# Patient Record
Sex: MALE | Race: AMERICAN INDIAN OR ALASKA NATIVE | NOT HISPANIC OR LATINO | Employment: OTHER | URBAN - METROPOLITAN AREA
[De-identification: names, ages, dates, MRNs, and addresses within clinical notes are randomized per-mention and may not be internally consistent; named-entity substitution may affect disease eponyms.]

---

## 2018-06-13 ENCOUNTER — HOSPITAL ENCOUNTER (OUTPATIENT)
Facility: MEDICAL CENTER | Age: 68
DRG: 330 | End: 2018-06-13
Payer: MEDICARE

## 2018-06-14 ENCOUNTER — HOSPITAL ENCOUNTER (INPATIENT)
Facility: MEDICAL CENTER | Age: 68
LOS: 7 days | DRG: 330 | End: 2018-06-21
Attending: HOSPITALIST | Admitting: HOSPITALIST
Payer: MEDICARE

## 2018-06-14 DIAGNOSIS — K63.2 ABDOMINAL WALL FISTULA: ICD-10-CM

## 2018-06-14 DIAGNOSIS — K57.92 DIVERTICULITIS: ICD-10-CM

## 2018-06-14 DIAGNOSIS — E66.9 OBESITY (BMI 35.0-39.9 WITHOUT COMORBIDITY): ICD-10-CM

## 2018-06-14 DIAGNOSIS — K65.1 ABSCESS OF ABDOMINAL CAVITY (HCC): ICD-10-CM

## 2018-06-14 PROBLEM — C64.9 RENAL CELL ADENOCARCINOMA (HCC): Status: ACTIVE | Noted: 2018-06-14

## 2018-06-14 PROBLEM — I10 HTN (HYPERTENSION): Status: ACTIVE | Noted: 2018-06-14

## 2018-06-14 PROBLEM — Z90.5 H/O UNILATERAL NEPHRECTOMY: Status: ACTIVE | Noted: 2018-06-14

## 2018-06-14 LAB
ALBUMIN SERPL BCP-MCNC: 3.5 G/DL (ref 3.2–4.9)
ALBUMIN/GLOB SERPL: 0.7 G/DL
ALP SERPL-CCNC: 58 U/L (ref 30–99)
ALT SERPL-CCNC: 13 U/L (ref 2–50)
ANION GAP SERPL CALC-SCNC: 8 MMOL/L (ref 0–11.9)
APTT PPP: 30.3 SEC (ref 24.7–36)
AST SERPL-CCNC: 20 U/L (ref 12–45)
BASOPHILS # BLD AUTO: 0.3 % (ref 0–1.8)
BASOPHILS # BLD: 0.02 K/UL (ref 0–0.12)
BILIRUB SERPL-MCNC: 1.7 MG/DL (ref 0.1–1.5)
BUN SERPL-MCNC: 19 MG/DL (ref 8–22)
CALCIUM SERPL-MCNC: 8.9 MG/DL (ref 8.5–10.5)
CHLORIDE SERPL-SCNC: 106 MMOL/L (ref 96–112)
CO2 SERPL-SCNC: 24 MMOL/L (ref 20–33)
CREAT SERPL-MCNC: 0.99 MG/DL (ref 0.5–1.4)
EOSINOPHIL # BLD AUTO: 0.15 K/UL (ref 0–0.51)
EOSINOPHIL NFR BLD: 2.3 % (ref 0–6.9)
ERYTHROCYTE [DISTWIDTH] IN BLOOD BY AUTOMATED COUNT: 44.6 FL (ref 35.9–50)
EST. AVERAGE GLUCOSE BLD GHB EST-MCNC: 131 MG/DL
GLOBULIN SER CALC-MCNC: 4.8 G/DL (ref 1.9–3.5)
GLUCOSE SERPL-MCNC: 106 MG/DL (ref 65–99)
HBA1C MFR BLD: 6.2 % (ref 0–5.6)
HCT VFR BLD AUTO: 38.3 % (ref 42–52)
HGB BLD-MCNC: 12.4 G/DL (ref 14–18)
IMM GRANULOCYTES # BLD AUTO: 0.02 K/UL (ref 0–0.11)
IMM GRANULOCYTES NFR BLD AUTO: 0.3 % (ref 0–0.9)
INR PPP: 1.12 (ref 0.87–1.13)
LYMPHOCYTES # BLD AUTO: 0.95 K/UL (ref 1–4.8)
LYMPHOCYTES NFR BLD: 14.7 % (ref 22–41)
MAGNESIUM SERPL-MCNC: 2.4 MG/DL (ref 1.5–2.5)
MCH RBC QN AUTO: 27.2 PG (ref 27–33)
MCHC RBC AUTO-ENTMCNC: 32.4 G/DL (ref 33.7–35.3)
MCV RBC AUTO: 84 FL (ref 81.4–97.8)
MONOCYTES # BLD AUTO: 0.49 K/UL (ref 0–0.85)
MONOCYTES NFR BLD AUTO: 7.6 % (ref 0–13.4)
NEUTROPHILS # BLD AUTO: 4.82 K/UL (ref 1.82–7.42)
NEUTROPHILS NFR BLD: 74.8 % (ref 44–72)
NRBC # BLD AUTO: 0 K/UL
NRBC BLD-RTO: 0 /100 WBC
PHOSPHATE SERPL-MCNC: 3.9 MG/DL (ref 2.5–4.5)
PLATELET # BLD AUTO: 286 K/UL (ref 164–446)
PMV BLD AUTO: 8.8 FL (ref 9–12.9)
POTASSIUM SERPL-SCNC: 4.1 MMOL/L (ref 3.6–5.5)
PROT SERPL-MCNC: 8.3 G/DL (ref 6–8.2)
PROTHROMBIN TIME: 14.1 SEC (ref 12–14.6)
RBC # BLD AUTO: 4.56 M/UL (ref 4.7–6.1)
SODIUM SERPL-SCNC: 138 MMOL/L (ref 135–145)
WBC # BLD AUTO: 6.5 K/UL (ref 4.8–10.8)

## 2018-06-14 PROCEDURE — 83735 ASSAY OF MAGNESIUM: CPT

## 2018-06-14 PROCEDURE — 83036 HEMOGLOBIN GLYCOSYLATED A1C: CPT

## 2018-06-14 PROCEDURE — 700105 HCHG RX REV CODE 258

## 2018-06-14 PROCEDURE — 700111 HCHG RX REV CODE 636 W/ 250 OVERRIDE (IP): Performed by: HOSPITALIST

## 2018-06-14 PROCEDURE — 770006 HCHG ROOM/CARE - MED/SURG/GYN SEMI*

## 2018-06-14 PROCEDURE — 700105 HCHG RX REV CODE 258: Performed by: HOSPITALIST

## 2018-06-14 PROCEDURE — 700102 HCHG RX REV CODE 250 W/ 637 OVERRIDE(OP): Performed by: SURGERY

## 2018-06-14 PROCEDURE — A9270 NON-COVERED ITEM OR SERVICE: HCPCS | Performed by: HOSPITALIST

## 2018-06-14 PROCEDURE — 700101 HCHG RX REV CODE 250: Performed by: SURGERY

## 2018-06-14 PROCEDURE — 99223 1ST HOSP IP/OBS HIGH 75: CPT | Mod: AI | Performed by: HOSPITALIST

## 2018-06-14 PROCEDURE — A9270 NON-COVERED ITEM OR SERVICE: HCPCS | Performed by: SURGERY

## 2018-06-14 PROCEDURE — 36415 COLL VENOUS BLD VENIPUNCTURE: CPT

## 2018-06-14 PROCEDURE — 84100 ASSAY OF PHOSPHORUS: CPT

## 2018-06-14 PROCEDURE — 85730 THROMBOPLASTIN TIME PARTIAL: CPT

## 2018-06-14 PROCEDURE — 85610 PROTHROMBIN TIME: CPT

## 2018-06-14 PROCEDURE — 99233 SBSQ HOSP IP/OBS HIGH 50: CPT | Performed by: HOSPITALIST

## 2018-06-14 PROCEDURE — 80053 COMPREHEN METABOLIC PANEL: CPT

## 2018-06-14 PROCEDURE — 85025 COMPLETE CBC W/AUTO DIFF WBC: CPT

## 2018-06-14 PROCEDURE — 700102 HCHG RX REV CODE 250 W/ 637 OVERRIDE(OP): Performed by: HOSPITALIST

## 2018-06-14 RX ORDER — AMOXICILLIN 250 MG
2 CAPSULE ORAL 2 TIMES DAILY
Status: DISCONTINUED | OUTPATIENT
Start: 2018-06-14 | End: 2018-06-21 | Stop reason: HOSPADM

## 2018-06-14 RX ORDER — ONDANSETRON 2 MG/ML
4 INJECTION INTRAMUSCULAR; INTRAVENOUS EVERY 4 HOURS PRN
Status: DISCONTINUED | OUTPATIENT
Start: 2018-06-14 | End: 2018-06-15

## 2018-06-14 RX ORDER — SODIUM CHLORIDE 9 MG/ML
INJECTION, SOLUTION INTRAVENOUS CONTINUOUS
Status: DISCONTINUED | OUTPATIENT
Start: 2018-06-14 | End: 2018-06-15

## 2018-06-14 RX ORDER — BISACODYL 10 MG
10 SUPPOSITORY, RECTAL RECTAL
Status: DISCONTINUED | OUTPATIENT
Start: 2018-06-14 | End: 2018-06-21 | Stop reason: HOSPADM

## 2018-06-14 RX ORDER — ACETAMINOPHEN 325 MG/1
650 TABLET ORAL EVERY 6 HOURS PRN
Status: DISCONTINUED | OUTPATIENT
Start: 2018-06-14 | End: 2018-06-21 | Stop reason: HOSPADM

## 2018-06-14 RX ORDER — MORPHINE SULFATE 10 MG/ML
5 INJECTION, SOLUTION INTRAMUSCULAR; INTRAVENOUS
Status: DISCONTINUED | OUTPATIENT
Start: 2018-06-14 | End: 2018-06-19

## 2018-06-14 RX ORDER — ONDANSETRON 4 MG/1
4 TABLET, ORALLY DISINTEGRATING ORAL EVERY 4 HOURS PRN
Status: DISCONTINUED | OUTPATIENT
Start: 2018-06-14 | End: 2018-06-15

## 2018-06-14 RX ORDER — SODIUM CHLORIDE 9 MG/ML
INJECTION, SOLUTION INTRAVENOUS
Status: COMPLETED
Start: 2018-06-14 | End: 2018-06-14

## 2018-06-14 RX ORDER — MORPHINE SULFATE 4 MG/ML
1-4 INJECTION, SOLUTION INTRAMUSCULAR; INTRAVENOUS
Status: DISCONTINUED | OUTPATIENT
Start: 2018-06-14 | End: 2018-06-19

## 2018-06-14 RX ORDER — MORPHINE SULFATE 4 MG/ML
1-5 INJECTION, SOLUTION INTRAMUSCULAR; INTRAVENOUS
Status: DISCONTINUED | OUTPATIENT
Start: 2018-06-14 | End: 2018-06-14

## 2018-06-14 RX ORDER — POLYETHYLENE GLYCOL 3350 17 G/17G
1 POWDER, FOR SOLUTION ORAL
Status: DISCONTINUED | OUTPATIENT
Start: 2018-06-14 | End: 2018-06-21 | Stop reason: HOSPADM

## 2018-06-14 RX ORDER — HALOPERIDOL 5 MG/ML
5 INJECTION INTRAMUSCULAR EVERY 4 HOURS PRN
Status: DISCONTINUED | OUTPATIENT
Start: 2018-06-14 | End: 2018-06-21 | Stop reason: HOSPADM

## 2018-06-14 RX ORDER — METRONIDAZOLE 500 MG/1
500 TABLET ORAL 3 TIMES DAILY
Status: COMPLETED | OUTPATIENT
Start: 2018-06-14 | End: 2018-06-15

## 2018-06-14 RX ORDER — NEOMYCIN SULFATE 500 MG/1
1000 TABLET ORAL 3 TIMES DAILY
Status: DISCONTINUED | OUTPATIENT
Start: 2018-06-14 | End: 2018-06-15

## 2018-06-14 RX ADMIN — PIPERACILLIN SODIUM AND TAZOBACTAM SODIUM 3.38 G: 3; .375 INJECTION, POWDER, FOR SOLUTION INTRAVENOUS at 21:07

## 2018-06-14 RX ADMIN — NEOMYCIN SULFATE 1000 MG: 500 TABLET ORAL at 16:21

## 2018-06-14 RX ADMIN — PIPERACILLIN SODIUM AND TAZOBACTAM SODIUM 3.38 G: 3; .375 INJECTION, POWDER, FOR SOLUTION INTRAVENOUS at 05:15

## 2018-06-14 RX ADMIN — PIPERACILLIN SODIUM AND TAZOBACTAM SODIUM 3.38 G: 3; .375 INJECTION, POWDER, FOR SOLUTION INTRAVENOUS at 12:56

## 2018-06-14 RX ADMIN — NEOMYCIN SULFATE 1000 MG: 500 TABLET ORAL at 22:11

## 2018-06-14 RX ADMIN — SODIUM CHLORIDE: 9 INJECTION, SOLUTION INTRAVENOUS at 09:18

## 2018-06-14 RX ADMIN — PIPERACILLIN SODIUM AND TAZOBACTAM SODIUM 3.38 G: 3; .375 INJECTION, POWDER, FOR SOLUTION INTRAVENOUS at 02:12

## 2018-06-14 RX ADMIN — SODIUM CHLORIDE: 9 INJECTION, SOLUTION INTRAVENOUS at 02:13

## 2018-06-14 RX ADMIN — METRONIDAZOLE 500 MG: 500 TABLET ORAL at 15:12

## 2018-06-14 RX ADMIN — NEOMYCIN SULFATE 1000 MG: 500 TABLET ORAL at 16:20

## 2018-06-14 RX ADMIN — POLYETHYLENE GLYCOL 3350, SODIUM SULFATE ANHYDROUS, SODIUM BICARBONATE, SODIUM CHLORIDE, POTASSIUM CHLORIDE 4 L: 236; 22.74; 6.74; 5.86; 2.97 POWDER, FOR SOLUTION ORAL at 15:37

## 2018-06-14 RX ADMIN — ACETAMINOPHEN 650 MG: 325 TABLET, FILM COATED ORAL at 15:42

## 2018-06-14 RX ADMIN — METRONIDAZOLE 500 MG: 500 TABLET ORAL at 22:11

## 2018-06-14 RX ADMIN — SODIUM CHLORIDE 1000 ML: 9 INJECTION, SOLUTION INTRAVENOUS at 02:11

## 2018-06-14 ASSESSMENT — ENCOUNTER SYMPTOMS
NECK PAIN: 0
GASTROINTESTINAL NEGATIVE: 1
RESPIRATORY NEGATIVE: 1
MUSCULOSKELETAL NEGATIVE: 1
TINGLING: 0
INSOMNIA: 0
ABDOMINAL PAIN: 0
BLURRED VISION: 0
EYES NEGATIVE: 1
VOMITING: 0
DIZZINESS: 0
EYE PAIN: 0
FEVER: 0
BACK PAIN: 0
SHORTNESS OF BREATH: 0
NEUROLOGICAL NEGATIVE: 1
PALPITATIONS: 0
CARDIOVASCULAR NEGATIVE: 1
CHILLS: 1
SORE THROAT: 0
CHILLS: 0
DEPRESSION: 0
NAUSEA: 0
COUGH: 0
PSYCHIATRIC NEGATIVE: 1
HEADACHES: 0

## 2018-06-14 ASSESSMENT — COPD QUESTIONNAIRES
IN THE PAST 12 MONTHS DO YOU DO LESS THAN YOU USED TO BECAUSE OF YOUR BREATHING PROBLEMS: DISAGREE/UNSURE
COPD SCREENING SCORE: 2
HAVE YOU SMOKED AT LEAST 100 CIGARETTES IN YOUR ENTIRE LIFE: NO/DON'T KNOW
COPD SCREENING SCORE: 2
DURING THE PAST 4 WEEKS HOW MUCH DID YOU FEEL SHORT OF BREATH: NONE/LITTLE OF THE TIME
HAVE YOU SMOKED AT LEAST 100 CIGARETTES IN YOUR ENTIRE LIFE: NO/DON'T KNOW
IN THE PAST 12 MONTHS DO YOU DO LESS THAN YOU USED TO BECAUSE OF YOUR BREATHING PROBLEMS: DISAGREE/UNSURE
DURING THE PAST 4 WEEKS HOW MUCH DID YOU FEEL SHORT OF BREATH: NONE/LITTLE OF THE TIME
DO YOU EVER COUGH UP ANY MUCUS OR PHLEGM?: NO/ONLY WITH OCCASIONAL COLDS OR INFECTIONS
DO YOU EVER COUGH UP ANY MUCUS OR PHLEGM?: NO/ONLY WITH OCCASIONAL COLDS OR INFECTIONS

## 2018-06-14 ASSESSMENT — LIFESTYLE VARIABLES
ALCOHOL_USE: NO
ALCOHOL_USE: NO
EVER_SMOKED: NEVER

## 2018-06-14 ASSESSMENT — PATIENT HEALTH QUESTIONNAIRE - PHQ9
SUM OF ALL RESPONSES TO PHQ9 QUESTIONS 1 AND 2: 0
1. LITTLE INTEREST OR PLEASURE IN DOING THINGS: NOT AT ALL
2. FEELING DOWN, DEPRESSED, IRRITABLE, OR HOPELESS: NOT AT ALL

## 2018-06-14 ASSESSMENT — PAIN SCALES - GENERAL
PAINLEVEL_OUTOF10: 2
PAINLEVEL_OUTOF10: 2

## 2018-06-14 NOTE — ASSESSMENT & PLAN NOTE
Recurrent.  On antibiotic therapy for the same.  Here with abscess and fistula formation  Iv abx, s/p surgery intervention.  Will need wound care post discharge-ostomy care and monitoring-setting up through [facility]

## 2018-06-14 NOTE — H&P
Hospital Medicine History and Physical    Date of Service  6/14/2018    Chief Complaint  Abdominal drainage    History of Presenting Illness  67 y.o. male with history of diverticulitis with abscess formation and chronic healing process, prior renal cell carcinoma with nephrectomy and stable was in his usual state of health until the day prior to admission.  He had felt a pop in his abdomen at the prior surgical site, and then began to notice drainage from the same, which appeared to be pus, and had a foul smell.  He had no pain, nausea or vomiting.  He had no other symptoms except possible chills.  Due to the drainage he felt he needed further medical attention and went to the hospital.  He was transferred to this facility for higher level of care and need surgical intervention.    Primary Care Physician  No primary care provider on file.    Consultants  General Surgery Dr. Teran     Code Status  Full code     Review of Systems  Review of Systems   Constitutional: Positive for chills.   HENT: Negative.    Eyes: Negative.    Respiratory: Negative.    Cardiovascular: Negative.    Gastrointestinal: Negative.    Genitourinary: Negative.    Musculoskeletal: Negative.    Skin: Negative.    Neurological: Negative.    Endo/Heme/Allergies: Negative.    Psychiatric/Behavioral: Negative.         Past Medical History  Past Medical History:   Diagnosis Date   • Cancer (HCC)    • Fall    • Hypertension    • Renal disorder      rt kidney removed dec 31, 2001       Surgical History  Past Surgical History:   Procedure Laterality Date   • OTHER ABDOMINAL SURGERY      bowel resection august 2016       Medications  No current facility-administered medications on file prior to encounter.      No current outpatient prescriptions on file prior to encounter.       Family History  Family History   Problem Relation Age of Onset   • No Known Problems Mother    • Alcohol/Drug Father        Social History  Social History   Substance Use  Topics   • Smoking status: Never Smoker   • Smokeless tobacco: Never Used   • Alcohol use No       Allergies  No Known Allergies     Physical Exam  Laboratory   Hemodynamics  Temp (24hrs), Av.2 °C (98.9 °F), Min:37.2 °C (98.9 °F), Max:37.2 °C (98.9 °F)   Temperature: 37.2 °C (98.9 °F)  Pulse  Av  Min: 86  Max: 86    Blood Pressure : 131/89      Respiratory      Respiration: 18, Pulse Oximetry: 93 %             Physical Exam   Constitutional: He is oriented to person, place, and time. He appears well-developed and well-nourished. No distress.   HENT:   Head: Normocephalic.   Eyes: Pupils are equal, round, and reactive to light.   Neck: Normal range of motion. Neck supple.   Cardiovascular: Normal rate, regular rhythm and normal heart sounds.  Exam reveals no gallop and no friction rub.    No murmur heard.  Pulmonary/Chest: Effort normal and breath sounds normal. No respiratory distress. He has no wheezes.   Abdominal: Soft. Bowel sounds are normal. He exhibits no distension and no mass. There is no tenderness. There is no rebound and no guarding.   Left lower quadrant surgical changes, drainage noted with bandage in place    Musculoskeletal: Normal range of motion. He exhibits no edema.   Neurological: He is alert and oriented to person, place, and time. No cranial nerve deficit.   Skin: He is not diaphoretic.   Nursing note and vitals reviewed.        From outside facility:    WBC 6.1  H/H 13.6/40.3      Glu 101  BUn 16  Crea 1.22  Na 138  K 4.0  Cl 105  CO2 24  Ca 9.4  T Bili 1.2  Lipase 224  AST 21  ALT 20            Imaging  CT abdomen and pelvis with contrast from outside facility with with persistent wall thickening with diverticula along the sigmoid colon adjacent extesnive soft tissue thickening, stranding and a few bubbles of gas extending along the thickening into abdominal wall musculature on the left side is also some soft tissue thickening along subcutaneous tissues extending out to the  skin.  Prior nephrectomy    Assessment/Plan     I anticipate this patient will require at least two midnights for appropriate medical management, necessitating inpatient admission.    * Abdominal wall fistula   Assessment & Plan    Transferred from outside facility for the same.  Plan for operative intervention - Dr. Teran.  Continue antibiotics with zosyn for now.  NPO        Diverticulitis   Assessment & Plan    Recurrent.  On antibiotic therapy for the same.  Now with abscess and fistula formation        Obesity (BMI 35.0-39.9 without comorbidity)   Assessment & Plan    Body mass index is 38.19 kg/m².              VTE prophylaxis: SCD hold lovenox for surgery.

## 2018-06-14 NOTE — PROGRESS NOTES
Pt A&O x's 4, VSS, denies any pain at this time. Pt has +void, +loose BM, +flatus. LLQ wound with moderate output, frequent dressing changes performed, wound care consulted, refer to notes.IVF's and abx running at bedside. POC discussed with pt, all questions and concerns have been addressed at this time. Bed in locked/lowest position, call light, urinal and personal items within reach. Will continue to monitor.

## 2018-06-14 NOTE — CARE PLAN
Problem: Safety  Goal: Will remain free from falls  Outcome: PROGRESSING AS EXPECTED  Pt remains free from fall at this time. Pt educated on fall risk, demonstrates understanding of appropriate use of call light, hourly rounding in place.

## 2018-06-14 NOTE — WOUND TEAM
Wound consult for evaluation of possible draining fistula LLQ.  Note a red linear wound that is draining apparent stool that is in a deep crease.  Due to location unable to apply a pouch to contain drainage.  Cleansed anna wound skin and applied skin prep and replaced dry gauze and ABD secured with brief.  Dr. Willams consulting and probable surgery.  Discussed with staff RN.

## 2018-06-14 NOTE — PROGRESS NOTES
Direct admit from [facility], Dr. Sarah, [phone].  Accepted by Dr. Steele for Diverticulitis complicated with fistula.  ADT signed & held @ [phone], needs to be released upon pt arrival.  No written orders received.  Pt coming by ground EMS.

## 2018-06-14 NOTE — PROGRESS NOTES
[facility]    Date of Service: 2018    Chief Complaint  67 y.o. male admitted 2018 with abdominal wound drainage and a significant history of diverticulitis and abscess in past.     Interval Problem Update  Stable since admission.     Consultants/Specialty  Dr dorado- surgery    Disposition  Npo for possible surgery    CT abdomen and pelvis with contrast from outside facility with with persistent wall thickening with diverticula along the sigmoid colon adjacent extesnive soft tissue thickening, stranding and a few bubbles of gas extending along the thickening into abdominal wall musculature on the left side is also some soft tissue thickening along subcutaneous tissues extending out to the skin.  Prior nephrectomy         Review of Systems   Constitutional: Negative for chills and fever.   HENT: Negative for sore throat.    Eyes: Negative for blurred vision and pain.   Respiratory: Negative for cough and shortness of breath.    Cardiovascular: Negative for chest pain and palpitations.   Gastrointestinal: Negative for abdominal pain, nausea and vomiting.   Genitourinary: Negative for dysuria and urgency.   Musculoskeletal: Negative for back pain and neck pain.   Skin: Negative for itching and rash.   Neurological: Negative for dizziness, tingling and headaches.   Psychiatric/Behavioral: Negative for depression. The patient does not have insomnia.    All other systems reviewed and are negative.     Physical Exam  Laboratory/Imaging   Hemodynamics  Temp (24hrs), Av.8 °C (98.2 °F), Min:36.3 °C (97.4 °F), Max:37.2 °C (98.9 °F)   Temperature: 36.3 °C (97.4 °F)  Pulse  Av  Min: 78  Max: 86    Blood Pressure : 128/85      Respiratory      Respiration: 18, Pulse Oximetry: 94 %             Fluids    Intake/Output Summary (Last 24 hours) at 18 0817  Last data filed at 18 0540   Gross per 24 hour   Intake              275 ml   Output              300 ml   Net              -25  ml       Nutrition  Orders Placed This Encounter   Procedures   • Diet NPO     Standing Status:   Standing     Number of Occurrences:   1     Order Specific Question:   Restrict to:     Answer:   Strict [1]     Physical Exam   Constitutional: He is oriented to person, place, and time. He appears well-developed and well-nourished. No distress.   Patient seen and examined  Plan discussed with RN   FERNANDOT:   Right Ear: External ear normal.   Left Ear: External ear normal.   Nose: Nose normal.   Eyes: Right eye exhibits no discharge. Left eye exhibits no discharge. No scleral icterus.   Neck: No JVD present. No tracheal deviation present.   Cardiovascular: Normal rate, normal heart sounds and intact distal pulses.    No murmur heard.  Cap refill 2sec  Pulses 2+ throughout     Pulmonary/Chest: Effort normal and breath sounds normal. No respiratory distress. He has no wheezes. He has no rales.   Abdominal: Soft. Bowel sounds are normal. He exhibits no distension. There is no tenderness. There is no guarding.   Llq wound with what appear to be stool leaking from it.    Musculoskeletal: He exhibits no edema or tenderness.   Neurological: He is alert and oriented to person, place, and time.   Skin: Skin is warm and dry. He is not diaphoretic. No erythema.   Normal skin color   Psychiatric: He has a normal mood and affect. His behavior is normal.   Nursing note and vitals reviewed.                               Assessment/Plan     * Abdominal wall fistula   Assessment & Plan    Transferred from outside facility for the same.  Surgery consulted for consideration of operative intervention.  Continue antibiotics.  NPO until surgical plan known.         Abscess of abdominal cavity (HCC)   Assessment & Plan    Possible. Iv abx and surgery consulted.         HTN (hypertension)   Assessment & Plan    Continue lisinopril        Renal cell adenocarcinoma (HCC)   Assessment & Plan    Hx in past wiht hx of nephrectomy        H/O  unilateral nephrectomy   Assessment & Plan    Watch drugs          Diverticulitis   Assessment & Plan    Recurrent.  On antibiotic therapy for the same.  Now with abscess and fistula formation  Iv abx, surgery consult.         Obesity (BMI 35.0-39.9 without comorbidity)   Assessment & Plan    Body mass index is 38.19 kg/m².            Quality-Core Measures   Reviewed items::  EKG reviewed, Radiology images reviewed, Labs reviewed and Medications reviewed  Dyer catheter::  No Dyer

## 2018-06-14 NOTE — PROGRESS NOTES
Pt (Mr Matos) admitted to room T 413-1  Via ambulance from [facility] at roughly 0030.  Consents signed  Pt A & O x 4 .  VSS  Pain reported at 2-10 on a scale of 0-10. Pt describes as tolerable.   Nausea denied   NPO at this time. Pt verbalizes understanding of NPO status  Wound to LLQ. Draining moderate amounts of purulent drainage. Gauze and abd pad placed over wound.  + Urine output  + BM PTA   + Flatus  Up standby w/ steady gait.  SCD's in place  Bed in lowest position and locked.  Bed alarm NA per Shaila Salcedo  Oriented to room call light and smoking policy.    Reviewed plan of care (equipment, incentive spirometer, sequential compression devices, medications, activity, diet, fall precautions, skin care, and pain) with patient.   Welcome packet given and reviewed with Pt, all questions answered.   Education provided on oral hygiene program.   Pt resting comfortably now.  Review plan of care with patient  Call light within reach  Hourly rounds in place  All needs met at this time

## 2018-06-14 NOTE — CARE PLAN
Problem: Safety  Goal: Will remain free from falls  Outcome: PROGRESSING AS EXPECTED  Pt remains free from fall at this time.  Pt educated on fall risk.  Pt demonstrates understanding by appropriate use of call light to call for assistance.  CLIP, hourly rounding in place.      Problem: Venous Thromboembolism (VTW)/Deep Vein Thrombosis (DVT) Prevention:  Goal: Patient will participate in Venous Thrombosis (VTE)/Deep Vein Thrombosis (DVT)Prevention Measures   06/14/18 0000   Mechanical/VTE Prophylaxis   Mechanical Prophylaxis  SCDs, Sequential Compression Device   SCDs, Sequential Compression Device On

## 2018-06-14 NOTE — PROGRESS NOTES
Medical records from [facility]:  ED report, Triage notes, Labs, Radiology report, and Demographics.  Scanned into Media tab.

## 2018-06-14 NOTE — PROGRESS NOTES
No home medications confirmed by interview with patient at bedside  No abx in past 30 days  NKDA confirmed

## 2018-06-14 NOTE — ASSESSMENT & PLAN NOTE
S/p diverting colostomy.    Pain controlled  Remains on IV antibiotics- discharge on by mouth or as recommended by surgery  Anticipate discharge tomorrow afternoon with follow-up in [facility] the subsequent day, [facility] also to follow

## 2018-06-15 LAB
ANION GAP SERPL CALC-SCNC: 9 MMOL/L (ref 0–11.9)
BASOPHILS # BLD AUTO: 0.5 % (ref 0–1.8)
BASOPHILS # BLD: 0.03 K/UL (ref 0–0.12)
BUN SERPL-MCNC: 12 MG/DL (ref 8–22)
CALCIUM SERPL-MCNC: 8.5 MG/DL (ref 8.5–10.5)
CHLORIDE SERPL-SCNC: 104 MMOL/L (ref 96–112)
CO2 SERPL-SCNC: 21 MMOL/L (ref 20–33)
CREAT SERPL-MCNC: 0.77 MG/DL (ref 0.5–1.4)
EOSINOPHIL # BLD AUTO: 0.27 K/UL (ref 0–0.51)
EOSINOPHIL NFR BLD: 4.4 % (ref 0–6.9)
ERYTHROCYTE [DISTWIDTH] IN BLOOD BY AUTOMATED COUNT: 43.8 FL (ref 35.9–50)
GLUCOSE SERPL-MCNC: 83 MG/DL (ref 65–99)
HCT VFR BLD AUTO: 37.4 % (ref 42–52)
HGB BLD-MCNC: 11.7 G/DL (ref 14–18)
IMM GRANULOCYTES # BLD AUTO: 0.01 K/UL (ref 0–0.11)
IMM GRANULOCYTES NFR BLD AUTO: 0.2 % (ref 0–0.9)
LYMPHOCYTES # BLD AUTO: 1.15 K/UL (ref 1–4.8)
LYMPHOCYTES NFR BLD: 18.9 % (ref 22–41)
MCH RBC QN AUTO: 26.3 PG (ref 27–33)
MCHC RBC AUTO-ENTMCNC: 31.3 G/DL (ref 33.7–35.3)
MCV RBC AUTO: 84 FL (ref 81.4–97.8)
MONOCYTES # BLD AUTO: 0.58 K/UL (ref 0–0.85)
MONOCYTES NFR BLD AUTO: 9.5 % (ref 0–13.4)
NEUTROPHILS # BLD AUTO: 4.04 K/UL (ref 1.82–7.42)
NEUTROPHILS NFR BLD: 66.5 % (ref 44–72)
NRBC # BLD AUTO: 0 K/UL
NRBC BLD-RTO: 0 /100 WBC
PLATELET # BLD AUTO: 280 K/UL (ref 164–446)
PMV BLD AUTO: 8.9 FL (ref 9–12.9)
POTASSIUM SERPL-SCNC: 3.8 MMOL/L (ref 3.6–5.5)
RBC # BLD AUTO: 4.45 M/UL (ref 4.7–6.1)
SODIUM SERPL-SCNC: 134 MMOL/L (ref 135–145)
WBC # BLD AUTO: 6.1 K/UL (ref 4.8–10.8)

## 2018-06-15 PROCEDURE — 700105 HCHG RX REV CODE 258: Performed by: HOSPITALIST

## 2018-06-15 PROCEDURE — 503366 HCHG TROCAR, 12X150 KII FIOS Z THR: Performed by: SURGERY

## 2018-06-15 PROCEDURE — 88307 TISSUE EXAM BY PATHOLOGIST: CPT

## 2018-06-15 PROCEDURE — 36415 COLL VENOUS BLD VENIPUNCTURE: CPT

## 2018-06-15 PROCEDURE — 0DBP0ZZ EXCISION OF RECTUM, OPEN APPROACH: ICD-10-PCS | Performed by: SURGERY

## 2018-06-15 PROCEDURE — 160031 HCHG SURGERY MINUTES - 1ST 30 MINS LEVEL 5: Performed by: SURGERY

## 2018-06-15 PROCEDURE — A9270 NON-COVERED ITEM OR SERVICE: HCPCS | Performed by: NURSE PRACTITIONER

## 2018-06-15 PROCEDURE — 700111 HCHG RX REV CODE 636 W/ 250 OVERRIDE (IP)

## 2018-06-15 PROCEDURE — 0D1L0Z4 BYPASS TRANSVERSE COLON TO CUTANEOUS, OPEN APPROACH: ICD-10-PCS | Performed by: SURGERY

## 2018-06-15 PROCEDURE — A6403 STERILE GAUZE>16 <= 48 SQ IN: HCPCS | Performed by: SURGERY

## 2018-06-15 PROCEDURE — 502714 HCHG ROBOTIC SURGERY SERVICES: Performed by: SURGERY

## 2018-06-15 PROCEDURE — 8E0W4CZ ROBOTIC ASSISTED PROCEDURE OF TRUNK REGION, PERCUTANEOUS ENDOSCOPIC APPROACH: ICD-10-PCS | Performed by: SURGERY

## 2018-06-15 PROCEDURE — 700102 HCHG RX REV CODE 250 W/ 637 OVERRIDE(OP): Performed by: HOSPITALIST

## 2018-06-15 PROCEDURE — A9270 NON-COVERED ITEM OR SERVICE: HCPCS | Performed by: HOSPITALIST

## 2018-06-15 PROCEDURE — 700101 HCHG RX REV CODE 250: Performed by: NURSE PRACTITIONER

## 2018-06-15 PROCEDURE — 700102 HCHG RX REV CODE 250 W/ 637 OVERRIDE(OP): Performed by: SURGERY

## 2018-06-15 PROCEDURE — 501571 HCHG TROCAR, SEPARATOR 12X100: Performed by: SURGERY

## 2018-06-15 PROCEDURE — 99232 SBSQ HOSP IP/OBS MODERATE 35: CPT | Performed by: HOSPITALIST

## 2018-06-15 PROCEDURE — 160002 HCHG RECOVERY MINUTES (STAT): Performed by: SURGERY

## 2018-06-15 PROCEDURE — 700111 HCHG RX REV CODE 636 W/ 250 OVERRIDE (IP): Performed by: HOSPITALIST

## 2018-06-15 PROCEDURE — 160009 HCHG ANES TIME/MIN: Performed by: SURGERY

## 2018-06-15 PROCEDURE — 0WBFXZZ EXCISION OF ABDOMINAL WALL, EXTERNAL APPROACH: ICD-10-PCS | Performed by: SURGERY

## 2018-06-15 PROCEDURE — 502240 HCHG MISC OR SUPPLY RC 0272: Performed by: SURGERY

## 2018-06-15 PROCEDURE — 700101 HCHG RX REV CODE 250

## 2018-06-15 PROCEDURE — 160048 HCHG OR STATISTICAL LEVEL 1-5: Performed by: SURGERY

## 2018-06-15 PROCEDURE — 160035 HCHG PACU - 1ST 60 MINS PHASE I: Performed by: SURGERY

## 2018-06-15 PROCEDURE — 85025 COMPLETE CBC W/AUTO DIFF WBC: CPT

## 2018-06-15 PROCEDURE — 80048 BASIC METABOLIC PNL TOTAL CA: CPT

## 2018-06-15 PROCEDURE — 501583 HCHG TROCAR, THRD CAN&SEAL 5X100: Performed by: SURGERY

## 2018-06-15 PROCEDURE — A6407 PACKING STRIPS, NON-IMPREG: HCPCS | Performed by: SURGERY

## 2018-06-15 PROCEDURE — 160042 HCHG SURGERY MINUTES - EA ADDL 1 MIN LEVEL 5: Performed by: SURGERY

## 2018-06-15 PROCEDURE — A9270 NON-COVERED ITEM OR SERVICE: HCPCS | Performed by: SURGERY

## 2018-06-15 PROCEDURE — 160036 HCHG PACU - EA ADDL 30 MINS PHASE I: Performed by: SURGERY

## 2018-06-15 PROCEDURE — 501572 HCHG TROCAR, SHIELD OBTU 5X100: Performed by: SURGERY

## 2018-06-15 PROCEDURE — 700102 HCHG RX REV CODE 250 W/ 637 OVERRIDE(OP): Performed by: NURSE PRACTITIONER

## 2018-06-15 PROCEDURE — 82962 GLUCOSE BLOOD TEST: CPT | Mod: 91

## 2018-06-15 PROCEDURE — 770006 HCHG ROOM/CARE - MED/SURG/GYN SEMI*

## 2018-06-15 PROCEDURE — 0DTN0ZZ RESECTION OF SIGMOID COLON, OPEN APPROACH: ICD-10-PCS | Performed by: SURGERY

## 2018-06-15 PROCEDURE — 501838 HCHG SUTURE GENERAL: Performed by: SURGERY

## 2018-06-15 PROCEDURE — 500002 HCHG ADHESIVE, DERMABOND: Performed by: SURGERY

## 2018-06-15 RX ORDER — DIPHENHYDRAMINE HYDROCHLORIDE 50 MG/ML
25 INJECTION INTRAMUSCULAR; INTRAVENOUS EVERY 6 HOURS PRN
Status: DISCONTINUED | OUTPATIENT
Start: 2018-06-15 | End: 2018-06-21 | Stop reason: HOSPADM

## 2018-06-15 RX ORDER — INSULIN GLARGINE 100 [IU]/ML
0.2 INJECTION, SOLUTION SUBCUTANEOUS EVERY EVENING
Status: DISCONTINUED | OUTPATIENT
Start: 2018-06-15 | End: 2018-06-16

## 2018-06-15 RX ORDER — OXYCODONE HYDROCHLORIDE 5 MG/1
5 TABLET ORAL
Status: DISCONTINUED | OUTPATIENT
Start: 2018-06-15 | End: 2018-06-21 | Stop reason: HOSPADM

## 2018-06-15 RX ORDER — CALCIUM CARBONATE 500 MG/1
500 TABLET, CHEWABLE ORAL
Status: DISCONTINUED | OUTPATIENT
Start: 2018-06-15 | End: 2018-06-21 | Stop reason: HOSPADM

## 2018-06-15 RX ORDER — SODIUM CHLORIDE, SODIUM LACTATE, POTASSIUM CHLORIDE, CALCIUM CHLORIDE 600; 310; 30; 20 MG/100ML; MG/100ML; MG/100ML; MG/100ML
INJECTION, SOLUTION INTRAVENOUS
Status: COMPLETED | OUTPATIENT
Start: 2018-06-15 | End: 2018-06-15

## 2018-06-15 RX ORDER — ONDANSETRON 2 MG/ML
4 INJECTION INTRAMUSCULAR; INTRAVENOUS EVERY 4 HOURS PRN
Status: DISCONTINUED | OUTPATIENT
Start: 2018-06-15 | End: 2018-06-21 | Stop reason: HOSPADM

## 2018-06-15 RX ORDER — HEPARIN SODIUM 5000 [USP'U]/ML
5000 INJECTION, SOLUTION INTRAVENOUS; SUBCUTANEOUS EVERY 8 HOURS
Status: DISCONTINUED | OUTPATIENT
Start: 2018-06-16 | End: 2018-06-21 | Stop reason: HOSPADM

## 2018-06-15 RX ORDER — ACETAMINOPHEN 500 MG
1000 TABLET ORAL EVERY 6 HOURS
Status: DISPENSED | OUTPATIENT
Start: 2018-06-15 | End: 2018-06-20

## 2018-06-15 RX ORDER — SCOLOPAMINE TRANSDERMAL SYSTEM 1 MG/1
1 PATCH, EXTENDED RELEASE TRANSDERMAL
Status: DISCONTINUED | OUTPATIENT
Start: 2018-06-15 | End: 2018-06-21 | Stop reason: HOSPADM

## 2018-06-15 RX ORDER — DEXTROSE MONOHYDRATE 25 G/50ML
25 INJECTION, SOLUTION INTRAVENOUS
Status: DISCONTINUED | OUTPATIENT
Start: 2018-06-15 | End: 2018-06-16

## 2018-06-15 RX ORDER — MAGNESIUM HYDROXIDE 1200 MG/15ML
LIQUID ORAL
Status: COMPLETED | OUTPATIENT
Start: 2018-06-15 | End: 2018-06-15

## 2018-06-15 RX ORDER — DEXAMETHASONE SODIUM PHOSPHATE 4 MG/ML
4 INJECTION, SOLUTION INTRA-ARTICULAR; INTRALESIONAL; INTRAMUSCULAR; INTRAVENOUS; SOFT TISSUE
Status: DISCONTINUED | OUTPATIENT
Start: 2018-06-15 | End: 2018-06-21 | Stop reason: HOSPADM

## 2018-06-15 RX ORDER — DIPHENHYDRAMINE HCL 25 MG
25 TABLET ORAL EVERY 6 HOURS PRN
Status: DISCONTINUED | OUTPATIENT
Start: 2018-06-15 | End: 2018-06-21 | Stop reason: HOSPADM

## 2018-06-15 RX ORDER — HYDRALAZINE HYDROCHLORIDE 20 MG/ML
INJECTION INTRAMUSCULAR; INTRAVENOUS
Status: COMPLETED
Start: 2018-06-15 | End: 2018-06-15

## 2018-06-15 RX ORDER — HALOPERIDOL 5 MG/ML
1 INJECTION INTRAMUSCULAR EVERY 6 HOURS PRN
Status: DISCONTINUED | OUTPATIENT
Start: 2018-06-15 | End: 2018-06-21 | Stop reason: HOSPADM

## 2018-06-15 RX ORDER — IBUPROFEN 800 MG/1
800 TABLET ORAL
Status: DISCONTINUED | OUTPATIENT
Start: 2018-06-15 | End: 2018-06-19

## 2018-06-15 RX ORDER — DEXTROSE MONOHYDRATE, SODIUM CHLORIDE, AND POTASSIUM CHLORIDE 50; 1.49; 4.5 G/1000ML; G/1000ML; G/1000ML
INJECTION, SOLUTION INTRAVENOUS CONTINUOUS
Status: DISCONTINUED | OUTPATIENT
Start: 2018-06-15 | End: 2018-06-16

## 2018-06-15 RX ORDER — HYDRALAZINE HYDROCHLORIDE 20 MG/ML
10 INJECTION INTRAMUSCULAR; INTRAVENOUS
Status: DISCONTINUED | OUTPATIENT
Start: 2018-06-15 | End: 2018-06-16

## 2018-06-15 RX ADMIN — PIPERACILLIN SODIUM AND TAZOBACTAM SODIUM 3.38 G: 3; .375 INJECTION, POWDER, FOR SOLUTION INTRAVENOUS at 05:46

## 2018-06-15 RX ADMIN — HYDROMORPHONE HYDROCHLORIDE 0.5 MG: 10 INJECTION, SOLUTION INTRAMUSCULAR; INTRAVENOUS; SUBCUTANEOUS at 20:15

## 2018-06-15 RX ADMIN — FENTANYL CITRATE 50 MCG: 50 INJECTION, SOLUTION INTRAMUSCULAR; INTRAVENOUS at 19:36

## 2018-06-15 RX ADMIN — METRONIDAZOLE 500 MG: 500 TABLET ORAL at 05:46

## 2018-06-15 RX ADMIN — POTASSIUM CHLORIDE, DEXTROSE MONOHYDRATE AND SODIUM CHLORIDE: 150; 5; 450 INJECTION, SOLUTION INTRAVENOUS at 21:13

## 2018-06-15 RX ADMIN — OXYCODONE HYDROCHLORIDE 5 MG: 10 TABLET ORAL at 23:35

## 2018-06-15 RX ADMIN — HYDRALAZINE HYDROCHLORIDE 10 MG: 20 INJECTION INTRAMUSCULAR; INTRAVENOUS at 19:32

## 2018-06-15 RX ADMIN — PIPERACILLIN SODIUM AND TAZOBACTAM SODIUM 3.38 G: 3; .375 INJECTION, POWDER, FOR SOLUTION INTRAVENOUS at 23:29

## 2018-06-15 RX ADMIN — PIPERACILLIN SODIUM AND TAZOBACTAM SODIUM 3.38 G: 3; .375 INJECTION, POWDER, FOR SOLUTION INTRAVENOUS at 13:00

## 2018-06-15 RX ADMIN — ACETAMINOPHEN 1000 MG: 500 TABLET ORAL at 23:35

## 2018-06-15 RX ADMIN — SODIUM CHLORIDE: 9 INJECTION, SOLUTION INTRAVENOUS at 06:36

## 2018-06-15 RX ADMIN — DOCUSATE SODIUM AND SENNOSIDES 2 TABLET: 8.6; 5 TABLET, FILM COATED ORAL at 21:13

## 2018-06-15 RX ADMIN — IBUPROFEN 800 MG: 800 TABLET, FILM COATED ORAL at 21:13

## 2018-06-15 RX ADMIN — HYDROMORPHONE HYDROCHLORIDE 0.5 MG: 10 INJECTION, SOLUTION INTRAMUSCULAR; INTRAVENOUS; SUBCUTANEOUS at 20:02

## 2018-06-15 ASSESSMENT — COPD QUESTIONNAIRES
HAVE YOU SMOKED AT LEAST 100 CIGARETTES IN YOUR ENTIRE LIFE: YES
DURING THE PAST 4 WEEKS HOW MUCH DID YOU FEEL SHORT OF BREATH: SOME OF THE TIME
DO YOU EVER COUGH UP ANY MUCUS OR PHLEGM?: NO/ONLY WITH OCCASIONAL COLDS OR INFECTIONS
COPD SCREENING SCORE: 5

## 2018-06-15 ASSESSMENT — PAIN SCALES - GENERAL
PAINLEVEL_OUTOF10: 4
PAINLEVEL_OUTOF10: 5
PAINLEVEL_OUTOF10: 0
PAINLEVEL_OUTOF10: 6
PAINLEVEL_OUTOF10: 5
PAINLEVEL_OUTOF10: 0
PAINLEVEL_OUTOF10: 4
PAINLEVEL_OUTOF10: 0
PAINLEVEL_OUTOF10: 4
PAINLEVEL_OUTOF10: 4
PAINLEVEL_OUTOF10: 8

## 2018-06-15 ASSESSMENT — ENCOUNTER SYMPTOMS
TINGLING: 0
DEPRESSION: 0
EYE PAIN: 0
BLURRED VISION: 0
ABDOMINAL PAIN: 0
NAUSEA: 0
INSOMNIA: 0
NECK PAIN: 0
CHILLS: 0
BACK PAIN: 0
SORE THROAT: 0
FEVER: 0
PALPITATIONS: 0
DIZZINESS: 0
COUGH: 0
SHORTNESS OF BREATH: 0

## 2018-06-15 ASSESSMENT — LIFESTYLE VARIABLES: EVER_SMOKED: YES

## 2018-06-15 NOTE — CARE PLAN
Problem: Infection  Goal: Will remain free from infection  Iv abx continued, labs and VS being monitored      Problem: Bowel/Gastric:  Goal: Normal bowel function is maintained or improved  Bowel prep complete, clear output, NPO since midnight

## 2018-06-15 NOTE — WOUND TEAM
"Ostomy Pre-Operative Marking and Teaching       Date of Surgery:  6/15/18  Type of Surgery:  LAR with possible colostomy  Surgeon:  Dr. Willams    Subjective:  \"I hope I don't get this.  I knew someone who had one and they \"    Objective: Assessed patient to identify potential stoma site within his/her line of site on a flat pouching surface, within the rectus muscle, away from belt-line, bony prominences, exiting scars and umbilicus.    Assessment:  Potential site (s) located for: Colostomy__X_, Ileostomy____  Urostomy Other_________  1. Procedure explained to the patient.  2. Rectus muscle identified with patient in supine position.  3. Appropriate abdominal quadrant for planned surgical procedure identified.  4. Existing scars identified.  5. Potential sites evaluated with patient:      a. Supine      b. Sitting       c. Bending forward/ twisting at waist   6. Site (s) selected with respect to skin folds, creases, abdominal contours and belt line.  7. Special considerations:  NONE      a. Wheel chair bound      b. Child      c. Continent procedure      d. Patient with multiple stomas      e. Ambulatory  8. Potential site (s) marked with an \"X\"; clarita applied with indelible marker  PATIENT WITH THICK ABDOMINAL WALL AND 'JELLY BELLY'' HAS TRANSVERSE CREASE UPPER ABDOMEN WHEN SITTING AND DEEP BELLY BUTTON SO MARKED STOMA HIGH AND AWAY FROM THESE AREAS                       Site(s) marked:  L mid to upper quadrant    Patient Teaching:  Limited as patient overwhelmed by events in the past 3 days.  Also fistula output increased due to GoLytely.  Basic information given and reassured patient that we would teach him ostomy care if he ended up with ostomy.  Placed a Coloplast two piece appliance with paste ring over fistula with intial seal.  Instructed staff RN in appliance and how to empty and additional appliance left at bedside for night shift if needed.    Plan:  Ostomy team will follow up post op and start " education should needed

## 2018-06-15 NOTE — CONSULTS
Surgical History and Physical    Date: 6/15/2018    Requesting Physician: Dr Teran    Consulting Physician: Wesly Willams    Reason for consultation: Sigmoid colon     CC: Abdominal pain    HPI: This is a 67 y.o. male who is admitted from a referring institution for diverticulitis with a colocutaneous draining sinus.  Patient denies personal history of colon cancer.  Family history of colon cancer or inflammatory bowel disease is negative.  Patient's last colonoscopy was 2 years ago according to the patient that I do not have any records available to me at this time.  He has remote history of a right nephrectomy.    Past Medical History:   Diagnosis Date   • Cancer (HCC)    • HTN (hypertension) 6/14/2018   • Renal disorder      rt kidney removed dec 31, 2001       Past Surgical History:   Procedure Laterality Date   • OTHER ABDOMINAL SURGERY      bowel resection august 2016       Current Facility-Administered Medications   Medication Dose Route Frequency Provider Last Rate Last Dose   • NS infusion   Intravenous Continuous Elan Blunt M.D. 100 mL/hr at 06/15/18 0636     • ondansetron (ZOFRAN) syringe/vial injection 4 mg  4 mg Intravenous Q4HRS PRN Elan Blunt M.D.       • ondansetron (ZOFRAN ODT) dispertab 4 mg  4 mg Oral Q4HRS PRN Elan Blunt M.D.       • haloperidol lactate (HALDOL) injection 5 mg  5 mg Intravenous Q4HRS PRN Elan Blunt M.D.       • senna-docusate (PERICOLACE or SENOKOT S) 8.6-50 MG per tablet 2 Tab  2 Tab Oral BID Elan Blunt M.D.   Stopped at 06/14/18 0900    And   • polyethylene glycol/lytes (MIRALAX) PACKET 1 Packet  1 Packet Oral QDAY PRN Elan Blunt M.D.        And   • magnesium hydroxide (MILK OF MAGNESIA) suspension 30 mL  30 mL Oral QDAY PRN Elan Blunt M.D.        And   • bisacodyl (DULCOLAX) suppository 10 mg  10 mg Rectal QDAY PRN Elan Blunt M.D.       • acetaminophen (TYLENOL) tablet 650 mg  650 mg Oral Q6HRS PRN Elan Blunt M.D.   650 mg at  06/14/18 1542   • piperacillin-tazobactam (ZOSYN) 3.375 g in  mL IVPB  3.375 g Intravenous Q8HRS Elan Blunt M.D. 25 mL/hr at 06/15/18 1300 3.375 g at 06/15/18 1300   • morphine (pf) 4 mg/ml injection 1-4 mg  1-4 mg Intravenous Q3HRS PRN Norris Bernal M.D.        Or   • morphine (pf) 10 mg/ml 10 MG/ML injection 5 mg  5 mg Intravenous Q3HRS PRN Norris Bernal M.D.       • neomycin (MYCIFRADIN) tablet 1,000 mg  1,000 mg Oral TID Wesly Willams M.D.   1,000 mg at 06/14/18 2211       Social History     Social History   • Marital status: N/A     Spouse name: N/A   • Number of children: N/A   • Years of education: N/A     Occupational History   • Not on file.     Social History Main Topics   • Smoking status: Never Smoker   • Smokeless tobacco: Never Used   • Alcohol use No   • Drug use: No   • Sexual activity: Not on file     Other Topics Concern   • Not on file     Social History Narrative   • No narrative on file       Family History   Problem Relation Age of Onset   • No Known Problems Mother    • Alcohol/Drug Father        Allergies:  Patient has no known allergies.    Review of Systems:  Constitutional: Positive for fever, chills negative for weight loss  HENT: Negative for nosebleeds   Eyes: Negative for changes in vision or photophobia  Respiratory: Negative for cough, shortness of breath or wheezing  Cardiovascular: Negative for chest pain or palpitations  Gastrointestinal: Negative for nausea, vomiting, diarrhea, blood in stool and melena.   Genitourinary: Negative for dysuria or urinary incontinence   Musculoskeletal: Negative for back pain and joint pain.   Skin: Negative for itching and rash.  Neurological: Negative for dizziness, lightheadedness or loss of consciousness  Endo/Heme/Allergies: Does not bruise/bleed easily.   Psychiatric/Behavioral: Negative for substance abuse. The patient is not nervous/anxious and does not have insomnia.    Physical Exam:  Blood pressure 139/95, pulse 78,  "temperature 36.6 °C (97.8 °F), resp. rate 18, height 1.753 m (5' 9\"), weight 117.3 kg (258 lb 9.6 oz), SpO2 94 %.    Constitutional: oriented to person, place, and time.  appears well-developed and well-nourished. No distress.   Head: Normocephalic and atraumatic.   Neck: Normal range of motion. Neck supple. No JVD present. No tracheal deviation present. No thyromegaly present.   Cardiovascular: Normal rate, regular rhythm, normal heart sounds and intact distal pulses.    Pulmonary/Chest: Normal respiratory effort. No stridor. No respiratory distress.   Abdominal: His previous incision site is fluctuant and erythematous with stool output  Musculoskeletal: Normal range of motion.  exhibits no edema and no tenderness.   Neurological: Coordination normal. Without tremors.  Skin: Skin is warm and dry. No rash noted. he is not diaphoretic. No erythema. No pallor.   Psychiatric: normal mood and affect.  Behavior is normal.       Labs:  Recent Labs      06/14/18   1152  06/15/18   0226   WBC  6.5  6.1   RBC  4.56*  4.45*   HEMOGLOBIN  12.4*  11.7*   HEMATOCRIT  38.3*  37.4*   MCV  84.0  84.0   MCH  27.2  26.3*   MCHC  32.4*  31.3*   RDW  44.6  43.8   PLATELETCT  286  280   MPV  8.8*  8.9*     Recent Labs      06/14/18   1152  06/15/18   0226   SODIUM  138  134*   POTASSIUM  4.1  3.8   CHLORIDE  106  104   CO2  24  21   GLUCOSE  106*  83   BUN  19  12   CREATININE  0.99  0.77   CALCIUM  8.9  8.5     Recent Labs      06/14/18   1152   APTT  30.3   INR  1.12     Recent Labs      06/14/18   1152   ASTSGOT  20   ALTSGPT  13   TBILIRUBIN  1.7*   ALKPHOSPHAT  58   GLOBULIN  4.8*   INR  1.12         Radiology:  No orders to display       Assessment: This is a 67 y.o. possible colocutaneous fistula related to diverticular disease  Active Hospital Problems    Diagnosis   • Abdominal wall fistula [K63.2]   • Obesity (BMI 35.0-39.9 without comorbidity) [E66.9]   • Diverticulitis [K57.92]   • H/O unilateral nephrectomy [Z90.5]   • " Renal cell adenocarcinoma (HCC) [C64.9]   • HTN (hypertension) [I10]   • Abscess of abdominal cavity (HCC) [K65.1]       Plan: Given the above presentation, the patient will be taken to the operating room for laparoscopic versus open colon resection. The surgical plan was discussed the the patient and available family. Potential complications were discussed in detail and include but are not limited to infection, bleeding, damage to adjacent tissues and organs, anesthetic complications . Additional possible complications specifically related to the planned procedure included in the discussion were related to leak,failure to localize lesion, bowel, bladder, or vascular trocar injury, abscess, fistula, hernia or need for permanent or temporary ostomy.    Questions were elicited and answered to the patient's and available family's satisfaction. The patient understands the rationale for surgery and agrees to proceed.  Operative consent was obtained.  According to the [employer] NSQIP surgical risk report the patient is above average in terms of risk for any or serious complications including respiratory, cardiac, death, return to OR for discharge to a nursing or rehabilitation facility.    Wesly Willams MD PhD  [facility]  Colon and Rectal Surgeon  [phone]

## 2018-06-15 NOTE — PROGRESS NOTES
Pt A&O x's 4, VSS, denies any pain at this time. Pt has +void, +clear loose BM, +flatus. LLQ wound with moderate output,new ostomy replaced d/t leakage. IVF's and abx running at bedside. Surgery scheduled for 1300, consent signed, CHG bath complete. POC discussed with pt, all questions and concerns have been addressed at this time, pt would like more information from the doctor prior to procedure. Bed in locked/lowest position, call light, urinal and personal items within reach. Will continue to monitor and advance to surgery.

## 2018-06-15 NOTE — PROGRESS NOTES
Assumed care at 1845. Pt resting in bed. A&ox 4  Ambulating with standby assist  LLQ fistula with ostomy bag in place. +high output   Pt currently drinking golytely prep. Up to BR multiple times  Denies pain  Tolerating clears for now. NPO at midnight  O2 on RA. Voiding   Call light within reach. Hourly rounding in place

## 2018-06-15 NOTE — DIETARY
"Nutrition services: Day 1 of admit.  Carlo Lew is a 67 y.o. male with admitting DX of diverticulitis with complicated fistula.  Consult received for supplement (Ensure) use and recent unplanned wt loss per nutrition admit screen.    Assessment:  Height: 175.3 cm (5' 9\")  Weight: 117.3 kg (258 lb 9.6 oz)  Body mass index is 38.19 kg/m². (obese, class II)  Diet/Intake: NPO for surgery today    Evaluation:   1. Attempted to visit pt today for nutrition assessment, however pt was off floor for surgery  2. No previous encounters in chart review to obtain wt hx  3. Pt with hx of diverticulitis with abscess formation and chronic healing process per H&P  4. Also hx of prior renal cell carcinoma with nephrectomy  5. Skin: open surgical wound documented in flowsheet    Recommendations/Plan:  1. Advance diet past NPO/clears as medically feasible  2. Will consider adding supplements prn if pt has poor PO intake  3. Encourage intake of meals  4. Document intake of all meals as % taken in ADL's to provide interdisciplinary communication across all shifts.   5. Monitor weight.  6. Nutrition rep will continue to see patient for ongoing meal and snack preferences.   7. RD to monitor for diet advancement, PO intake, wt trends, and nutrition labs/meds          "

## 2018-06-15 NOTE — PROGRESS NOTES
[facility] Hospitalist Progress Note    Date of Service: 6/15/2018    Chief Complaint  67 y.o. male admitted 2018 with abdominal wound drainage and a significant history of diverticulitis and abscess in past.     Interval Problem Update  Stable since admission.   Surgery planned for this PM.     Consultants/Specialty  surgery    Disposition  Npo for surgery    CT abdomen and pelvis with contrast from outside facility with with persistent wall thickening with diverticula along the sigmoid colon adjacent extesnive soft tissue thickening, stranding and a few bubbles of gas extending along the thickening into abdominal wall musculature on the left side is also some soft tissue thickening along subcutaneous tissues extending out to the skin.  Prior nephrectomy         Review of Systems   Constitutional: Negative for chills and fever.   HENT: Negative for sore throat.    Eyes: Negative for blurred vision and pain.   Respiratory: Negative for cough and shortness of breath.    Cardiovascular: Negative for chest pain and palpitations.   Gastrointestinal: Negative for abdominal pain and nausea.   Genitourinary: Negative for dysuria and urgency.   Musculoskeletal: Negative for back pain and neck pain.   Skin: Negative for itching and rash.   Neurological: Negative for dizziness and tingling.   Psychiatric/Behavioral: Negative for depression. The patient does not have insomnia.    All other systems reviewed and are negative.     Physical Exam  Laboratory/Imaging   Hemodynamics  Temp (24hrs), Av.1 °C (98.8 °F), Min:36.1 °C (97 °F), Max:38.1 °C (100.6 °F)   Temperature: 37.1 °C (98.8 °F)  Pulse  Av.8  Min: 74  Max: 86    Blood Pressure : 140/90      Respiratory      Respiration: 18, Pulse Oximetry: 95 %             Fluids    Intake/Output Summary (Last 24 hours) at 06/15/18 0817  Last data filed at 06/15/18 0500   Gross per 24 hour   Intake             3060 ml   Output             1475 ml   Net             1585 ml        Nutrition  Orders Placed This Encounter   Procedures   • Diet NPO     Standing Status:   Standing     Number of Occurrences:   1     Order Specific Question:   Restrict to:     Answer:   Strict [1]     Physical Exam   Constitutional: He is oriented to person, place, and time. He appears well-developed and well-nourished. No distress.   Patient seen and examined  Plan discussed with RN   FERNANDOT:   Right Ear: External ear normal.   Left Ear: External ear normal.   Mouth/Throat: Oropharynx is clear and moist.   Eyes: Right eye exhibits no discharge. Left eye exhibits no discharge. No scleral icterus.   Neck: No JVD present. No tracheal deviation present.   Cardiovascular: Normal rate, normal heart sounds and intact distal pulses.    No murmur heard.  Cap refill 2sec  Pulses 2+ throughout     Pulmonary/Chest: Effort normal and breath sounds normal. No respiratory distress. He has no rales.   Abdominal: Soft. Bowel sounds are normal. He exhibits no distension. There is no tenderness.   Llq wound with what appear to be stool leaking from it.    Musculoskeletal: He exhibits no edema or tenderness.   Neurological: He is alert and oriented to person, place, and time.   Skin: Skin is warm and dry. He is not diaphoretic. No erythema.   Normal skin color   Psychiatric: He has a normal mood and affect. His behavior is normal.   Nursing note and vitals reviewed.      Recent Labs      06/14/18   1152  06/15/18   0226   WBC  6.5  6.1   RBC  4.56*  4.45*   HEMOGLOBIN  12.4*  11.7*   HEMATOCRIT  38.3*  37.4*   MCV  84.0  84.0   MCH  27.2  26.3*   MCHC  32.4*  31.3*   RDW  44.6  43.8   PLATELETCT  286  280   MPV  8.8*  8.9*     Recent Labs      06/14/18   1152  06/15/18   0226   SODIUM  138  134*   POTASSIUM  4.1  3.8   CHLORIDE  106  104   CO2  24  21   GLUCOSE  106*  83   BUN  19  12   CREATININE  0.99  0.77   CALCIUM  8.9  8.5     Recent Labs      06/14/18 1152   APTT  30.3   INR  1.12                  Assessment/Plan     *  Abdominal wall fistula   Assessment & Plan    Transferred from outside facility for the same.  Surgery planning operative intervention for today.   Continue iv antibiotics. Pain control postop  Trend lytes        Abscess of abdominal cavity (HCC)   Assessment & Plan    Possible. Iv abx and surgery consulted.         HTN (hypertension)   Assessment & Plan    Continue lisinopril        Renal cell adenocarcinoma (HCC)   Assessment & Plan    Hx in past with hx of nephrectomy        H/O unilateral nephrectomy   Assessment & Plan    Watch for nephrotoxic drugs          Diverticulitis   Assessment & Plan    Recurrent.  On antibiotic therapy for the same.  Now with abscess and fistula formation  Iv abx, surgery intervention.         Obesity (BMI 35.0-39.9 without comorbidity)   Assessment & Plan    Body mass index is 38.19 kg/m².            Quality-Core Measures   Reviewed items::  EKG reviewed, Radiology images reviewed, Labs reviewed and Medications reviewed  Dyer catheter::  No Dyer

## 2018-06-15 NOTE — CARE PLAN
Problem: Safety  Goal: Will remain free from injury  Updated about POC. Reinforce call light use. Pt acknowledged understanding    Problem: Infection  Goal: Will remain free from infection  Iv abx continued.     Problem: Bowel/Gastric:  Goal: Normal bowel function is maintained or improved  golytely prep. NPO at midnight

## 2018-06-16 PROBLEM — R73.9 HYPERGLYCEMIA: Status: ACTIVE | Noted: 2018-06-16

## 2018-06-16 PROBLEM — E87.1 HYPONATREMIA: Status: ACTIVE | Noted: 2018-06-16

## 2018-06-16 LAB
ALBUMIN SERPL BCP-MCNC: 3.5 G/DL (ref 3.2–4.9)
ALBUMIN/GLOB SERPL: 0.7 G/DL
ALP SERPL-CCNC: 54 U/L (ref 30–99)
ALT SERPL-CCNC: 13 U/L (ref 2–50)
ANION GAP SERPL CALC-SCNC: 11 MMOL/L (ref 0–11.9)
AST SERPL-CCNC: 23 U/L (ref 12–45)
BASOPHILS # BLD AUTO: 0.3 % (ref 0–1.8)
BASOPHILS # BLD: 0.04 K/UL (ref 0–0.12)
BILIRUB SERPL-MCNC: 1.3 MG/DL (ref 0.1–1.5)
BUN SERPL-MCNC: 10 MG/DL (ref 8–22)
CALCIUM SERPL-MCNC: 8.6 MG/DL (ref 8.5–10.5)
CHLORIDE SERPL-SCNC: 99 MMOL/L (ref 96–112)
CO2 SERPL-SCNC: 21 MMOL/L (ref 20–33)
CREAT SERPL-MCNC: 0.75 MG/DL (ref 0.5–1.4)
EOSINOPHIL # BLD AUTO: 0 K/UL (ref 0–0.51)
EOSINOPHIL NFR BLD: 0 % (ref 0–6.9)
ERYTHROCYTE [DISTWIDTH] IN BLOOD BY AUTOMATED COUNT: 43.2 FL (ref 35.9–50)
GLOBULIN SER CALC-MCNC: 5 G/DL (ref 1.9–3.5)
GLUCOSE BLD-MCNC: 108 MG/DL (ref 65–99)
GLUCOSE BLD-MCNC: 130 MG/DL (ref 65–99)
GLUCOSE BLD-MCNC: 136 MG/DL (ref 65–99)
GLUCOSE BLD-MCNC: 138 MG/DL (ref 65–99)
GLUCOSE BLD-MCNC: 145 MG/DL (ref 65–99)
GLUCOSE BLD-MCNC: 183 MG/DL (ref 65–99)
GLUCOSE SERPL-MCNC: 164 MG/DL (ref 65–99)
HCT VFR BLD AUTO: 37.9 % (ref 42–52)
HGB BLD-MCNC: 12.2 G/DL (ref 14–18)
IMM GRANULOCYTES # BLD AUTO: 0.04 K/UL (ref 0–0.11)
IMM GRANULOCYTES NFR BLD AUTO: 0.3 % (ref 0–0.9)
LYMPHOCYTES # BLD AUTO: 0.45 K/UL (ref 1–4.8)
LYMPHOCYTES NFR BLD: 3.5 % (ref 22–41)
MCH RBC QN AUTO: 26.8 PG (ref 27–33)
MCHC RBC AUTO-ENTMCNC: 32.2 G/DL (ref 33.7–35.3)
MCV RBC AUTO: 83.1 FL (ref 81.4–97.8)
MONOCYTES # BLD AUTO: 0.45 K/UL (ref 0–0.85)
MONOCYTES NFR BLD AUTO: 3.5 % (ref 0–13.4)
NEUTROPHILS # BLD AUTO: 11.73 K/UL (ref 1.82–7.42)
NEUTROPHILS NFR BLD: 92.4 % (ref 44–72)
NRBC # BLD AUTO: 0 K/UL
NRBC BLD-RTO: 0 /100 WBC
PLATELET # BLD AUTO: 286 K/UL (ref 164–446)
PMV BLD AUTO: 9 FL (ref 9–12.9)
POTASSIUM SERPL-SCNC: 3.9 MMOL/L (ref 3.6–5.5)
PROT SERPL-MCNC: 8.5 G/DL (ref 6–8.2)
RBC # BLD AUTO: 4.56 M/UL (ref 4.7–6.1)
SODIUM SERPL-SCNC: 131 MMOL/L (ref 135–145)
WBC # BLD AUTO: 12.7 K/UL (ref 4.8–10.8)

## 2018-06-16 PROCEDURE — 99233 SBSQ HOSP IP/OBS HIGH 50: CPT | Performed by: HOSPITALIST

## 2018-06-16 PROCEDURE — 700111 HCHG RX REV CODE 636 W/ 250 OVERRIDE (IP): Performed by: HOSPITALIST

## 2018-06-16 PROCEDURE — 85025 COMPLETE CBC W/AUTO DIFF WBC: CPT

## 2018-06-16 PROCEDURE — 80053 COMPREHEN METABOLIC PANEL: CPT

## 2018-06-16 PROCEDURE — 700105 HCHG RX REV CODE 258: Performed by: HOSPITALIST

## 2018-06-16 PROCEDURE — 700111 HCHG RX REV CODE 636 W/ 250 OVERRIDE (IP): Performed by: NURSE PRACTITIONER

## 2018-06-16 PROCEDURE — 302098 PASTE RING (FLAT): Performed by: HOSPITALIST

## 2018-06-16 PROCEDURE — 36415 COLL VENOUS BLD VENIPUNCTURE: CPT

## 2018-06-16 PROCEDURE — 302099 OSTOMY PASTE: Performed by: HOSPITALIST

## 2018-06-16 PROCEDURE — 82962 GLUCOSE BLOOD TEST: CPT | Mod: 91

## 2018-06-16 PROCEDURE — 700102 HCHG RX REV CODE 250 W/ 637 OVERRIDE(OP): Performed by: NURSE PRACTITIONER

## 2018-06-16 PROCEDURE — 770006 HCHG ROOM/CARE - MED/SURG/GYN SEMI*

## 2018-06-16 PROCEDURE — A9270 NON-COVERED ITEM OR SERVICE: HCPCS | Performed by: NURSE PRACTITIONER

## 2018-06-16 RX ORDER — INSULIN GLARGINE 100 [IU]/ML
0.2 INJECTION, SOLUTION SUBCUTANEOUS EVERY EVENING
Status: DISCONTINUED | OUTPATIENT
Start: 2018-06-16 | End: 2018-06-21 | Stop reason: HOSPADM

## 2018-06-16 RX ORDER — SODIUM CHLORIDE 9 MG/ML
INJECTION, SOLUTION INTRAVENOUS CONTINUOUS
Status: DISCONTINUED | OUTPATIENT
Start: 2018-06-16 | End: 2018-06-18

## 2018-06-16 RX ORDER — DEXTROSE MONOHYDRATE 25 G/50ML
25 INJECTION, SOLUTION INTRAVENOUS
Status: DISCONTINUED | OUTPATIENT
Start: 2018-06-16 | End: 2018-06-21 | Stop reason: HOSPADM

## 2018-06-16 RX ORDER — HYDRALAZINE HYDROCHLORIDE 20 MG/ML
10 INJECTION INTRAMUSCULAR; INTRAVENOUS EVERY 6 HOURS PRN
Status: DISCONTINUED | OUTPATIENT
Start: 2018-06-16 | End: 2018-06-21 | Stop reason: HOSPADM

## 2018-06-16 RX ADMIN — IBUPROFEN 800 MG: 800 TABLET, FILM COATED ORAL at 08:06

## 2018-06-16 RX ADMIN — ACETAMINOPHEN 1000 MG: 500 TABLET ORAL at 17:38

## 2018-06-16 RX ADMIN — INSULIN LISPRO 2 UNITS: 100 INJECTION, SOLUTION INTRAVENOUS; SUBCUTANEOUS at 06:05

## 2018-06-16 RX ADMIN — HEPARIN SODIUM 5000 UNITS: 5000 INJECTION, SOLUTION INTRAVENOUS; SUBCUTANEOUS at 17:38

## 2018-06-16 RX ADMIN — IBUPROFEN 800 MG: 800 TABLET, FILM COATED ORAL at 11:45

## 2018-06-16 RX ADMIN — PIPERACILLIN SODIUM AND TAZOBACTAM SODIUM 3.38 G: 3; .375 INJECTION, POWDER, FOR SOLUTION INTRAVENOUS at 05:57

## 2018-06-16 RX ADMIN — SODIUM CHLORIDE: 9 INJECTION, SOLUTION INTRAVENOUS at 21:14

## 2018-06-16 RX ADMIN — ACETAMINOPHEN 1000 MG: 500 TABLET ORAL at 05:52

## 2018-06-16 RX ADMIN — INSULIN GLARGINE 23 UNITS: 100 INJECTION, SOLUTION SUBCUTANEOUS at 21:28

## 2018-06-16 RX ADMIN — ACETAMINOPHEN 1000 MG: 500 TABLET ORAL at 11:46

## 2018-06-16 RX ADMIN — PIPERACILLIN SODIUM AND TAZOBACTAM SODIUM 3.38 G: 3; .375 INJECTION, POWDER, FOR SOLUTION INTRAVENOUS at 13:02

## 2018-06-16 RX ADMIN — OXYCODONE HYDROCHLORIDE 5 MG: 10 TABLET ORAL at 02:40

## 2018-06-16 RX ADMIN — SODIUM CHLORIDE: 9 INJECTION, SOLUTION INTRAVENOUS at 11:46

## 2018-06-16 RX ADMIN — PIPERACILLIN SODIUM AND TAZOBACTAM SODIUM 3.38 G: 3; .375 INJECTION, POWDER, FOR SOLUTION INTRAVENOUS at 21:10

## 2018-06-16 RX ADMIN — IBUPROFEN 800 MG: 800 TABLET, FILM COATED ORAL at 17:38

## 2018-06-16 ASSESSMENT — ENCOUNTER SYMPTOMS
DEPRESSION: 0
ABDOMINAL PAIN: 0
TINGLING: 0
FEVER: 0
NAUSEA: 0
SORE THROAT: 0
EYE PAIN: 0
COUGH: 0
BACK PAIN: 0
PALPITATIONS: 0
NECK PAIN: 0
INSOMNIA: 0
SHORTNESS OF BREATH: 0
DIZZINESS: 0
CHILLS: 0
BLURRED VISION: 0

## 2018-06-16 ASSESSMENT — PAIN SCALES - GENERAL
PAINLEVEL_OUTOF10: 4
PAINLEVEL_OUTOF10: 0
PAINLEVEL_OUTOF10: 0

## 2018-06-16 NOTE — PROGRESS NOTES
Pt A&O x's 4, VSS, denies any pain at this time. Pt has +void, +loose BM rectally, +bloody output from ostomy, +flatus. LLQ wound with DIP, CDI, ostomy leaking around site, will reinforce or change if needed. IVF's and abx running at bedside. POC discussed with pt, all questions and concerns have been addressed at this time, bed in locked/lowest position, call light, urinal and personal items within reach. Will continue to monitor.

## 2018-06-16 NOTE — OR NURSING
"Pt sleepy with complaints of having to \"pee\"; pt has a catheter. Report given to Leopoldo JONES.  "

## 2018-06-16 NOTE — OR NURSING
Pt received from Nadine Alamo. Pt in bed and resting, c/o needing to urinate. Oriented to brown.cyu to sbd.      Pt repositioned in bed. Hob^ pt refused to stand or drink. Will monitor pt for short while longer if pt continues to decline orders with contact physician.    Vs with ^ bp and bradicardic.

## 2018-06-16 NOTE — OP REPORT
Operative Report    Date: 6/15/2018    Surgeon: Wesly Willams M.D.    Assistant: Christie Bell      Pre-operative Diagnosis:   Morbid obesity with BMI greater than 35  Hypertension  Diverticulitis with colocutaneous fistula    Post-operative Diagnosis: same    Procedure:   1)  ROBOTIC ASSISTED SIGMOID COLON RESECTION with creation of end colostomy    Indications: 67-year-old male with presumed diverticular disease has colocutaneous fistula and desires definitive surgical diagnosis and treatment.    An extensive PARQ conference was held with the patient and his family, in regard to treatment options for complicated diverticular disease. The patient was made aware of the alternatives, including operative and non-operative: Expectant management. The risks of bleeding, infection, damage to surrounding structures, need for reoperation, stomal complications such as parastomal hernia, prolapse or ostomy dysfunction, fistula, hernia, leak, stroke, MI, and death were discussed with the patient. The patient was given a chance to ask questions, and all his questions were answered. The patient demonstrates adequate understanding, seems pleased with the plan, and wishes to proceed.    OPERATIVE FINDINGS: Sigmoid colon was densely adhered to the anterior abdominal wall the site of the colocutaneous fistula. This was all taken down in the sigmoid colon removed and a end colostomy created.    Procedure in detail: After informed consent was obtained, the patient was   taken to the operating room, placed in supine position on a pink pad. The patient underwent general endotracheal anesthesia without incident.  The patient's arms were tucked and the chest and shoulders were padded and secured to the operating room table.   The patient was then moved to lithotomy in yellowfin stirrups with all skin and joint surfaces padded and protected appropriately.The rectum was washed out with Betadine and the abdomen was prepped and draped  in a sterile fashion. A timeout was performed verifying the correct patient, procedure, site, positioning in availability of equipment prior to the start of surgery.    Operation was begun by placing a 5 mm periumbilical incision through which a 5 mm trocar was introduced into the abdomen using the Optiview technique. After pneumoperitoneum was achieved, additional trocars were placed, 15 mm in the right lower quadrant and 8 mm in the left upper quadrant. An 8 and a 5 mm assistant port were placed and the camera port was upsized to an 8 mm trocar as well. All trocars were placed with 0.5% Marcaine without epinephrine for local anesthesia.    The patient was positioned in steep Trendelenburg and right lateral decubitus and before the da Wang robotic system was docked the patient was noted to be securely  positioned on the table.  We took down the left lateral attachments of the sigmoid colon, identifying the left ureter which was preserved throughout the remainder of the procedure.  We then opened the retroperitoneal space in the right side and dissected in   the bloodless plane, isolated the INOCENCIO pedicle away from the pelvic nerves and   ureters. The INOCENCIO was then divided near its origin with a robotic stapler with a vascular load. We then used electrocautery to mobilize the left colon up to the splenic flexure,  taking down omental attachments and adhesions and producing adequate length   to reach the anterior abdominal wall for a tension-free colostomy.    Electrocautery was now used in the presacral space in the bloodless plane.   Dissection was carried down and left and right laterally, freeing up the   rectum. We chose a spot of healthy proximal rectum, divided the mesorectum with a   vessel sealer at this level and then divided the rectum itself with a green   load robotic staple fire.    We made an extraction incision in the left lower quadrant at the premarked colostomy site, carried down with    electrocautery to the level of the fascia. The anterior fascia was opened,   the muscles retracted medially and the posterior fascia opened as well. Wound  retractor was secured into position and the specimen delivered out onto the   operative field. The bowel was brought through this site and divided proximal to the diverticular disease. The fascia was closed with #1 PDS so that the defect fit the bowel without gaps. The abdomen was reinsufflated  We observed hemostasis throughout the operative field, closed the 12 mm trocar site with an Endoclose device. Pneumoperitoneum was reduced and all trocars were removed.    At this point numbers of the operating team changed into clean gown and gloves and all of the instruments used in the previous portions of the procedure were moved away from the operating field.  Instruments which had not been previously detached during  The case were now used  For the remainder of the procedure. The colostomy was matured in the manner of Valerie with interrupted 3-0 Vicryl sutures. Skin incision was closed with 4-0 Monocryl. The colocutaneous fistula site was debrided and packed with iodoform. Dermabond was placed and the patient returned to the PACU in stable condition. All   instruments counts were correct at the end of the procedureThe patient was awakened from general anesthetic, and was taken to the recovery room in stable condition.    Specimen: Sigmoid colon and rectum    EBL: 150mL    UOP: See anesthetic record    Drains: None    Dispo: PACU    Wesly Willams MD PhD  [facility]  Colon and Rectal Surgeon  [phone]

## 2018-06-16 NOTE — CARE PLAN
Problem: Infection  Goal: Will remain free from infection  Iv abx continued, labs and VS being monitored      Problem: Bowel/Gastric:  Goal: Normal bowel function is maintained or improved  Pt having +ostomy and rectal output, stool softeners held per active BM's

## 2018-06-16 NOTE — OR NURSING
Pt denies that he had pre op instructions that he would be required to stand and move while in pacu or drink. Pt continues to drink or stand.     Pt states that he needs to lye down. Will continue to monitor.    ^ diastolic bp. Cuff repositioned. Continued ^ bp.    Pt states he does not use cpap at night.

## 2018-06-16 NOTE — OR NURSING
Dr. Arrington called notified of ^ bp order for 10 mg Hydralizine iv now received. Pt medicated as ordered.

## 2018-06-16 NOTE — OR NURSING
Pt not willing to get up. Able to drink apple juice.     Dr. Willams called and informed that pt unable to stand or ambulate. Dr. Arrington also aware.    No additional orders for standing at this time.    Pt with c/o back pain and was medicated with rx'd meds.

## 2018-06-16 NOTE — PROGRESS NOTES
[facility] Hospitalist Progress Note    Date of Service: 2018    Chief Complaint  67 y.o. male admitted 2018 with abdominal wound drainage and a significant history of diverticulitis and abscess in past. Now s/p partial sigmoid resection and colostomy.         Interval Problem Update  POD1 and doing well  hypertensive needing prn meds- prn hydralazine q6 started.   Sodium low- fluids changed to NS.   hyperglycemic- d5 stopped- has a1c of 6.2      Consultants/Specialty  surgery    Disposition  Suspect home soon without needs.   Ok to go when cleared by surgery.     CT abdomen and pelvis with contrast from outside facility with with persistent wall thickening with diverticula along the sigmoid colon adjacent extesnive soft tissue thickening, stranding and a few bubbles of gas extending along the thickening into abdominal wall musculature on the left side is also some soft tissue thickening along subcutaneous tissues extending out to the skin.  Prior nephrectomy         Review of Systems   Constitutional: Negative for chills and fever.   HENT: Negative for sore throat.    Eyes: Negative for blurred vision and pain.   Respiratory: Negative for cough and shortness of breath.    Cardiovascular: Negative for chest pain and palpitations.   Gastrointestinal: Negative for abdominal pain and nausea.   Genitourinary: Negative for dysuria and urgency.   Musculoskeletal: Negative for back pain and neck pain.   Skin: Negative for itching and rash.   Neurological: Negative for dizziness and tingling.   Psychiatric/Behavioral: Negative for depression. The patient does not have insomnia.    All other systems reviewed and are negative.     Physical Exam  Laboratory/Imaging   Hemodynamics  Temp (24hrs), Av.2 °C (97.2 °F), Min:36.1 °C (96.9 °F), Max:36.6 °C (97.8 °F)   Temperature: 36.3 °C (97.4 °F)  Pulse  Av.7  Min: 59  Max: 86 Heart Rate (Monitored): 75  Blood Pressure : 119/65, NIBP: 130/82      Respiratory       Respiration: 17, Pulse Oximetry: 92 %     Work Of Breathing / Effort: Mild  RUL Breath Sounds: Clear, RML Breath Sounds: Clear;Diminished, RLL Breath Sounds: Diminished, NII Breath Sounds: Clear, LLL Breath Sounds: Diminished    Fluids    Intake/Output Summary (Last 24 hours) at 06/16/18 1002  Last data filed at 06/16/18 0700   Gross per 24 hour   Intake             1125 ml   Output             1225 ml   Net             -100 ml       Nutrition  Orders Placed This Encounter   Procedures   • DIET ORDER     Standing Status:   Standing     Number of Occurrences:   1     Order Specific Question:   Diet:     Answer:   Regular [1]     Physical Exam   Constitutional: He is oriented to person, place, and time. He appears well-developed and well-nourished. No distress.   Patient seen and examined  Plan discussed with RN   FERNANDOT:   Right Ear: External ear normal.   Left Ear: External ear normal.   Mouth/Throat: Oropharynx is clear and moist.   Eyes: Right eye exhibits no discharge. Left eye exhibits no discharge. No scleral icterus.   Neck: No JVD present. No tracheal deviation present.   Cardiovascular: Normal rate, normal heart sounds and intact distal pulses.    No murmur heard.  Cap refill 2sec  Pulses 2+ throughout     Pulmonary/Chest: Effort normal and breath sounds normal. No respiratory distress. He has no rales.   Abdominal: Soft. Bowel sounds are normal. He exhibits no distension. There is no tenderness.   Llq wound with what appear to be stool leaking from it.    Musculoskeletal: He exhibits no edema or tenderness.   Neurological: He is alert and oriented to person, place, and time.   Skin: Skin is warm and dry. He is not diaphoretic. No erythema.   Normal skin color   Psychiatric: He has a normal mood and affect. His behavior is normal.   Nursing note and vitals reviewed.      Recent Labs      06/14/18   1152  06/15/18   0226  06/16/18   0326   WBC  6.5  6.1  12.7*   RBC  4.56*  4.45*  4.56*   HEMOGLOBIN  12.4*   11.7*  12.2*   HEMATOCRIT  38.3*  37.4*  37.9*   MCV  84.0  84.0  83.1   MCH  27.2  26.3*  26.8*   MCHC  32.4*  31.3*  32.2*   RDW  44.6  43.8  43.2   PLATELETCT  286  280  286   MPV  8.8*  8.9*  9.0     Recent Labs      06/14/18   1152  06/15/18   0226  06/16/18   0326   SODIUM  138  134*  131*   POTASSIUM  4.1  3.8  3.9   CHLORIDE  106  104  99   CO2  24  21  21   GLUCOSE  106*  83  164*   BUN  19  12  10   CREATININE  0.99  0.77  0.75   CALCIUM  8.9  8.5  8.6     Recent Labs      06/14/18   1152   APTT  30.3   INR  1.12                  Assessment/Plan     * Abdominal wall fistula   Assessment & Plan    S/p diverting colostomy.  Continue iv antibiotics. Pain control postop  Trend lytes  Likely canhave po abx soon        Hyperglycemia   Assessment & Plan    a1c 6.2  Consistent with glucose intolerance  Weight loss and metformin in future.         Hyponatremia   Assessment & Plan    Iv fluids and trend  Suspect hypovoilemic from fistula/infection        Abscess of abdominal cavity (HCC)   Assessment & Plan    Possible. Iv abx and surgery consulted.         HTN (hypertension)   Assessment & Plan    Continue lisinopril        Renal cell adenocarcinoma (HCC)   Assessment & Plan    Hx in past with hx of nephrectomy        H/O unilateral nephrectomy   Assessment & Plan    Watch for nephrotoxic drugs          Diverticulitis   Assessment & Plan    Recurrent.  On antibiotic therapy for the same.  Now with abscess and fistula formation  Iv abx, surgery intervention.         Obesity (BMI 35.0-39.9 without comorbidity)   Assessment & Plan    Body mass index is 38.19 kg/m².            Quality-Core Measures   Reviewed items::  EKG reviewed, Radiology images reviewed, Labs reviewed and Medications reviewed  Dyer catheter::  No Dyer

## 2018-06-16 NOTE — PROGRESS NOTES
"Surgical Progress Note:    POD #1 S/P Davinci LAR with end colostomy and takedown of enterocutaneous fistula.   Doing well. Neg N/V, Ostomy with + FLATUS/ no BM, Pain controlled, Denies chest pain or SOB.  Ambulating. Tolerating PO.    PE:  /65   Pulse 77   Temp 36.3 °C (97.4 °F)   Resp 17   Ht 1.753 m (5' 9\")   Wt 117.3 kg (258 lb 9.6 oz)   SpO2 92%   BMI 38.19 kg/m²     I/O:   Intake/Output Summary (Last 24 hours) at 06/16/18 1129  Last data filed at 06/16/18 0700   Gross per 24 hour   Intake             1125 ml   Output             1225 ml   Net             -100 ml     Review of Systems   Constitutional: Negative.  Negative for chills and fever.   Respiratory: Negative for shortness of breath.    Cardiovascular: Negative for chest pain.   Gastrointestinal: Negative for nausea and vomiting.        +flatus/no stool   Genitourinary: Negative.      Physical Exam   Constitutional:  appears well-developed.   Neck: Neck supple.   Cardiovascular: Normal rate.    Pulmonary/Chest: Effort normal.   Abdominal: Soft.  exhibits no distension. Appropriate tenderness.   Incisions clean, dry, and intact  Stoma pink and viable  Musculoskeletal: Normal range of motion.   Neurological:  alert.   Skin:  Warm and dry.   Extremities: polo, no edema    Labs:  Recent Labs      06/14/18   1152  06/15/18   0226  06/16/18   0326   WBC  6.5  6.1  12.7*   RBC  4.56*  4.45*  4.56*   HEMOGLOBIN  12.4*  11.7*  12.2*   HEMATOCRIT  38.3*  37.4*  37.9*   MCV  84.0  84.0  83.1   MCH  27.2  26.3*  26.8*   RDW  44.6  43.8  43.2   PLATELETCT  286  280  286   MPV  8.8*  8.9*  9.0   NEUTSPOLYS  74.80*  66.50  92.40*   LYMPHOCYTES  14.70*  18.90*  3.50*   MONOCYTES  7.60  9.50  3.50   EOSINOPHILS  2.30  4.40  0.00   BASOPHILS  0.30  0.50  0.30     Recent Labs      06/14/18   1152  06/15/18   0226  06/16/18   0326   SODIUM  138  134*  131*   POTASSIUM  4.1  3.8  3.9   CHLORIDE  106  104  99   CO2  24  21  21   GLUCOSE  106*  83  164*   BUN  19  " 12  10         A/P:   - Regular low residue diet as tolerated.  - IS Q One hour while awake  - Ambulate.  Out of bed for all meals please  - Pain controlled.  Cont PO pain medication  - Labs noted, recheck in am  - DVT propholaxis, ok to start heparin, sequentials and ambulate  - Adequate urine output.  Cont, to monitor  - cont Zosyn X 4 days then home on PO due to colocutaneous fistula from perf diverticulitis      Discussed with Patient, RN and Dr. Parul VELASCO  [facility]  [phone]

## 2018-06-16 NOTE — PROGRESS NOTES
A/Ox 4.  VSS.  Reports minimal abdominal pain 4 /10, medicated per MAR, ice applied, splinting education provided.    Abdominal lap sites, dermabonded, CDI.  LLQ ostomy, red stoma, small sanguinous output.  LLQ dressing in place, CDI.    + SINGH, denies numbness and tingling, generalized weakness.  Oxygen dc'd appropriately per order, refused CPAP, satting >90%, denies SOB or difficulty breathing, IS used appropriately.  SCDs in place.  + normoactive BSx4, denies flatus, no BM this shift, LBM 06/15, denies n/v.  Tolerated GI soft diet well, advanced diet to regular.  + void, clear, yellow QS.  Will dc brown at 0500.  Up with SBA, tolerates well.  Up to BR, standing at edge of bed, repositions self frequently.  IVF dc'd appropriately per order, PO intake encouraged.    Call light within reach, calls appropriately.  Bed in lowest locked position.

## 2018-06-16 NOTE — OR NURSING
TO OR WITH OSTOMY BAG SECURED TO LLQ, COVERING ENTERO-CUTANEOUS FISTULA.  DEVICE REMOVED AND WOUND CLOSED PRE-OPERATIVELY BY SCHUYLER RAMSAY RN.

## 2018-06-16 NOTE — WOUND TEAM
" [facility]  Inpatient Services  New Ostomy Management & Teaching    HPI:  Reviewed  PMH: Reviewed   SH: Reviewed    Subjective: The nurse changed it, and did a good job    Objective:     Ostomy type:  colostomy    Stoma location: LUQ   Stoma assessment:    Appearance: Red, oval    Size:  approx 2.25\"    Protrusion: Slightly budded    Output: none    MC jxn  NA    Peristomal skin: NA     Ostomy Appliance (type and size): 2 3/4 \" two piece with paste ring.    Interventions:removed pouch to assess stoma. Barrier intact. Provided patient with written teaching materials and had patient sign the [employer] card. Discussed how frequent to empty pouch, and change appliance     Pt education: Questions and concerns addressed    Evaluation: appliance placed by RN intact. Patient likely will need a convex ring, and potentially a one piece flexible appliance due appearing to be located where the abdomen will fold.     Plan: Ostomy RN to change with patient tomorrow to assess for appropriate appliance.     Anticipated discharge needs: Supplies, supplier information, possible HH or outpatient ostomy clinic     "

## 2018-06-16 NOTE — CARE PLAN
Problem: Safety  Goal: Will remain free from injury  Outcome: PROGRESSING AS EXPECTED  Pt calls appropriately.  Educated on fall risk prevention.    Problem: Bowel/Gastric:  Goal: Normal bowel function is maintained or improved  Outcome: PROGRESSING SLOWER THAN EXPECTED  Small loose BM from rectum.  Small red output from ostomy.  +normoactive BSx4, denies flatus, denies n/v.    Problem: Pain Management  Goal: Pain level will decrease to patient's comfort goal  Outcome: PROGRESSING AS EXPECTED  Pain well controlled on current regimen per pt.  Moderate abdominal pain 4/10.

## 2018-06-17 LAB
ALBUMIN SERPL BCP-MCNC: 3.3 G/DL (ref 3.2–4.9)
ALBUMIN/GLOB SERPL: 0.8 G/DL
ALP SERPL-CCNC: 43 U/L (ref 30–99)
ALT SERPL-CCNC: 11 U/L (ref 2–50)
ANION GAP SERPL CALC-SCNC: 8 MMOL/L (ref 0–11.9)
AST SERPL-CCNC: 19 U/L (ref 12–45)
BASOPHILS # BLD AUTO: 0.3 % (ref 0–1.8)
BASOPHILS # BLD: 0.02 K/UL (ref 0–0.12)
BILIRUB SERPL-MCNC: 0.8 MG/DL (ref 0.1–1.5)
BUN SERPL-MCNC: 9 MG/DL (ref 8–22)
CALCIUM SERPL-MCNC: 8.4 MG/DL (ref 8.5–10.5)
CHLORIDE SERPL-SCNC: 104 MMOL/L (ref 96–112)
CO2 SERPL-SCNC: 22 MMOL/L (ref 20–33)
CREAT SERPL-MCNC: 0.82 MG/DL (ref 0.5–1.4)
EOSINOPHIL # BLD AUTO: 0.14 K/UL (ref 0–0.51)
EOSINOPHIL NFR BLD: 1.9 % (ref 0–6.9)
ERYTHROCYTE [DISTWIDTH] IN BLOOD BY AUTOMATED COUNT: 43.9 FL (ref 35.9–50)
GLOBULIN SER CALC-MCNC: 4.2 G/DL (ref 1.9–3.5)
GLUCOSE BLD-MCNC: 101 MG/DL (ref 65–99)
GLUCOSE BLD-MCNC: 105 MG/DL (ref 65–99)
GLUCOSE BLD-MCNC: 92 MG/DL (ref 65–99)
GLUCOSE BLD-MCNC: 99 MG/DL (ref 65–99)
GLUCOSE SERPL-MCNC: 97 MG/DL (ref 65–99)
HCT VFR BLD AUTO: 34.3 % (ref 42–52)
HGB BLD-MCNC: 11.3 G/DL (ref 14–18)
IMM GRANULOCYTES # BLD AUTO: 0.02 K/UL (ref 0–0.11)
IMM GRANULOCYTES NFR BLD AUTO: 0.3 % (ref 0–0.9)
LYMPHOCYTES # BLD AUTO: 1.02 K/UL (ref 1–4.8)
LYMPHOCYTES NFR BLD: 14 % (ref 22–41)
MCH RBC QN AUTO: 27.4 PG (ref 27–33)
MCHC RBC AUTO-ENTMCNC: 32.9 G/DL (ref 33.7–35.3)
MCV RBC AUTO: 83.3 FL (ref 81.4–97.8)
MONOCYTES # BLD AUTO: 0.63 K/UL (ref 0–0.85)
MONOCYTES NFR BLD AUTO: 8.7 % (ref 0–13.4)
NEUTROPHILS # BLD AUTO: 5.44 K/UL (ref 1.82–7.42)
NEUTROPHILS NFR BLD: 74.8 % (ref 44–72)
NRBC # BLD AUTO: 0 K/UL
NRBC BLD-RTO: 0 /100 WBC
PLATELET # BLD AUTO: 277 K/UL (ref 164–446)
PMV BLD AUTO: 9.2 FL (ref 9–12.9)
POTASSIUM SERPL-SCNC: 3.7 MMOL/L (ref 3.6–5.5)
PROT SERPL-MCNC: 7.5 G/DL (ref 6–8.2)
RBC # BLD AUTO: 4.12 M/UL (ref 4.7–6.1)
SODIUM SERPL-SCNC: 134 MMOL/L (ref 135–145)
WBC # BLD AUTO: 7.3 K/UL (ref 4.8–10.8)

## 2018-06-17 PROCEDURE — 700102 HCHG RX REV CODE 250 W/ 637 OVERRIDE(OP): Performed by: NURSE PRACTITIONER

## 2018-06-17 PROCEDURE — 700111 HCHG RX REV CODE 636 W/ 250 OVERRIDE (IP): Performed by: HOSPITALIST

## 2018-06-17 PROCEDURE — A9270 NON-COVERED ITEM OR SERVICE: HCPCS | Performed by: NURSE PRACTITIONER

## 2018-06-17 PROCEDURE — 80053 COMPREHEN METABOLIC PANEL: CPT

## 2018-06-17 PROCEDURE — 700105 HCHG RX REV CODE 258: Performed by: HOSPITALIST

## 2018-06-17 PROCEDURE — 36415 COLL VENOUS BLD VENIPUNCTURE: CPT

## 2018-06-17 PROCEDURE — 99233 SBSQ HOSP IP/OBS HIGH 50: CPT | Performed by: HOSPITALIST

## 2018-06-17 PROCEDURE — 700105 HCHG RX REV CODE 258

## 2018-06-17 PROCEDURE — A9270 NON-COVERED ITEM OR SERVICE: HCPCS | Performed by: HOSPITALIST

## 2018-06-17 PROCEDURE — 82962 GLUCOSE BLOOD TEST: CPT

## 2018-06-17 PROCEDURE — 700111 HCHG RX REV CODE 636 W/ 250 OVERRIDE (IP): Performed by: NURSE PRACTITIONER

## 2018-06-17 PROCEDURE — 85025 COMPLETE CBC W/AUTO DIFF WBC: CPT

## 2018-06-17 PROCEDURE — 700102 HCHG RX REV CODE 250 W/ 637 OVERRIDE(OP): Performed by: HOSPITALIST

## 2018-06-17 PROCEDURE — 770006 HCHG ROOM/CARE - MED/SURG/GYN SEMI*

## 2018-06-17 RX ORDER — AMOXICILLIN AND CLAVULANATE POTASSIUM 875; 125 MG/1; MG/1
1 TABLET, FILM COATED ORAL EVERY 12 HOURS
Status: DISCONTINUED | OUTPATIENT
Start: 2018-06-17 | End: 2018-06-18

## 2018-06-17 RX ORDER — SODIUM CHLORIDE 9 MG/ML
INJECTION, SOLUTION INTRAVENOUS
Status: COMPLETED
Start: 2018-06-17 | End: 2018-06-17

## 2018-06-17 RX ADMIN — INSULIN GLARGINE 23 UNITS: 100 INJECTION, SOLUTION SUBCUTANEOUS at 21:32

## 2018-06-17 RX ADMIN — IBUPROFEN 800 MG: 800 TABLET, FILM COATED ORAL at 08:49

## 2018-06-17 RX ADMIN — PIPERACILLIN SODIUM AND TAZOBACTAM SODIUM 3.38 G: 3; .375 INJECTION, POWDER, FOR SOLUTION INTRAVENOUS at 05:52

## 2018-06-17 RX ADMIN — IBUPROFEN 800 MG: 800 TABLET, FILM COATED ORAL at 11:35

## 2018-06-17 RX ADMIN — AMOXICILLIN AND CLAVULANATE POTASSIUM 1 TABLET: 875; 125 TABLET, FILM COATED ORAL at 14:36

## 2018-06-17 RX ADMIN — SODIUM CHLORIDE: 9 INJECTION, SOLUTION INTRAVENOUS at 14:37

## 2018-06-17 RX ADMIN — ACETAMINOPHEN 1000 MG: 500 TABLET ORAL at 00:27

## 2018-06-17 RX ADMIN — IBUPROFEN 800 MG: 800 TABLET, FILM COATED ORAL at 17:32

## 2018-06-17 RX ADMIN — SODIUM CHLORIDE: 9 INJECTION, SOLUTION INTRAVENOUS at 06:03

## 2018-06-17 RX ADMIN — ACETAMINOPHEN 1000 MG: 500 TABLET ORAL at 05:52

## 2018-06-17 RX ADMIN — ACETAMINOPHEN 1000 MG: 500 TABLET ORAL at 11:34

## 2018-06-17 RX ADMIN — HEPARIN SODIUM 5000 UNITS: 5000 INJECTION, SOLUTION INTRAVENOUS; SUBCUTANEOUS at 17:32

## 2018-06-17 RX ADMIN — HEPARIN SODIUM 5000 UNITS: 5000 INJECTION, SOLUTION INTRAVENOUS; SUBCUTANEOUS at 08:49

## 2018-06-17 RX ADMIN — AMOXICILLIN AND CLAVULANATE POTASSIUM 1 TABLET: 875; 125 TABLET, FILM COATED ORAL at 21:29

## 2018-06-17 RX ADMIN — ACETAMINOPHEN 1000 MG: 500 TABLET ORAL at 17:31

## 2018-06-17 RX ADMIN — SODIUM CHLORIDE: 9 INJECTION, SOLUTION INTRAVENOUS at 05:45

## 2018-06-17 RX ADMIN — HEPARIN SODIUM 5000 UNITS: 5000 INJECTION, SOLUTION INTRAVENOUS; SUBCUTANEOUS at 00:28

## 2018-06-17 ASSESSMENT — ENCOUNTER SYMPTOMS
TINGLING: 0
COUGH: 0
PALPITATIONS: 0
CHILLS: 0
BACK PAIN: 0
SHORTNESS OF BREATH: 0
DEPRESSION: 0
NECK PAIN: 0
EYE PAIN: 0
NAUSEA: 0
INSOMNIA: 0
ABDOMINAL PAIN: 0
SORE THROAT: 0
DIZZINESS: 0
FEVER: 0
BLURRED VISION: 0

## 2018-06-17 ASSESSMENT — PAIN SCALES - GENERAL: PAINLEVEL_OUTOF10: 0

## 2018-06-17 NOTE — WOUND TEAM
"[facility]  Inpatient Services  New Ostomy Management & Teaching    HPI:  Reviewed  PMH: Reviewed   SH: Reviewed    Subjective: \"I thought Desirae was going to be here to do this.\"    Objective: appliance intact     Ostomy type: end colostomy   Stoma location: LUQ   Stoma assessment:    Appearance: red, moist oval, bleeding    Size: 2.25\"    Protrusion:flush    Output: moderate brown/green    MC jxn intact    Peristomal skin: intact     Ostomy Appliance (type and size): two piece 2.75\" with oval convex ring and paste ring    Interventions:removed appliance, cleaned skin with warm moist wash cloth. Checked fit of oval convex ring, cut barrier to fit. Skin dried. Applied convex ring to barrier, then applied paste ring over the convex ring, applied barrier, started to attach pouch and pt finished, he also had closed the end of the pouch.      Pt education: Questions and concerns addressed    Evaluation: New colostomy with output and flatus. Oval in crease, may need belt; trying convexity now.     Plan: Ostomy nurses to continue to follow for ostomy needs and teaching, need to see again tomorrow to check if appliance worked and continue education. Pt may DC Wed. Need to [employer] catalog and paperwork.    Anticipated discharge needs: Supplies, supplier information, possible HH or outpatient ostomy clinic     [facility]  Inpatient Services  Wound and Skin Care Progress Note    HPI, PMH, SH: Reviewed  Unit where seen by Wound Team: [id number]    WOUND TEAM FOLLOW UP: RN stated wound care to check LLQ wound    SUBJECTIVE:  \"Am I going to have to pack it?\"    Self Report / Pain Level: some discomfort with reapplication of drsg  OBJECTIVE: drsg intact  WOUND TYPE, LOCATION, CHARACTERISTICS:  Wound POA Open Surgical (Complicated) Abdomen Left Lateral  Periwound Skin: Red (abd crease)  Drainage : Minimal, Serosanguinous      Tissue Type and %:    100% red that is visible  Wound Edges:    open    Odor: "     None   Exposed structure(s):   No   Signs and Symptoms of Infection: none    Measurements : taken 6/17/18  Length (cm): 1.3  Width (cm): 1.5  Depth (cm): 3      Tracts/undermining: None probed    INTERVENTIONS BY WOUND TEAM: removed drsg, irrigated wound with NS, dried. Loosely filled wound bed with strip of silver hydrofiber, covered with adhesive foam.   Dressing Options: Hydrofiber Silver, Foam  Interdisciplinary consultation:  RN, pt    EVALUATION AND PROGRESS OF WOUND(S): pt has debrided fistula site. Silver hydrofiber used for antimicrobial.     Factors affecting wound healing: abdominal crease, HTN, obesity     Goals: decreased wound size weekly    NURSING PLAN OF CARE:    Dressing changes: Continue previous Dressing Maintenance orders:        See new Dressing Maintenance orders:    x   Skin care: See Skin Care orders:        Rectal tube care: See Rectal Tube Care orders:      Other orders:           WOUND TEAM PLAN OF CARE (X):   NPWT change 3 x week:        Dressing changes:       Follow up as needed:    x   Other:

## 2018-06-17 NOTE — PROGRESS NOTES
[facility] Progress Note    Date of Service: 2018    Chief Complaint  67 y.o. male admitted 2018 with abdominal wound drainage and a significant history of diverticulitis and abscess in past. Now s/p partial sigmoid resection and colostomy.         Interval Problem Update  Did well over night.   learning to use ostomy well  No complaints.       Consultants/Specialty  surgery    Disposition  Suspect home soon without needs.   Ok to go when cleared by surgery.     CT abdomen and pelvis with contrast from outside facility with with persistent wall thickening with diverticula along the sigmoid colon adjacent extesnive soft tissue thickening, stranding and a few bubbles of gas extending along the thickening into abdominal wall musculature on the left side is also some soft tissue thickening along subcutaneous tissues extending out to the skin.  Prior nephrectomy         Review of Systems   Constitutional: Negative for chills and fever.   HENT: Negative for sore throat.    Eyes: Negative for blurred vision and pain.   Respiratory: Negative for cough and shortness of breath.    Cardiovascular: Negative for chest pain and palpitations.   Gastrointestinal: Negative for abdominal pain and nausea.   Genitourinary: Negative for dysuria and urgency.   Musculoskeletal: Negative for back pain and neck pain.   Skin: Negative for itching and rash.   Neurological: Negative for dizziness and tingling.   Psychiatric/Behavioral: Negative for depression. The patient does not have insomnia.    All other systems reviewed and are negative.     Physical Exam  Laboratory/Imaging   Hemodynamics  Temp (24hrs), Av.3 °C (97.4 °F), Min:36.1 °C (96.9 °F), Max:36.6 °C (97.8 °F)   Temperature: 36.3 °C (97.3 °F)  Pulse  Av.1  Min: 59  Max: 86    Blood Pressure : 132/89      Respiratory      Respiration: 18, Pulse Oximetry: 93 %        RUL Breath Sounds: Clear, RML Breath Sounds: Clear;Diminished, RLL Breath Sounds:  Diminished, NII Breath Sounds: Clear, LLL Breath Sounds: Diminished    Fluids    Intake/Output Summary (Last 24 hours) at 06/17/18 0900  Last data filed at 06/17/18 0735   Gross per 24 hour   Intake             2460 ml   Output             2400 ml   Net               60 ml       Nutrition  Orders Placed This Encounter   Procedures   • DIET ORDER     Standing Status:   Standing     Number of Occurrences:   1     Order Specific Question:   Diet:     Answer:   Low Fiber(GI Soft) [2]     Order Specific Question:   Miscellaneous modifications:     Answer:   6 Small Meals [4]     Physical Exam   Constitutional: He is oriented to person, place, and time. He appears well-developed and well-nourished. No distress.   Patient seen and examined  Plan discussed with RN   HENT:   Right Ear: External ear normal.   Left Ear: External ear normal.   Mouth/Throat: Oropharynx is clear and moist.   Eyes: Right eye exhibits no discharge. Left eye exhibits no discharge. No scleral icterus.   Neck: No JVD present. No tracheal deviation present.   Cardiovascular: Normal rate, normal heart sounds and intact distal pulses.    No murmur heard.  Cap refill 2sec  Pulses 2+ throughout     Pulmonary/Chest: Effort normal and breath sounds normal. No respiratory distress. He has no rales.   Abdominal: Soft. Bowel sounds are normal. He exhibits no distension. There is no tenderness.   Llq wound with what appear to be stool leaking from it.    Musculoskeletal: He exhibits no edema or tenderness.   Neurological: He is alert and oriented to person, place, and time.   Skin: Skin is warm and dry. He is not diaphoretic. No erythema.   Normal skin color   Psychiatric: He has a normal mood and affect. His behavior is normal.   Nursing note and vitals reviewed.      Recent Labs      06/15/18   0226  06/16/18   0326  06/17/18   0549   WBC  6.1  12.7*  7.3   RBC  4.45*  4.56*  4.12*   HEMOGLOBIN  11.7*  12.2*  11.3*   HEMATOCRIT  37.4*  37.9*  34.3*   MCV  84.0   83.1  83.3   MCH  26.3*  26.8*  27.4   MCHC  31.3*  32.2*  32.9*   RDW  43.8  43.2  43.9   PLATELETCT  280  286  277   MPV  8.9*  9.0  9.2     Recent Labs      06/15/18   0226  06/16/18   0326  06/17/18   0549   SODIUM  134*  131*  134*   POTASSIUM  3.8  3.9  3.7   CHLORIDE  104  99  104   CO2  21  21  22   GLUCOSE  83  164*  97   BUN  12  10  9   CREATININE  0.77  0.75  0.82   CALCIUM  8.5  8.6  8.4*     Recent Labs      06/14/18   1152   APTT  30.3   INR  1.12                  Assessment/Plan     * Abdominal wall fistula   Assessment & Plan    S/p diverting colostomy.    Pain controlled  Trend lytes  Change to po abx.         Hyperglycemia   Assessment & Plan    a1c 6.2  Consistent with glucose intolerance  Weight loss and metformin in future.         Hyponatremia   Assessment & Plan    Iv fluids and trend  Suspect hypovoilemic from fistula/infection        Abscess of abdominal cavity (HCC)   Assessment & Plan    resolved        HTN (hypertension)   Assessment & Plan    Continue lisinopril        Renal cell adenocarcinoma (HCC)   Assessment & Plan    Hx in past with hx of nephrectomy        H/O unilateral nephrectomy   Assessment & Plan    Watch for nephrotoxic drugs          Diverticulitis   Assessment & Plan    Recurrent.  On antibiotic therapy for the same.  Here with abscess and fistula formation  Iv abx, surgery intervention.   Change to po abx.         Obesity (BMI 35.0-39.9 without comorbidity)   Assessment & Plan    Body mass index is 38.19 kg/m².            Quality-Core Measures   Reviewed items::  EKG reviewed, Radiology images reviewed, Labs reviewed and Medications reviewed  Dyer catheter::  No Dyer

## 2018-06-17 NOTE — ASSESSMENT & PLAN NOTE
Borderline diabetic-A1c 6.2-has been seen by diabetes educator  Primary care follow-up-future metformin-sugars here are acceptable

## 2018-06-17 NOTE — CARE PLAN
Problem: Infection  Goal: Will remain free from infection  Iv abx continued, labs and VS being monitored      Problem: Bowel/Gastric:  Goal: Normal bowel function is maintained or improved  Pt having +ostomy output, stool softeners held per active BM's

## 2018-06-17 NOTE — PROGRESS NOTES
"Surgical Progress Note:    POD #2 S/P Davinci LAR with end colostomy and takedown of enterocutaneous fistula.   Doing well. Neg N/V, Ostomy with + FLATUS and stool from ostomy, Pain controlled, Denies chest pain or SOB.  Ambulating. Tolerating PO.    PE:  /89   Pulse 73   Temp 36.3 °C (97.3 °F)   Resp 18   Ht 1.753 m (5' 9\")   Wt 117.3 kg (258 lb 9.6 oz)   SpO2 93%   BMI 38.19 kg/m²     I/O:   Intake/Output Summary (Last 24 hours) at 06/16/18 1129  Last data filed at 06/16/18 0700   Gross per 24 hour   Intake             1125 ml   Output             1225 ml   Net             -100 ml     Review of Systems   Constitutional: Negative.  Negative for chills and fever.   Respiratory: Negative for shortness of breath.    Cardiovascular: Negative for chest pain.   Gastrointestinal: Negative for nausea and vomiting.        +flatus/stool   Genitourinary: Negative.      Physical Exam   Constitutional:  appears well-developed.   Neck: Neck supple.   Cardiovascular: Normal rate.    Pulmonary/Chest: Effort normal.   Abdominal: Soft.  exhibits no distension. Appropriate tenderness.   Incisions clean, dry, and intact  Stoma pink and viable  Musculoskeletal: Normal range of motion.   Neurological:  alert.   Skin:  Warm and dry.   Extremities: polo, no edema    Labs:  Recent Labs      06/15/18   0226  06/16/18   0326  06/17/18   0549   WBC  6.1  12.7*  7.3   RBC  4.45*  4.56*  4.12*   HEMOGLOBIN  11.7*  12.2*  11.3*   HEMATOCRIT  37.4*  37.9*  34.3*   MCV  84.0  83.1  83.3   MCH  26.3*  26.8*  27.4   RDW  43.8  43.2  43.9   PLATELETCT  280  286  277   MPV  8.9*  9.0  9.2   NEUTSPOLYS  66.50  92.40*  74.80*   LYMPHOCYTES  18.90*  3.50*  14.00*   MONOCYTES  9.50  3.50  8.70   EOSINOPHILS  4.40  0.00  1.90   BASOPHILS  0.50  0.30  0.30     Recent Labs      06/15/18   0226  06/16/18   0326 06/17/18   0549   SODIUM  134*  131*  134*   POTASSIUM  3.8  3.9  3.7   CHLORIDE  104  99  104   CO2  21  21  22   GLUCOSE  83  164*  97 "   BUN  12  10  9         A/P:   - Regular low residue diet as tolerated.  - IS Q One hour while awake  - Ambulate.  Out of bed for all meals please  - Pain controlled.  Cont PO pain medication  - Labs noted, recheck in am  - DVT propholaxis, ok to cont heparin, sequentials and ambulate  - Adequate urine output.  Cont, to monitor  - cont Zosyn X 4 days then home on PO due to colocutaneous fistula from perf diverticulitis  - wound care to see pt regarding new ostomy and LLQ wound  - shower ok      Discussed with Patient, RN and Dr. Parul VELASCO  [facility]  [phone]

## 2018-06-17 NOTE — PROGRESS NOTES
Pt A&O x's 4, VSS, denies any pain at this time. Pt has +void, +loose ostomy putput, +flatus. LLQ wound with DIP, CDI, will reinforce or change if needed. IVF's and abx running at bedside. POC discussed with pt, all questions and concerns have been addressed at this time, bed in locked/lowest position, call light, urinal and personal items within reach. Will continue to monitor.

## 2018-06-17 NOTE — PROGRESS NOTES
Pt aox4. No visible signs of distress. VSS.  Tolerating medication regimen without any signs or symptoms of adverse reactions.  No complaints of pain.  Tolerating diet without n/v  No reports of SOB.  Colostomy patent and draining without issues or leaks.  Bed locked and in low position. Call light within reach and able to make all needs be known.  Will continue to monitor.

## 2018-06-18 LAB
ALBUMIN SERPL BCP-MCNC: 3.1 G/DL (ref 3.2–4.9)
ALBUMIN/GLOB SERPL: 0.9 G/DL
ALP SERPL-CCNC: 42 U/L (ref 30–99)
ALT SERPL-CCNC: 10 U/L (ref 2–50)
ANION GAP SERPL CALC-SCNC: 8 MMOL/L (ref 0–11.9)
AST SERPL-CCNC: 17 U/L (ref 12–45)
BASOPHILS # BLD AUTO: 0.3 % (ref 0–1.8)
BASOPHILS # BLD: 0.02 K/UL (ref 0–0.12)
BILIRUB SERPL-MCNC: 0.7 MG/DL (ref 0.1–1.5)
BUN SERPL-MCNC: 7 MG/DL (ref 8–22)
CALCIUM SERPL-MCNC: 8 MG/DL (ref 8.5–10.5)
CHLORIDE SERPL-SCNC: 105 MMOL/L (ref 96–112)
CO2 SERPL-SCNC: 24 MMOL/L (ref 20–33)
CREAT SERPL-MCNC: 0.65 MG/DL (ref 0.5–1.4)
EOSINOPHIL # BLD AUTO: 0.34 K/UL (ref 0–0.51)
EOSINOPHIL NFR BLD: 5.1 % (ref 0–6.9)
ERYTHROCYTE [DISTWIDTH] IN BLOOD BY AUTOMATED COUNT: 44.6 FL (ref 35.9–50)
GLOBULIN SER CALC-MCNC: 3.6 G/DL (ref 1.9–3.5)
GLUCOSE BLD-MCNC: 115 MG/DL (ref 65–99)
GLUCOSE BLD-MCNC: 130 MG/DL (ref 65–99)
GLUCOSE BLD-MCNC: 82 MG/DL (ref 65–99)
GLUCOSE BLD-MCNC: 97 MG/DL (ref 65–99)
GLUCOSE SERPL-MCNC: 92 MG/DL (ref 65–99)
HCT VFR BLD AUTO: 33.7 % (ref 42–52)
HGB BLD-MCNC: 11.1 G/DL (ref 14–18)
IMM GRANULOCYTES # BLD AUTO: 0.04 K/UL (ref 0–0.11)
IMM GRANULOCYTES NFR BLD AUTO: 0.6 % (ref 0–0.9)
LYMPHOCYTES # BLD AUTO: 0.92 K/UL (ref 1–4.8)
LYMPHOCYTES NFR BLD: 13.9 % (ref 22–41)
MCH RBC QN AUTO: 27.4 PG (ref 27–33)
MCHC RBC AUTO-ENTMCNC: 32.9 G/DL (ref 33.7–35.3)
MCV RBC AUTO: 83.2 FL (ref 81.4–97.8)
MONOCYTES # BLD AUTO: 0.52 K/UL (ref 0–0.85)
MONOCYTES NFR BLD AUTO: 7.8 % (ref 0–13.4)
NEUTROPHILS # BLD AUTO: 4.8 K/UL (ref 1.82–7.42)
NEUTROPHILS NFR BLD: 72.3 % (ref 44–72)
NRBC # BLD AUTO: 0 K/UL
NRBC BLD-RTO: 0 /100 WBC
PLATELET # BLD AUTO: 274 K/UL (ref 164–446)
PMV BLD AUTO: 9 FL (ref 9–12.9)
POTASSIUM SERPL-SCNC: 3.2 MMOL/L (ref 3.6–5.5)
PROT SERPL-MCNC: 6.7 G/DL (ref 6–8.2)
RBC # BLD AUTO: 4.05 M/UL (ref 4.7–6.1)
SODIUM SERPL-SCNC: 137 MMOL/L (ref 135–145)
WBC # BLD AUTO: 6.6 K/UL (ref 4.8–10.8)

## 2018-06-18 PROCEDURE — 700105 HCHG RX REV CODE 258: Performed by: HOSPITALIST

## 2018-06-18 PROCEDURE — 700111 HCHG RX REV CODE 636 W/ 250 OVERRIDE (IP): Performed by: NURSE PRACTITIONER

## 2018-06-18 PROCEDURE — 770006 HCHG ROOM/CARE - MED/SURG/GYN SEMI*

## 2018-06-18 PROCEDURE — 85025 COMPLETE CBC W/AUTO DIFF WBC: CPT

## 2018-06-18 PROCEDURE — 36415 COLL VENOUS BLD VENIPUNCTURE: CPT

## 2018-06-18 PROCEDURE — A9270 NON-COVERED ITEM OR SERVICE: HCPCS | Performed by: HOSPITALIST

## 2018-06-18 PROCEDURE — A9270 NON-COVERED ITEM OR SERVICE: HCPCS | Performed by: NURSE PRACTITIONER

## 2018-06-18 PROCEDURE — 700102 HCHG RX REV CODE 250 W/ 637 OVERRIDE(OP): Performed by: HOSPITALIST

## 2018-06-18 PROCEDURE — 80053 COMPREHEN METABOLIC PANEL: CPT

## 2018-06-18 PROCEDURE — 82962 GLUCOSE BLOOD TEST: CPT

## 2018-06-18 PROCEDURE — 700102 HCHG RX REV CODE 250 W/ 637 OVERRIDE(OP): Performed by: NURSE PRACTITIONER

## 2018-06-18 PROCEDURE — 99232 SBSQ HOSP IP/OBS MODERATE 35: CPT | Performed by: HOSPITALIST

## 2018-06-18 PROCEDURE — 700111 HCHG RX REV CODE 636 W/ 250 OVERRIDE (IP): Performed by: HOSPITALIST

## 2018-06-18 RX ORDER — POTASSIUM CHLORIDE 20 MEQ/1
40 TABLET, EXTENDED RELEASE ORAL ONCE
Status: COMPLETED | OUTPATIENT
Start: 2018-06-18 | End: 2018-06-18

## 2018-06-18 RX ADMIN — PIPERACILLIN SODIUM AND TAZOBACTAM SODIUM 3.38 G: 3; .375 INJECTION, POWDER, FOR SOLUTION INTRAVENOUS at 20:49

## 2018-06-18 RX ADMIN — ACETAMINOPHEN 1000 MG: 500 TABLET ORAL at 12:33

## 2018-06-18 RX ADMIN — HEPARIN SODIUM 5000 UNITS: 5000 INJECTION, SOLUTION INTRAVENOUS; SUBCUTANEOUS at 17:49

## 2018-06-18 RX ADMIN — IBUPROFEN 800 MG: 800 TABLET, FILM COATED ORAL at 12:33

## 2018-06-18 RX ADMIN — HEPARIN SODIUM 5000 UNITS: 5000 INJECTION, SOLUTION INTRAVENOUS; SUBCUTANEOUS at 00:20

## 2018-06-18 RX ADMIN — ACETAMINOPHEN 1000 MG: 500 TABLET ORAL at 05:57

## 2018-06-18 RX ADMIN — PIPERACILLIN SODIUM AND TAZOBACTAM SODIUM 3.38 G: 3; .375 INJECTION, POWDER, FOR SOLUTION INTRAVENOUS at 10:01

## 2018-06-18 RX ADMIN — AMOXICILLIN AND CLAVULANATE POTASSIUM 1 TABLET: 875; 125 TABLET, FILM COATED ORAL at 08:03

## 2018-06-18 RX ADMIN — ACETAMINOPHEN 1000 MG: 500 TABLET ORAL at 17:46

## 2018-06-18 RX ADMIN — INSULIN GLARGINE 23 UNITS: 100 INJECTION, SOLUTION SUBCUTANEOUS at 20:53

## 2018-06-18 RX ADMIN — IBUPROFEN 800 MG: 800 TABLET, FILM COATED ORAL at 08:03

## 2018-06-18 RX ADMIN — ONDANSETRON 4 MG: 2 INJECTION INTRAMUSCULAR; INTRAVENOUS at 00:25

## 2018-06-18 RX ADMIN — SODIUM CHLORIDE: 9 INJECTION, SOLUTION INTRAVENOUS at 03:35

## 2018-06-18 RX ADMIN — ACETAMINOPHEN 1000 MG: 500 TABLET ORAL at 00:20

## 2018-06-18 RX ADMIN — IBUPROFEN 800 MG: 800 TABLET, FILM COATED ORAL at 17:46

## 2018-06-18 RX ADMIN — HEPARIN SODIUM 5000 UNITS: 5000 INJECTION, SOLUTION INTRAVENOUS; SUBCUTANEOUS at 08:03

## 2018-06-18 RX ADMIN — POTASSIUM CHLORIDE 40 MEQ: 1500 TABLET, EXTENDED RELEASE ORAL at 10:00

## 2018-06-18 RX ADMIN — PIPERACILLIN SODIUM AND TAZOBACTAM SODIUM 3.38 G: 3; .375 INJECTION, POWDER, FOR SOLUTION INTRAVENOUS at 12:39

## 2018-06-18 ASSESSMENT — ENCOUNTER SYMPTOMS
NAUSEA: 0
FEVER: 0
COUGH: 0
EYE PAIN: 0
ABDOMINAL PAIN: 0
TINGLING: 0
SORE THROAT: 0
DEPRESSION: 0
BACK PAIN: 0
BLURRED VISION: 0
CHILLS: 0
DIZZINESS: 0
NECK PAIN: 0
PALPITATIONS: 0
SHORTNESS OF BREATH: 0
INSOMNIA: 0

## 2018-06-18 ASSESSMENT — PAIN SCALES - GENERAL: PAINLEVEL_OUTOF10: 0

## 2018-06-18 NOTE — PROGRESS NOTES
[facility]ist Progress Note    Date of Service: 2018    Chief Complaint  67 y.o. male admitted 2018 with abdominal wound drainage and a significant history of diverticulitis and abscess in past. Now s/p partial sigmoid resection and colostomy.         Interval Problem Update  K replaced  No events ove rnight  No pain  Ostomy intact and he is working with ostomy care to learn how to care for it.   We discussed need for additional days of antibiotics.         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   Ok to go when cleared by surgery.     CT abdomen and pelvis with contrast from outside facility with with persistent wall thickening with diverticula along the sigmoid colon adjacent extesnive soft tissue thickening, stranding and a few bubbles of gas extending along the thickening into abdominal wall musculature on the left side is also some soft tissue thickening along subcutaneous tissues extending out to the skin.  Prior nephrectomy         Review of Systems   Constitutional: Negative for chills and fever.   HENT: Negative for sore throat.    Eyes: Negative for blurred vision and pain.   Respiratory: Negative for cough and shortness of breath.    Cardiovascular: Negative for chest pain and palpitations.   Gastrointestinal: Negative for abdominal pain and nausea.   Genitourinary: Negative for dysuria and urgency.   Musculoskeletal: Negative for back pain and neck pain.   Skin: Negative for itching and rash.   Neurological: Negative for dizziness and tingling.   Psychiatric/Behavioral: Negative for depression. The patient does not have insomnia.    All other systems reviewed and are negative.     Physical Exam  Laboratory/Imaging   Hemodynamics  Temp (24hrs), Av.7 °C (98.1 °F), Min:36.4 °C (97.5 °F), Max:36.9 °C (98.5 °F)   Temperature: 36.8 °C  (98.2 °F)  Pulse  Av.9  Min: 59  Max: 86    Blood Pressure : 140/87      Respiratory      Respiration: 18, Pulse Oximetry: 93 %             Fluids    Intake/Output Summary (Last 24 hours) at 18 0816  Last data filed at 18 0550   Gross per 24 hour   Intake             1920 ml   Output             1745 ml   Net              175 ml       Nutrition  Orders Placed This Encounter   Procedures   • DIET ORDER     Standing Status:   Standing     Number of Occurrences:   1     Order Specific Question:   Diet:     Answer:   Low Fiber(GI Soft) [2]     Order Specific Question:   Miscellaneous modifications:     Answer:   6 Small Meals [4]     Physical Exam   Constitutional: He is oriented to person, place, and time. He appears well-developed and well-nourished. No distress.   Patient seen and examined  Plan discussed with RN   FERNANDOT:   Right Ear: External ear normal.   Left Ear: External ear normal.   Nose: Nose normal.   Eyes: Conjunctivae and EOM are normal. Right eye exhibits no discharge. Left eye exhibits no discharge. No scleral icterus.   Neck: No JVD present. No tracheal deviation present.   Cardiovascular: Normal rate, normal heart sounds and intact distal pulses.    No murmur heard.       Pulmonary/Chest: Effort normal and breath sounds normal. No respiratory distress. He has no rales.   Abdominal: Soft. Bowel sounds are normal. He exhibits no distension. There is no tenderness.   Ostomy in place LLQ  C/d/i   Musculoskeletal: He exhibits no edema or tenderness.   Neurological: He is alert and oriented to person, place, and time.   Skin: Skin is warm and dry. He is not diaphoretic. No erythema.   Psychiatric: He has a normal mood and affect. His behavior is normal.   Nursing note and vitals reviewed.      Recent Labs      18   0326  18   0549  18   0135   WBC  12.7*  7.3  6.6   RBC  4.56*  4.12*  4.05*   HEMOGLOBIN  12.2*  11.3*  11.1*   HEMATOCRIT  37.9*  34.3*  33.7*   MCV  83.1  83.3   83.2   MCH  26.8*  27.4  27.4   MCHC  32.2*  32.9*  32.9*   RDW  43.2  43.9  44.6   PLATELETCT  286  277  274   MPV  9.0  9.2  9.0     Recent Labs      06/16/18   0326  06/17/18   0549  06/18/18   0135   SODIUM  131*  134*  137   POTASSIUM  3.9  3.7  3.2*   CHLORIDE  99  104  105   CO2  21  22  24   GLUCOSE  164*  97  92   BUN  10  9  7*   CREATININE  0.75  0.82  0.65   CALCIUM  8.6  8.4*  8.0*                      Assessment/Plan     * Abdominal wall fistula   Assessment & Plan    S/p diverting colostomy.    Pain controlled  Trend lytes  Will change back to iv abx per surgery notes.   However he has completed over 4 days now  Likely ok with augmentin on discharge.         Hyperglycemia   Assessment & Plan    a1c 6.2  Consistent with glucose intolerance  Weight loss and metformin in future.         Hyponatremia   Assessment & Plan    Resolved with IV fluids.   hypovoilemic from fistula/infection        Abscess of abdominal cavity (HCC)   Assessment & Plan    resolved        HTN (hypertension)   Assessment & Plan    Continue lisinopril        Renal cell adenocarcinoma (HCC)   Assessment & Plan    Hx in past with hx of nephrectomy        H/O unilateral nephrectomy   Assessment & Plan    Watch for nephrotoxic drugs          Diverticulitis   Assessment & Plan    Recurrent.  On antibiotic therapy for the same.  Here with abscess and fistula formation  Iv abx, s/p surgery intervention.           Obesity (BMI 35.0-39.9 without comorbidity)   Assessment & Plan    Body mass index is 38.19 kg/m².            Quality-Core Measures   Reviewed items::  EKG reviewed, Radiology images reviewed, Labs reviewed and Medications reviewed  Dyer catheter::  No Dyer

## 2018-06-18 NOTE — WOUND TEAM
" [facility]  Inpatient Services  New Ostomy Management & Teaching    HPI:  Reviewed  PMH: Reviewed   SH: Reviewed    Subjective: \"I'm going to empty this\"    Objective: appliance leaked on night shift and changed; leaking under barrier when removed     Ostomy type:  colostomy    Stoma location: LUQ   Stoma assessment:    Appearance: Red, edematous    Size: ~ 2\"    Protrusion:  slightly budded    Output:   Small watery loose brown    MC jxn  intact    Peristomal skin: intact     Ostomy Appliance (type and size):  2 1/8\" one piece convex with paste ring and belt    Interventions:  Met with patient and questions answered from patient reviewing booklet.  He reports he has Medicare insurance and [employer].  He also reports living very close to the [facility].  No template in bag as staff had changed appliance during the night.  Emptied pouch with patient and instructed how to clean end of pouch and close end of pouch which patient did.  He removed old appliance and cleansed peristomal skin with some prompting.  I cut barrier and instructed patient on one piece versus two piece appliance and rationale as patient is leaking at 3 and 9 o'clock due to crease.  I applied paste ring to barrier opening and applied to peristomal skin and patient completed application and closed end of pouch.  Belt applied.  Changed dressing lower abdominal wound which patient must hold abdominal pannus.  Discussed with case management to follow up for home health and ostomy supplies per [facility].     Pt education: Questions and concerns addressed    Evaluation: Patient slowly learning self care.  He needs to hold abdomen upward with appliance placement and will need someone to assist him.  Also will need daily or every other day dressing change lower abdomen wound.    Plan: Ostomy RN to change with patient tomorrow to evaluate how this appliance lasted and [employer] catalog for supplies.     Anticipated discharge " needs: Supplies, supplier information, possible HH or outpatient ostomy clinic

## 2018-06-18 NOTE — PROGRESS NOTES
Pt aox4. No visible signs of distress. VSS.  Tolerating medication regimen.  No complaints of pain.  No reports of SOB.  Pt had one episode of nausea without emesis. Zofran effective.  Colostomy bag changed x1 this shift, with patient's assistance.  Bed locked and in low position.  Call light within reach and able to make all needs be known.   Will continue to monitor.

## 2018-06-18 NOTE — PROGRESS NOTES
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   Doing well. Neg N/V, Ostomy with + FLATUS and stool from ostomy, Pain controlled, Denies chest pain or SOB.  Ambulating. Tolerating PO.  Has not done much with taking care of ostomy.    PE:  /87   Pulse 76   Temp 36.8 °C (98.2 °F)   Resp 18   Ht 1.753 m (5' 9\")   Wt 119.8 kg (264 lb 1.8 oz)   SpO2 93%   BMI 39.00 kg/m²     I/O:   Intake/Output Summary (Last 24 hours) at 06/16/18 1129  Last data filed at 06/16/18 0700   Gross per 24 hour   Intake             1125 ml   Output             1225 ml   Net             -100 ml     Review of Systems   Constitutional: Negative.  Negative for chills and fever.   Respiratory: Negative for shortness of breath.    Cardiovascular: Negative for chest pain.   Gastrointestinal: Negative for nausea and vomiting.        +flatus/stool   Genitourinary: Negative.      Physical Exam   Constitutional:  appears well-developed.   Neck: Neck supple.   Cardiovascular: Normal rate.    Pulmonary/Chest: Effort normal.   Abdominal: Soft.  exhibits no distension. Appropriate tenderness.   Incisions clean, dry, and intact  Stoma pink and viable  Musculoskeletal: Normal range of motion.   Neurological:  alert.   Skin:  Warm and dry.   Extremities: polo, no edema    Labs:  Recent Labs      06/16/18   0326  06/17/18   0549  06/18/18   0135   WBC  12.7*  7.3  6.6   RBC  4.56*  4.12*  4.05*   HEMOGLOBIN  12.2*  11.3*  11.1*   HEMATOCRIT  37.9*  34.3*  33.7*   MCV  83.1  83.3  83.2   MCH  26.8*  27.4  27.4   RDW  43.2  43.9  44.6   PLATELETCT  286  277  274   MPV  9.0  9.2  9.0   NEUTSPOLYS  92.40*  74.80*  72.30*   LYMPHOCYTES  3.50*  14.00*  13.90*   MONOCYTES  3.50  8.70  7.80   EOSINOPHILS  0.00  1.90  5.10   BASOPHILS  0.30  0.30  0.30     Recent Labs      06/16/18   0326  06/17/18   0549  06/18/18   0135   SODIUM  131*  134*  137   POTASSIUM  3.9  3.7  3.2*   CHLORIDE  99  104  " 105   CO2  21  22  24   GLUCOSE  164*  97  92   BUN  10  9  7*         A/P:   - Regular low residue diet as tolerated.  - IS Q One hour while awake  - Ambulate.  Out of bed for all meals please  - Pain controlled.  Cont PO pain medication  - Labs noted, recheck in am  - DVT propholaxis, ok to cont heparin, sequentials and ambulate  - Adequate urine output.  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      Discussed with Patient, RN and Dr. Imer VELASCO  [facility]  [phone]

## 2018-06-18 NOTE — PROGRESS NOTES
Report received from NOC shift RN.   A/O X 4. Room air.   VSS. Labs reviewed.   K+ at 3.2.   BUN at 7.   Ca at 8.0  Albumin at 3.1  BS hypoactive X 4.   X 4 lap incisions approximated with dermabond.   Fistula incision located at pannus fold on LLQ. Dressed with adhesive foam gauze. Scant drainage present.   Ostomy present on LUQ producing moderate green watery stool.   Reports of nausea last evening. None this am.   +void.   Patient educated on ostomy self care.   PIV SL this am per new order.   Patient denies pain at this time.   Patient ambulating with standby assist.   Call light at bedside.

## 2018-06-19 LAB
ALBUMIN SERPL BCP-MCNC: 3.3 G/DL (ref 3.2–4.9)
ALBUMIN/GLOB SERPL: 0.9 G/DL
ALP SERPL-CCNC: 50 U/L (ref 30–99)
ALT SERPL-CCNC: 11 U/L (ref 2–50)
ANION GAP SERPL CALC-SCNC: 7 MMOL/L (ref 0–11.9)
AST SERPL-CCNC: 20 U/L (ref 12–45)
BASOPHILS # BLD AUTO: 0.6 % (ref 0–1.8)
BASOPHILS # BLD: 0.05 K/UL (ref 0–0.12)
BILIRUB SERPL-MCNC: 0.9 MG/DL (ref 0.1–1.5)
BUN SERPL-MCNC: 7 MG/DL (ref 8–22)
CALCIUM SERPL-MCNC: 8.7 MG/DL (ref 8.5–10.5)
CHLORIDE SERPL-SCNC: 104 MMOL/L (ref 96–112)
CO2 SERPL-SCNC: 23 MMOL/L (ref 20–33)
CREAT SERPL-MCNC: 0.7 MG/DL (ref 0.5–1.4)
EOSINOPHIL # BLD AUTO: 0.71 K/UL (ref 0–0.51)
EOSINOPHIL NFR BLD: 9 % (ref 0–6.9)
ERYTHROCYTE [DISTWIDTH] IN BLOOD BY AUTOMATED COUNT: 46.1 FL (ref 35.9–50)
GLOBULIN SER CALC-MCNC: 3.7 G/DL (ref 1.9–3.5)
GLUCOSE BLD-MCNC: 104 MG/DL (ref 65–99)
GLUCOSE BLD-MCNC: 119 MG/DL (ref 65–99)
GLUCOSE BLD-MCNC: 121 MG/DL (ref 65–99)
GLUCOSE BLD-MCNC: 91 MG/DL (ref 65–99)
GLUCOSE SERPL-MCNC: 98 MG/DL (ref 65–99)
HCT VFR BLD AUTO: 35.6 % (ref 42–52)
HGB BLD-MCNC: 11.4 G/DL (ref 14–18)
IMM GRANULOCYTES # BLD AUTO: 0.08 K/UL (ref 0–0.11)
IMM GRANULOCYTES NFR BLD AUTO: 1 % (ref 0–0.9)
LYMPHOCYTES # BLD AUTO: 1.7 K/UL (ref 1–4.8)
LYMPHOCYTES NFR BLD: 21.7 % (ref 22–41)
MCH RBC QN AUTO: 27.1 PG (ref 27–33)
MCHC RBC AUTO-ENTMCNC: 32 G/DL (ref 33.7–35.3)
MCV RBC AUTO: 84.6 FL (ref 81.4–97.8)
MONOCYTES # BLD AUTO: 0.51 K/UL (ref 0–0.85)
MONOCYTES NFR BLD AUTO: 6.5 % (ref 0–13.4)
NEUTROPHILS # BLD AUTO: 4.8 K/UL (ref 1.82–7.42)
NEUTROPHILS NFR BLD: 61.2 % (ref 44–72)
NRBC # BLD AUTO: 0 K/UL
NRBC BLD-RTO: 0 /100 WBC
PLATELET # BLD AUTO: 318 K/UL (ref 164–446)
PMV BLD AUTO: 9 FL (ref 9–12.9)
POTASSIUM SERPL-SCNC: 3.9 MMOL/L (ref 3.6–5.5)
PROT SERPL-MCNC: 7 G/DL (ref 6–8.2)
RBC # BLD AUTO: 4.21 M/UL (ref 4.7–6.1)
SODIUM SERPL-SCNC: 134 MMOL/L (ref 135–145)
WBC # BLD AUTO: 7.9 K/UL (ref 4.8–10.8)

## 2018-06-19 PROCEDURE — 99232 SBSQ HOSP IP/OBS MODERATE 35: CPT | Performed by: INTERNAL MEDICINE

## 2018-06-19 PROCEDURE — 700105 HCHG RX REV CODE 258: Performed by: HOSPITALIST

## 2018-06-19 PROCEDURE — 770006 HCHG ROOM/CARE - MED/SURG/GYN SEMI*

## 2018-06-19 PROCEDURE — 700111 HCHG RX REV CODE 636 W/ 250 OVERRIDE (IP): Performed by: HOSPITALIST

## 2018-06-19 PROCEDURE — 85025 COMPLETE CBC W/AUTO DIFF WBC: CPT

## 2018-06-19 PROCEDURE — 36415 COLL VENOUS BLD VENIPUNCTURE: CPT

## 2018-06-19 PROCEDURE — 302098 PASTE RING (FLAT): Performed by: NURSE PRACTITIONER

## 2018-06-19 PROCEDURE — 700111 HCHG RX REV CODE 636 W/ 250 OVERRIDE (IP): Performed by: NURSE PRACTITIONER

## 2018-06-19 PROCEDURE — 700102 HCHG RX REV CODE 250 W/ 637 OVERRIDE(OP): Performed by: NURSE PRACTITIONER

## 2018-06-19 PROCEDURE — 80053 COMPREHEN METABOLIC PANEL: CPT

## 2018-06-19 PROCEDURE — 82962 GLUCOSE BLOOD TEST: CPT

## 2018-06-19 PROCEDURE — A9270 NON-COVERED ITEM OR SERVICE: HCPCS | Performed by: NURSE PRACTITIONER

## 2018-06-19 RX ORDER — LISINOPRIL 10 MG/1
10 TABLET ORAL
Status: DISCONTINUED | OUTPATIENT
Start: 2018-06-19 | End: 2018-06-21 | Stop reason: HOSPADM

## 2018-06-19 RX ADMIN — PIPERACILLIN SODIUM AND TAZOBACTAM SODIUM 3.38 G: 3; .375 INJECTION, POWDER, FOR SOLUTION INTRAVENOUS at 06:01

## 2018-06-19 RX ADMIN — IBUPROFEN 800 MG: 800 TABLET, FILM COATED ORAL at 08:15

## 2018-06-19 RX ADMIN — HEPARIN SODIUM 5000 UNITS: 5000 INJECTION, SOLUTION INTRAVENOUS; SUBCUTANEOUS at 08:15

## 2018-06-19 RX ADMIN — HEPARIN SODIUM 5000 UNITS: 5000 INJECTION, SOLUTION INTRAVENOUS; SUBCUTANEOUS at 17:23

## 2018-06-19 RX ADMIN — IBUPROFEN 800 MG: 800 TABLET, FILM COATED ORAL at 17:23

## 2018-06-19 RX ADMIN — IBUPROFEN 800 MG: 800 TABLET, FILM COATED ORAL at 12:27

## 2018-06-19 RX ADMIN — ACETAMINOPHEN 1000 MG: 500 TABLET ORAL at 17:23

## 2018-06-19 RX ADMIN — ACETAMINOPHEN 1000 MG: 500 TABLET ORAL at 00:43

## 2018-06-19 RX ADMIN — ACETAMINOPHEN 1000 MG: 500 TABLET ORAL at 06:02

## 2018-06-19 RX ADMIN — PIPERACILLIN SODIUM AND TAZOBACTAM SODIUM 3.38 G: 3; .375 INJECTION, POWDER, FOR SOLUTION INTRAVENOUS at 14:03

## 2018-06-19 RX ADMIN — HEPARIN SODIUM 5000 UNITS: 5000 INJECTION, SOLUTION INTRAVENOUS; SUBCUTANEOUS at 00:43

## 2018-06-19 RX ADMIN — ACETAMINOPHEN 1000 MG: 500 TABLET ORAL at 12:27

## 2018-06-19 RX ADMIN — INSULIN GLARGINE 23 UNITS: 100 INJECTION, SOLUTION SUBCUTANEOUS at 21:41

## 2018-06-19 RX ADMIN — PIPERACILLIN SODIUM AND TAZOBACTAM SODIUM 3.38 G: 3; .375 INJECTION, POWDER, FOR SOLUTION INTRAVENOUS at 21:32

## 2018-06-19 ASSESSMENT — ENCOUNTER SYMPTOMS
SHORTNESS OF BREATH: 0
NAUSEA: 0
COUGH: 0
DIZZINESS: 0
SPUTUM PRODUCTION: 0
FEVER: 0
SORE THROAT: 0
WEAKNESS: 0
BLOOD IN STOOL: 0
DIARRHEA: 0
CHILLS: 0
PALPITATIONS: 0
WHEEZING: 0
VOMITING: 0
ABDOMINAL PAIN: 1
DEPRESSION: 0
CONSTIPATION: 0
MYALGIAS: 0

## 2018-06-19 ASSESSMENT — PAIN SCALES - GENERAL: PAINLEVEL_OUTOF10: 0

## 2018-06-19 NOTE — CARE PLAN
Problem: Infection  Goal: Will remain free from infection  Outcome: PROGRESSING AS EXPECTED  Pt on zosyn; will switch to po augmentin in next few days     Problem: Venous Thromboembolism (VTW)/Deep Vein Thrombosis (DVT) Prevention:  Goal: Patient will participate in Venous Thrombosis (VTE)/Deep Vein Thrombosis (DVT)Prevention Measures  Outcome: PROGRESSING AS EXPECTED  Pt on subQ heparin     Problem: Pain Management  Goal: Pain level will decrease to patient's comfort goal  Outcome: PROGRESSING AS EXPECTED  Denies pain

## 2018-06-19 NOTE — PROGRESS NOTES
Pt aox4. No visible signs of distress. VSS.  Tolerating medication regimen.  No signs or symptoms of hypoglycemia.  No complaints of pain.  Tolerating diet without n/v.  Colostomy patent and draining, pt provided care this shift with supervision of CNA. CNA reported no need to intervene. No leaks.  Bed locked and in low position.  Call light within reach and able to make all needs be known.  Will continue to monitor.

## 2018-06-19 NOTE — FACE TO FACE
Face to Face Supporting Documentation - Home Health    The encounter with this patient was in whole or in part the primary reason for home health admission.    Date of encounter:   Patient:                    MRN:                       YOB: 2020  Carlo Lew  [id number]  1950     Home health to see patient for:  Skilled Nursing care for assessment, interventions & education and Wound Care    Skilled need for:  Surgical Aftercare New colostomy for fistula    Skilled nursing interventions to include:  Wound Care    Homebound status evidenced by:  Needs the assistance of another person in order to leave the home. Leaving home requires a considerable and taxing effort. There is a normal inability to leave the home.    Community Physician to provide follow up care: No primary care provider on file.     Optional Interventions? No      I certify the face to face encounter for this home health care referral meets the CMS requirements and the encounter/clinical assessment with the patient was, in whole, or in part, for the medical condition(s) listed above, which is the primary reason for home health care. Based on my clinical findings: the service(s) are medically necessary, support the need for home health care, and the homebound criteria are met.  I certify that this patient has had a face to face encounter by myself.  Danica Benedict M.D. - NPI: [id number]

## 2018-06-19 NOTE — CONSULTS
Diabetes education: Met with patient to discuss difference between pre diabetes and type two diabetes management. Pt was admitted with blood sugar of 106 and Hg A1c of 6.2%.  Pt is currently on Lantus  23 units HS and Humalog insulin sliding scale coverage ac and hs. Current blood sugars are 82, 97, and 130.  Plan: Pt does not have insurance listed at this time. If pt does not have coverage Lantus would be costly for him. May consider decreasing dose of Lantus if fasting blood sugar remains low in am.  Plan: CDE will follow up tomorrow with SW as well as pt regarding insulin use and finger sticks.Please call [phone] if needs change.

## 2018-06-19 NOTE — CARE PLAN
Problem: Nutritional:  Goal: Achieve adequate nutritional intake  Diet will advance past NPO/clears and patient will consume >50% of meals   Outcome: MET Date Met: 06/19/18  Low Fiber; GI soft diet in place with great PO intake     Intervention: Monitor PO intake, weights, and laboratory values  Record percentage of all PO intake conusmed in ADLs to help monitor po adequacy      Intervention: Modify distribution, type or amount of food and nutrients within meals or snacks  RD will con't to monitor per dept policy

## 2018-06-19 NOTE — PROGRESS NOTES
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   Doing well. Neg N/V, Ostomy with + FLATUS and stool from ostomy, Pain controlled, Denies chest pain or SOB.  Ambulating. Tolerating PO.  Starting to care for ostomy. No leak overnight.  Will need ride home tomorrow    PE:  /86   Pulse 68   Temp 36.1 °C (97 °F)   Resp 18   Ht 1.753 m (5' 9\")   Wt 119.8 kg (264 lb 1.8 oz)   SpO2 93%   BMI 39.00 kg/m²     I/O:   Intake/Output Summary (Last 24 hours) at 06/16/18 1129  Last data filed at 06/16/18 0700   Gross per 24 hour   Intake             1125 ml   Output             1225 ml   Net             -100 ml     Review of Systems   Constitutional: Negative.  Negative for chills and fever.   Respiratory: Negative for shortness of breath.    Cardiovascular: Negative for chest pain.   Gastrointestinal: Negative for nausea and vomiting.        +flatus/stool   Genitourinary: Negative.      Physical Exam   Constitutional:  appears well-developed.   Neck: Neck supple.   Cardiovascular: Normal rate.    Pulmonary/Chest: Effort normal.   Abdominal: Soft.  exhibits no distension. Appropriate tenderness.   Incisions clean, dry, and intact  Stoma pink and viable  Musculoskeletal: Normal range of motion.   Neurological:  alert.   Skin:  Warm and dry.   Extremities: polo, no edema    Labs:  Recent Labs      06/17/18   0549  06/18/18   0135  06/19/18   0259   WBC  7.3  6.6  7.9   RBC  4.12*  4.05*  4.21*   HEMOGLOBIN  11.3*  11.1*  11.4*   HEMATOCRIT  34.3*  33.7*  35.6*   MCV  83.3  83.2  84.6   MCH  27.4  27.4  27.1   RDW  43.9  44.6  46.1   PLATELETCT  277  274  318   MPV  9.2  9.0  9.0   NEUTSPOLYS  74.80*  72.30*  61.20   LYMPHOCYTES  14.00*  13.90*  21.70*   MONOCYTES  8.70  7.80  6.50   EOSINOPHILS  1.90  5.10  9.00*   BASOPHILS  0.30  0.30  0.60     Recent Labs      06/17/18   0549  06/18/18   0135  06/19/18   0259   SODIUM  134*  137  134*   POTASSIUM  3.7  " 3.2*  3.9   CHLORIDE  104  105  104   CO2  22  24  23   GLUCOSE  97  92  98   BUN  9  7*  7*         A/P:   - Regular low residue diet as tolerated.  - IS Q One hour while awake  - Ambulate.  Out of bed for all meals please  - Pain controlled.  Cont PO pain medication  - Labs noted, recheck in am  - DVT propholaxis, ok to cont heparin, sequentials and ambulate  - Adequate urine output.  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  - needs social work to arrange for ride back home to [place] upon d/c      Discussed with Patient, RN and Dr. Imer Bell APKOKO  [facility]  [phone]

## 2018-06-19 NOTE — PROGRESS NOTES
Received bedside report from ROBERT Quinones  Pt is AAox4  SINGH  VSS  Denies pain  Denies SOB  -N/V; tolerating GI soft diet  +BS +Flatus. Pt has LUQ ostomy w/ good stool output.  Pt able to independently empty ostomy.  4x abdominal lap stabs; ROBERT w/ dermabond  Dressing to LLQ; CDI  Pt on IV zosyn   Pt up self w/ steady gait  POC discussed  Bed locked and in the lowest position  Call light w/in reach.  Hourly rounding in place

## 2018-06-19 NOTE — WOUND TEAM
" [facility]  Inpatient Services  New Ostomy Management & Teaching    HPI:  Reviewed  PMH: Reviewed   SH: Reviewed    Subjective: \"I did empty it twice last night\"    Objective: appliance intact     Ostomy type:  colostomy    Stoma location: LUQ   Stoma assessment:    Appearance: Red, edematous    Size: ~ 2\"    Protrusion:  slightly budded    Output:   Small loose brown    MC jxn  intact    Peristomal skin: intact     Ostomy Appliance (type and size):  2\" one piece convex with paste ring and belt    Interventions:  Met with patient and catalog marked for current supplies.  Instructed him to follow up with [facility] regarding ordering his supplies.   also calling them for home health follow up.  Patient traced and cut barrier with minimal assistance.  He removed backing and applied paste ring with some assistance.  He removed old appliance and cleansed peristomal skin and I applied strip thin hydrocolloid over de nuded area due to barrier sticking.  He applied barrier with some assistance and then applied tape edges and closed end of pouch applying belt.   Pt education: Questions and concerns addressed    Evaluation: Patient did very well today with self care for the first time.  He needs prompting and assistance with barrier placement due to size     Plan: Ostomy RN to change with patient tomorrow to provide another opportunity for hands on ostomy care     Anticipated discharge needs:  Has everything for discharge     "

## 2018-06-19 NOTE — DIETARY
Nutrition Services     Consult received for DM diet education     Provided patient with general, healthy nutrition diet education and handout from the Academy of [employer] diet manual. Discussed how the pt is borderline diabetic with a HgA1c of 6.2% and stressed the importance of losing weight and controlling his blood sugars as uncontrolled diabetes can lead to various complications; pt expressed his understanding.  Informed pt on how to contact nutrition services if any questions or concerns arise.   Also provided pt with outpatient nutrition services phone number as well.     Please re-consult nutrition services as warranted.

## 2018-06-19 NOTE — PROGRESS NOTES
Diabetes education: Met with patient this afternoon. Please see consult note.  Plan: CDE will follow up tomorrow with SW as well as pt regarding insulin use and finger sticks.Please call [phone] if needs change.

## 2018-06-20 LAB
GLUCOSE BLD-MCNC: 122 MG/DL (ref 65–99)
GLUCOSE BLD-MCNC: 132 MG/DL (ref 65–99)
GLUCOSE BLD-MCNC: 160 MG/DL (ref 65–99)
GLUCOSE BLD-MCNC: 91 MG/DL (ref 65–99)

## 2018-06-20 PROCEDURE — A9270 NON-COVERED ITEM OR SERVICE: HCPCS | Performed by: NURSE PRACTITIONER

## 2018-06-20 PROCEDURE — 99232 SBSQ HOSP IP/OBS MODERATE 35: CPT | Performed by: INTERNAL MEDICINE

## 2018-06-20 PROCEDURE — 700111 HCHG RX REV CODE 636 W/ 250 OVERRIDE (IP): Performed by: HOSPITALIST

## 2018-06-20 PROCEDURE — 700102 HCHG RX REV CODE 250 W/ 637 OVERRIDE(OP): Performed by: NURSE PRACTITIONER

## 2018-06-20 PROCEDURE — 700102 HCHG RX REV CODE 250 W/ 637 OVERRIDE(OP): Performed by: INTERNAL MEDICINE

## 2018-06-20 PROCEDURE — 82962 GLUCOSE BLOOD TEST: CPT | Mod: 91

## 2018-06-20 PROCEDURE — 770006 HCHG ROOM/CARE - MED/SURG/GYN SEMI*

## 2018-06-20 PROCEDURE — 700111 HCHG RX REV CODE 636 W/ 250 OVERRIDE (IP): Performed by: NURSE PRACTITIONER

## 2018-06-20 PROCEDURE — 700105 HCHG RX REV CODE 258: Performed by: HOSPITALIST

## 2018-06-20 PROCEDURE — A9270 NON-COVERED ITEM OR SERVICE: HCPCS | Performed by: INTERNAL MEDICINE

## 2018-06-20 RX ADMIN — HEPARIN SODIUM 5000 UNITS: 5000 INJECTION, SOLUTION INTRAVENOUS; SUBCUTANEOUS at 08:30

## 2018-06-20 RX ADMIN — HEPARIN SODIUM 5000 UNITS: 5000 INJECTION, SOLUTION INTRAVENOUS; SUBCUTANEOUS at 16:48

## 2018-06-20 RX ADMIN — LISINOPRIL 10 MG: 10 TABLET ORAL at 08:29

## 2018-06-20 RX ADMIN — PIPERACILLIN SODIUM AND TAZOBACTAM SODIUM 3.38 G: 3; .375 INJECTION, POWDER, FOR SOLUTION INTRAVENOUS at 06:02

## 2018-06-20 RX ADMIN — ACETAMINOPHEN 1000 MG: 500 TABLET ORAL at 06:02

## 2018-06-20 RX ADMIN — PIPERACILLIN SODIUM AND TAZOBACTAM SODIUM 3.38 G: 3; .375 INJECTION, POWDER, FOR SOLUTION INTRAVENOUS at 21:26

## 2018-06-20 RX ADMIN — INSULIN GLARGINE 23 UNITS: 100 INJECTION, SOLUTION SUBCUTANEOUS at 21:29

## 2018-06-20 RX ADMIN — ACETAMINOPHEN 1000 MG: 500 TABLET ORAL at 01:05

## 2018-06-20 RX ADMIN — HEPARIN SODIUM 5000 UNITS: 5000 INJECTION, SOLUTION INTRAVENOUS; SUBCUTANEOUS at 01:05

## 2018-06-20 RX ADMIN — PIPERACILLIN SODIUM AND TAZOBACTAM SODIUM 3.38 G: 3; .375 INJECTION, POWDER, FOR SOLUTION INTRAVENOUS at 11:31

## 2018-06-20 ASSESSMENT — ENCOUNTER SYMPTOMS
PALPITATIONS: 0
WHEEZING: 0
WEAKNESS: 0
DEPRESSION: 0
SPUTUM PRODUCTION: 0
FEVER: 0
COUGH: 0
SHORTNESS OF BREATH: 0
DIARRHEA: 0
ABDOMINAL PAIN: 1
VOMITING: 0
SORE THROAT: 0
MYALGIAS: 0
NAUSEA: 0
BLOOD IN STOOL: 0
CHILLS: 0
DIZZINESS: 0
CONSTIPATION: 0

## 2018-06-20 ASSESSMENT — PAIN SCALES - GENERAL: PAINLEVEL_OUTOF10: 0

## 2018-06-20 NOTE — PROGRESS NOTES
[facility] Hospitalist Progress Note    Date of Service: 2018    Chief Complaint  67 y.o. 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 He was taken to the operating room by Dr. Wesly Willams on 6/15, has diverting ostomy. Ostomy now functioning well. Has residual wound in left lower quadrant.    Interval Problem Update  Discussed with wound and ostomy RN  Patient is unable to care for left lower quadrant wound due to obesity, pannus.  Ostomy care going well however.  He [facility] very near the [facility].  Discussed with case management    Consultants/Specialty  Gen. Surgery      Disposition  Home        Review of Systems   Constitutional: Negative for chills, fever and malaise/fatigue.   HENT: Negative for sore throat.    Respiratory: Negative for cough, sputum production, shortness of breath and wheezing.    Cardiovascular: Negative for chest pain and palpitations.   Gastrointestinal: Positive for abdominal pain (only near left lower quadrant area minimal at ostomy). Negative for blood in stool, constipation, diarrhea, nausea and vomiting.   Genitourinary: Negative for dysuria.   Musculoskeletal: Negative for myalgias.   Skin: Negative for itching and rash.   Neurological: Negative for dizziness and weakness.   Psychiatric/Behavioral: Negative for depression.      Physical Exam  Laboratory/Imaging   Hemodynamics  Temp (24hrs), Av.6 °C (97.9 °F), Min:36.1 °C (97 °F), Max:36.9 °C (98.5 °F)   Temperature: 36.9 °C (98.5 °F)  Pulse  Av  Min: 59  Max: 86    Blood Pressure : 151/90      Respiratory      Respiration: 16, Pulse Oximetry: 95 %        RUL Breath Sounds: Clear, RML Breath Sounds: Clear, RLL Breath Sounds: Diminished, NII Breath Sounds: Clear, LLL Breath Sounds: Diminished    Fluids    Intake/Output Summary (Last 24 hours) at 18  1759  Last data filed at 06/19/18 1540   Gross per 24 hour   Intake              840 ml   Output              300 ml   Net              540 ml       Nutrition  Orders Placed This Encounter   Procedures   • DIET ORDER     Standing Status:   Standing     Number of Occurrences:   1     Order Specific Question:   Diet:     Answer:   Low Fiber(GI Soft) [2]     Order Specific Question:   Miscellaneous modifications:     Answer:   6 Small Meals [4]     Physical Exam   Constitutional: He is oriented to person, place, and time. He appears well-developed and well-nourished. No distress.   Obese, nontoxic   HENT:   Head: Normocephalic and atraumatic.   Mouth/Throat: Oropharynx is clear and moist.   Eyes: Conjunctivae and EOM are normal. Pupils are equal, round, and reactive to light. Right eye exhibits no discharge. Left eye exhibits no discharge.   Neck: Neck supple.   Cardiovascular: Normal rate and regular rhythm.    Pulmonary/Chest: Effort normal and breath sounds normal.   Abdominal: Bowel sounds are normal. He exhibits distension (due to obesity, ostomy left mid abdomen, wound dressing left lower quadrant under pannus). He exhibits no mass. There is no tenderness. There is no rebound and no guarding.   Musculoskeletal: He exhibits no edema or tenderness.   Neurological: He is alert and oriented to person, place, and time. No cranial nerve deficit.   Skin: Skin is warm and dry. He is not diaphoretic.   Psychiatric: He has a normal mood and affect.   Nursing note and vitals reviewed.      Recent Labs      06/17/18   0549  06/18/18   0135  06/19/18   0259   WBC  7.3  6.6  7.9   RBC  4.12*  4.05*  4.21*   HEMOGLOBIN  11.3*  11.1*  11.4*   HEMATOCRIT  34.3*  33.7*  35.6*   MCV  83.3  83.2  84.6   MCH  27.4  27.4  27.1   MCHC  32.9*  32.9*  32.0*   RDW  43.9  44.6  46.1   PLATELETCT  277  274  318   MPV  9.2  9.0  9.0     Recent Labs      06/17/18   0549  06/18/18   0135  06/19/18   0259   SODIUM  134*  137  134*   POTASSIUM   3.7  3.2*  3.9   CHLORIDE  104  105  104   CO2  22  24  23   GLUCOSE  97  92  98   BUN  9  7*  7*   CREATININE  0.82  0.65  0.70   CALCIUM  8.4*  8.0*  8.7                      Assessment/Plan     * Abdominal wall fistula   Assessment & Plan    S/p diverting colostomy.    Pain controlled  Remains on IV antibiotics, will discharge when cleared by surgery  Post discharge planning in progress        Hyperglycemia   Assessment & Plan    Borderline diabetic-A1c 6.2-has been seen by diabetes educator  Primary care follow-up-future metformin        Hyponatremia   Assessment & Plan    Mild, persistent        Abscess of abdominal cavity (HCC)   Assessment & Plan    resolved        HTN (hypertension)   Assessment & Plan    Resume lisinopril        Renal cell adenocarcinoma (HCC)   Assessment & Plan    Hx in past with hx of nephrectomy        H/O unilateral nephrectomy   Assessment & Plan    Motrin discontinued          Diverticulitis   Assessment & Plan    Recurrent.  On antibiotic therapy for the same.  Here with abscess and fistula formation  Iv abx, s/p surgery intervention.  Will need wound care post discharge-ostomy care and monitoring-setting up through [facility]          BMI 50.0-59.9, adult (HCC)   Assessment & Plan    BMI 57.64  Consistent carbohydrate diet post discharge          Quality-Core Measures   Reviewed items::  Labs reviewed and Medications reviewed  Dyer catheter::  No Dyer  DVT prophylaxis pharmacological::  Heparin  Antibiotics:  Treating active infection/contamination beyond 24 hours perioperative coverage  Assessed for rehabilitation services:  Patient returned to prior level of function, rehabilitation not indicated at this time

## 2018-06-20 NOTE — DISCHARGE PLANNING
Anticipated Discharge Disposition: Home with HHC, Transportation Assistance and Ostomy Supplies.    Action: Spoke with Alice Mcdowell with [facility].  Per Alice, transportation from [place] to [place] is schedule for tomorrow at 1600.  Alice requested that LSW contacts their  Chong with any changes in discharge date, Chong's phone number is [phone].    LSW spoke with Isidra with the Medical Team at the [facility].  Isidra reports, patient will be evaluated by Dr. Sarmiento, his PCP Friday June 22nd at 1:00pm.      Isidra requested that the HHC Referral is sent to [facility], their preferred provider, choice form provided to [facility].    Isidra requested that patient is provided with 2-3 days of Ostomy Supplies and more will be provided Friday by his PCP.    Barriers to Discharge: HHC Acceptance.    Plan: Home with HHC, pending acceptance.

## 2018-06-20 NOTE — WOUND TEAM
" [facility]  Inpatient Services  New Ostomy Management & Teaching    HPI:  Reviewed  PMH: Reviewed   SH: Reviewed    Subjective: \"I guess I'll get better the more I do this\"    Objective: appliance intact     Ostomy type:  colostomy    Stoma location: LUQ   Stoma assessment:    Appearance: Red, edematous    Size: ~ 2\"    Protrusion:  slightly budded    Output:   Moderate soft brown    MC jxn  intact    Peristomal skin: intact     Ostomy Appliance (type and size):  2\" one piece convex with paste ring and belt    Interventions:  Met with patient and he traced and cut barrier independently.  Reminded to remove backing before applying paste ring which he did.  He removed old appliance with some assistance and cleansed peristomal skin with some assistance removing old paste ring.  Thin hydrocolloid applied to anna wound where tape stripping occurred.  He held abdomen and I placed appliance on peristomal skin.  Patient then rubbed barrier and applied tape edge, closing end of pouch and attaching belt which he needed prompting to do.  Lower abdominal dressing changed noting that no tract.     Pt education: Questions and concerns addressed    Evaluation: Patient continues to become more comfortable with care but will need continued prompting for ostomy care.     Plan: Ostomy RN to follow up tomorrow if still here     Anticipated discharge needs:  Has everything for discharge     "

## 2018-06-20 NOTE — DISCHARGE PLANNING
Received Choice form at [id number]  Agency/Facility Name: [facility]   Referral sent per Choice form @ 2689

## 2018-06-20 NOTE — PROGRESS NOTES
Pt aox4. No visibile signs of distress. VSS.  Tolerating medication regimen.  Colostomy bag patent, no change required.  Hourly rounding in place.  Call light within reach and able to make all needs be known. Will continue to monitor.

## 2018-06-20 NOTE — DISCHARGE PLANNING
Anticipated Discharge Disposition: Home with HHC and Transportation Assistance.    Action: LSW met with patient at bedside to discuss discharge planning needs.  Patient reports he follows up a the [facility] in [place], [place] [phone].  LSW spoke with Saida at [facility] who instructed this LSW to leave a message for [facility] requesting their assistance with transportation to [place].  Saida stated the soonest patient can be transported is tomorrow 6/21/18.  Saida requested LSW calls back today after 1300 and speaks with their Medical Department in reference to HHC and Ostomy Supplies.    Barriers to Discharge: Transportation, HHC, and Ostomy Supplies.    Plan: Home with HHC, Ostomy Supplies and Transportation Assistance, pending arrangements.

## 2018-06-21 VITALS
SYSTOLIC BLOOD PRESSURE: 116 MMHG | HEIGHT: 69 IN | BODY MASS INDEX: 38.4 KG/M2 | OXYGEN SATURATION: 94 % | DIASTOLIC BLOOD PRESSURE: 78 MMHG | WEIGHT: 259.26 LBS | RESPIRATION RATE: 16 BRPM | TEMPERATURE: 98.8 F | HEART RATE: 90 BPM

## 2018-06-21 LAB
GLUCOSE BLD-MCNC: 104 MG/DL (ref 65–99)
GLUCOSE BLD-MCNC: 115 MG/DL (ref 65–99)

## 2018-06-21 PROCEDURE — A9270 NON-COVERED ITEM OR SERVICE: HCPCS | Performed by: INTERNAL MEDICINE

## 2018-06-21 PROCEDURE — 700102 HCHG RX REV CODE 250 W/ 637 OVERRIDE(OP): Performed by: INTERNAL MEDICINE

## 2018-06-21 PROCEDURE — 700105 HCHG RX REV CODE 258: Performed by: HOSPITALIST

## 2018-06-21 PROCEDURE — A9270 NON-COVERED ITEM OR SERVICE: HCPCS | Performed by: NURSE PRACTITIONER

## 2018-06-21 PROCEDURE — 700111 HCHG RX REV CODE 636 W/ 250 OVERRIDE (IP): Performed by: NURSE PRACTITIONER

## 2018-06-21 PROCEDURE — 700111 HCHG RX REV CODE 636 W/ 250 OVERRIDE (IP): Performed by: HOSPITALIST

## 2018-06-21 PROCEDURE — 82962 GLUCOSE BLOOD TEST: CPT

## 2018-06-21 PROCEDURE — 99239 HOSP IP/OBS DSCHRG MGMT >30: CPT | Performed by: INTERNAL MEDICINE

## 2018-06-21 PROCEDURE — 700102 HCHG RX REV CODE 250 W/ 637 OVERRIDE(OP): Performed by: NURSE PRACTITIONER

## 2018-06-21 RX ORDER — METFORMIN HYDROCHLORIDE 500 MG/1
500 TABLET, EXTENDED RELEASE ORAL DAILY
Qty: 30 TAB | Refills: 12 | Status: SHIPPED | OUTPATIENT
Start: 2018-06-21

## 2018-06-21 RX ORDER — LISINOPRIL 10 MG/1
10 TABLET ORAL DAILY
Qty: 30 TAB | Refills: 12 | Status: SHIPPED | OUTPATIENT
Start: 2018-06-22

## 2018-06-21 RX ORDER — AMOXICILLIN AND CLAVULANATE POTASSIUM 875; 125 MG/1; MG/1
1 TABLET, FILM COATED ORAL EVERY 12 HOURS
Qty: 20 TAB | Refills: 0 | Status: SHIPPED | OUTPATIENT
Start: 2018-06-21 | End: 2019-01-03

## 2018-06-21 RX ORDER — AMOXICILLIN AND CLAVULANATE POTASSIUM 875; 125 MG/1; MG/1
1 TABLET, FILM COATED ORAL EVERY 12 HOURS
Status: DISCONTINUED | OUTPATIENT
Start: 2018-06-21 | End: 2018-06-21 | Stop reason: HOSPADM

## 2018-06-21 RX ADMIN — AMOXICILLIN AND CLAVULANATE POTASSIUM 1 TABLET: 875; 125 TABLET, FILM COATED ORAL at 08:26

## 2018-06-21 RX ADMIN — PIPERACILLIN SODIUM AND TAZOBACTAM SODIUM 3.38 G: 3; .375 INJECTION, POWDER, FOR SOLUTION INTRAVENOUS at 06:09

## 2018-06-21 RX ADMIN — HEPARIN SODIUM 5000 UNITS: 5000 INJECTION, SOLUTION INTRAVENOUS; SUBCUTANEOUS at 08:26

## 2018-06-21 RX ADMIN — LISINOPRIL 10 MG: 10 TABLET ORAL at 08:25

## 2018-06-21 RX ADMIN — HEPARIN SODIUM 5000 UNITS: 5000 INJECTION, SOLUTION INTRAVENOUS; SUBCUTANEOUS at 00:08

## 2018-06-21 NOTE — DISCHARGE PLANNING
Agency[facility]ility Name: [facility]   Spoke To: Latasha   Outcome: Facility needs name of PCP and Patient Phone number     LSW Heather has been notified.

## 2018-06-21 NOTE — PROGRESS NOTES
Pt A&O x's 4, VSS, denies any pain at this time. Pt has +void, +loose ostomy putput, +flatus. LLQ wound with DIP, CDI, will reinforce or change if needed. IVF's and abx stopped. POC discussed with pt, all questions and concerns have been addressed at this time, bed in locked/lowest position, call light, urinal and personal items within reach. Will continue to discharge.

## 2018-06-21 NOTE — DISCHARGE PLANNING
Agency[facility]Facility Name: [facility]   Spoke To: Nory   Outcome: [facility] gave Nory patients phone number [phone], and name of PCP  from the [facility] @ . [phone].     [facility] will follow up with Nory with patients discharge summary once completed.

## 2018-06-21 NOTE — PROGRESS NOTES
[facility] Hospitalist Progress Note    Date of Service: 2018    Chief Complaint  67 y.o. 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 He was taken to the operating room by Dr. Wesly Willams on 6/15, has diverting ostomy. Ostomy now functioning well. Has residual wound in left lower quadrant.    Interval Problem Update  Discussed with social work  She is coordinating with [facility] for appropriate follow-up & transport  He should to be discharged tomorrow  Patient updated on care plan    Consultants/Specialty  Gen. Surgery      Disposition  Home        Review of Systems   Constitutional: Negative for chills, fever and malaise/fatigue.   HENT: Negative for sore throat.    Respiratory: Negative for cough, sputum production, shortness of breath and wheezing.    Cardiovascular: Negative for chest pain and palpitations.   Gastrointestinal: Positive for abdominal pain (minimal left lower quadrant and at ostomy only when manipulated.). Negative for blood in stool, constipation, diarrhea, nausea and vomiting.   Genitourinary: Negative for dysuria.   Musculoskeletal: Negative for myalgias.   Skin: Negative for itching and rash.   Neurological: Negative for dizziness and weakness.   Psychiatric/Behavioral: Negative for depression.      Physical Exam  Laboratory/Imaging   Hemodynamics  Temp (24hrs), Av.7 °C (98.1 °F), Min:36.5 °C (97.7 °F), Max:37 °C (98.6 °F)   Temperature: 37 °C (98.6 °F)  Pulse  Av.7  Min: 59  Max: 96    Blood Pressure : 127/91      Respiratory      Respiration: 18, Pulse Oximetry: 91 %        RUL Breath Sounds: Clear, RML Breath Sounds: Clear, RLL Breath Sounds: Diminished, NII Breath Sounds: Clear, LLL Breath Sounds: Diminished    Fluids    Intake/Output Summary (Last 24 hours) at 18 [id number]  Last data filed at 18  1700   Gross per 24 hour   Intake              580 ml   Output              700 ml   Net             -120 ml       Nutrition  Orders Placed This Encounter   Procedures   • DIET ORDER     Standing Status:   Standing     Number of Occurrences:   1     Order Specific Question:   Diet:     Answer:   Low Fiber(GI Soft) [2]     Order Specific Question:   Diet:     Answer:   Diabetic [3]     Order Specific Question:   Miscellaneous modifications:     Answer:   6 Small Meals [4]     Physical Exam   Constitutional: He is oriented to person, place, and time. He appears well-developed and well-nourished. No distress.   Obese, nontoxic   HENT:   Head: Normocephalic and atraumatic.   Mouth/Throat: Oropharynx is clear and moist.   Eyes: Conjunctivae and EOM are normal. Pupils are equal, round, and reactive to light. Right eye exhibits no discharge. Left eye exhibits no discharge.   Neck: Neck supple.   Cardiovascular: Normal rate and regular rhythm.    Pulmonary/Chest: Effort normal and breath sounds normal.   Abdominal: Soft. Bowel sounds are normal. He exhibits distension (examined supine today less distended). He exhibits no mass. There is no tenderness. There is no rebound and no guarding.   Dark brown liquid stool in ostomy  Left lower quadrant dressing intact   Musculoskeletal: He exhibits no edema or tenderness.   Neurological: He is alert and oriented to person, place, and time. No cranial nerve deficit.   Skin: Skin is warm and dry. He is not diaphoretic.   Psychiatric: He has a normal mood and affect.   Nursing note and vitals reviewed.      Recent Labs      06/18/18 0135 06/19/18   0259   WBC  6.6  7.9   RBC  4.05*  4.21*   HEMOGLOBIN  11.1*  11.4*   HEMATOCRIT  33.7*  35.6*   MCV  83.2  84.6   MCH  27.4  27.1   MCHC  32.9*  32.0*   RDW  44.6  46.1   PLATELETCT  274  318   MPV  9.0  9.0     Recent Labs      06/18/18   0135  06/19/18   0259   SODIUM  137  134*   POTASSIUM  3.2*  3.9   CHLORIDE  105  104   CO2  24  23    GLUCOSE  92  98   BUN  7*  7*   CREATININE  0.65  0.70   CALCIUM  8.0*  8.7                      Assessment/Plan     * Abdominal wall fistula   Assessment & Plan    S/p diverting colostomy.    Pain controlled  Remains on IV antibiotics- discharge on by mouth or as recommended by surgery  Anticipate discharge tomorrow afternoon with follow-up in [facility] the subsequent day, [facility] also to follow        Hyperglycemia   Assessment & Plan    Borderline diabetic-A1c 6.2-has been seen by diabetes educator  Primary care follow-up-future metformin-sugars here are acceptable        Hyponatremia   Assessment & Plan    Mild, persistent        Abscess of abdominal cavity (HCC)   Assessment & Plan    resolved        HTN (hypertension)   Assessment & Plan    Resumed lisinopril        Renal cell adenocarcinoma (HCC)   Assessment & Plan    Hx in past with hx of nephrectomy        H/O unilateral nephrectomy   Assessment & Plan    Motrin discontinued          Diverticulitis   Assessment & Plan    Recurrent.  On antibiotic therapy for the same.  Here with abscess and fistula formation  Iv abx, s/p surgery intervention.  Will need wound care post discharge-ostomy care and monitoring-setting up through  health services          BMI 38.0-38.9,adult   Assessment & Plan    BMI 38.24  Consistent carbohydrate diet post discharge          Quality-Core Measures   Reviewed items::  Labs reviewed and Medications reviewed  Dyer catheter::  No Dyer  DVT prophylaxis pharmacological::  Heparin  Antibiotics:  Treating active infection/contamination beyond 24 hours perioperative coverage  Assessed for rehabilitation services:  Patient returned to prior level of function, rehabilitation not indicated at this time

## 2018-06-21 NOTE — DISCHARGE PLANNING
Anticipated Discharge Disposition: [facility]    Action: LSW provided information related tho PCP and patient's cell phone number to [facility].  LSW requested [facility] verifies [facility] Acceptance as patient is medically clear for discharge home today.    Barriers to Discharge: [facility] Acceptance.    Plan: Home with HHC, pending [facility] Acceptance.

## 2018-06-21 NOTE — PROGRESS NOTES
Pt aox4. No visible signs of distress. VSS.   Tolerating medication regimen without any signs or symptoms of adverse reaction.  No signs or symptoms of hypoglycemia. Tolerating diet. No n/v.  Pt on room air, no complaints of SOB.  Colostomy in place, patent and draining.  No complaints of pain.  Bed locked and in low position.  Call light within reach and able to make all needs be known.   Will continue to monitor.

## 2018-06-21 NOTE — PROGRESS NOTES
"Diabetes education: Met with Carlo, who does not have Medicare \"part D\" coverage but is followed by [facility] ([phone]). CDE called [facility] and spoke with pharmacy who states they cover True Track meter ( CDE does not have this meter), Levemir vials and pens (will need vials as the small dose of Lantus he is taking). They also said they cannot fill from an outside source unless the patient was sent to us from them ( no record of that). CDE attempted to make an appointment for the patient but \"medical\" was out to lunch. Message left to call back.  Pt will need to have his pm dose of Lantus either given early or sent with him as he will not be able to got the [facility] until tomorrow at the earliest ( they close at 5 and he is leaving at 4).   CDE will teach him to draw up and inject and do finger sticks with a True Metrix meter ( he will need to get the True track from [facility]).  Will the patient be discharged on insulin ?? He was on a regular diet until changed by nursing at 4:47 yesterday. He had pancakes with regular syrup etc for breakfast yesterday and his ac lunch finger stick was only 160 ( the only time he received fast acting insulin: 2 units) other wise he has only had Lantus 23 units at [facility] with blood sugars of 115, 104, 132, 122, 160, 91, and 119.   Plan: If pt to go home on lantus it needs to be vials and 3/10 cc syringes (6 mm box of 100). He will need to either have his Lantus dose given before he leaves at 4 or sent with him to give at hs as he will not be able to  prescriptions for [facility] until after his visit with them tomorrow ( if he can get an appointment). Please call [phone] If needs change.    Spoke with Heather BOOTH and pt has an appointment with [facility] tomorrow at 1pm  Spoke with Dr. Benedict. Pt will not be going home insulin and he can wait for his appointment tomorrow to get a meter as he is overwhelmed with ostomy information .  Discussed this with patient as well as brief review " of what and how much to eat per his request.

## 2018-06-21 NOTE — DISCHARGE INSTRUCTIONS
Discharge Instructions    Discharged to home by car with relative. Discharged via wheelchair, hospital escort: Refused.  Special equipment needed: Not Applicable    Be sure to schedule a follow-up appointment with your primary care doctor or any specialists as instructed.     Discharge Plan:   Influenza Vaccine Indication: (P) Patient Refuses    I understand that a diet low in cholesterol, fat, and sodium is recommended for good health. Unless I have been given specific instructions below for another diet, I accept this instruction as my diet prescription.   Other diet: Low fiber      Special Instructions: None    · Is patient discharged on Warfarin / Coumadin?   No     Depression / Suicide Risk    As you are discharged from this [facility] facility, it is important to learn how to keep safe from harming yourself.    Recognize the warning signs:  · Abrupt changes in personality, positive or negative- including increase in energy   · Giving away possessions  · Change in eating patterns- significant weight changes-  positive or negative  · Change in sleeping patterns- unable to sleep or sleeping all the time   · Unwillingness or inability to communicate  · Depression  · Unusual sadness, discouragement and loneliness  · Talk of wanting to die  · Neglect of personal appearance   · Rebelliousness- reckless behavior  · Withdrawal from people/activities they love  · Confusion- inability to concentrate     If you or a loved one observes any of these behaviors or has concerns about self-harm, here's what you can do:  · Talk about it- your feelings and reasons for harming yourself  · Remove any means that you might use to hurt yourself (examples: pills, rope, extension cords, firearm)  · Get professional help from the community (Mental Health, Substance Abuse, psychological counseling)  · Do not be alone:Call your Safe Contact- someone whom you trust who will be there for you.  · Call your local CRISIS HOTLINE 088-0193 or  [phone]  · Call your local Children's Mobile Crisis Response Team [place] (799) 483-9102 or www.[employer]  · Call the toll free National Suicide Prevention Hotlines   · National Suicide Prevention Lifeline 603-437-XEYL (2286)  · [facility] Hope Line Network 800-SUICIDE (020-1149)      Colostomy, Adult  Introduction  A colostomy is a surgical procedure that involves attaching part of the colon to the front of the abdomen (abdominal wall). The colon is the last part of the digestive tract. It is where water is absorbed from digested food to form stool (feces). A colostomy is done to redirect stool through an opening (stoma) in the abdominal wall.  You may need this surgery if you have a medical condition that prevents stool from leaving your body through the usual opening (rectum). A bag will be attached to the stoma on the outside of your body. This bag will collect the stool and waste that is redirected through the stoma. A colostomy may be temporary or permanent.  Tell a health care provider about:  · Any allergies you have.  · All medicines you are taking, including vitamins, herbs, eye drops, creams, and over-the-counter medicines.  · Any problems you or family members have had with anesthetic medicines.  · Any blood disorders you have.  · Any surgeries you have had.  · Any medical conditions you have.  · Whether you are pregnant or may be pregnant.  What are the risks?  Generally, this is a safe procedure. However, problems may occur, including:  · Infection.  · Bleeding.  · Allergic reactions to medicines.  · Damage to other structures or organs.  · Leaking of stool inside the abdomen.  · Formation of scar tissue that causes a blockage.  What happens before the procedure?  · Follow instructions from your health care provider about eating or drinking restrictions.  · Ask your health care provider about:  ¨ Changing or stopping your regular medicines. This is especially important if you are taking  diabetes medicines or blood thinners.  ¨ Taking medicines such as aspirin and ibuprofen. These medicines can thin your blood. Do not take these medicines before your procedure if your health care provider instructs you not to.  · Do not use any tobacco products, such as cigarettes, chewing tobacco, and e-cigarettes. If you need help quitting, ask your health care provider.  · Plan to have someone take you home after the procedure.  · You may have an exam or testing.  · Ask your health care provider how your surgical site will be marked or identified.  · You may be given antibiotic medicine to help prevent infection.  What happens during the procedure?  · To reduce your risk of infection:  ¨ Your health care team will wash or sanitize their hands.  ¨ Your skin will be washed with soap.  · An IV tube will be inserted into one of your veins.  · A drainage tube may be passed through your nose and into your stomach (NG tube, or nasogastric tube).  · You may be given a medicine to help you relax (sedative).  · You will be given a medicine to make you fall asleep (general anesthetic).  · An incision will be made in the front of your abdomen.  · The muscles under the skin will be divided or .  · The next steps will vary depending on the type of colostomy. There are two main types:  ¨ End colostomy. Part of the colon will be removed, or the colon will be divided into two separate parts. The end of the colon that is attached to the upper part of the digestive tract will be attached to the wall of the abdomen, creating a stoma. The other end will be closed off.  ¨ Loop colostomy. A part of the colon will be pulled through the incision in the abdomen. Two openings will be made in the side of the colon. The colon will be attached to the skin at these openings, creating the stoma.  · Stitches (sutures) will be used to create the stoma and close the incision.  · A colostomy bag will be placed over the stoma to collect  stool and mucus.  The procedure may vary among health care providers and hospitals.  What happens after the procedure?  · Your blood pressure, heart rate, breathing rate, and blood oxygen level will be monitored often until the medicines you were given have worn off.  · You will be given pain medicine as needed.  · You will receive fluids and nutrition through an IV tube.  · As soon as you are eating well and passing stool through the colostomy, the nasogastric tube and the IV tube may be removed.  · Do not drive for 24 hours if you received a sedative.  This information is not intended to replace advice given to you by your health care provider. Make sure you discuss any questions you have with your health care provider.  Document Released: 05/09/2012 Document Revised: 05/25/2017 Document Reviewed: 08/30/2016  © 2017 Elsevier      Type 2 Diabetes Mellitus, Self Care, Adult  When you have type 2 diabetes (type 2 diabetes mellitus), you must keep your blood sugar (glucose) under control. You can do this with:  · Nutrition.  · Exercise.  · Lifestyle changes.  · Medicines or insulin, if needed.  · Support from your doctors and others.  How do I manage my blood sugar?  · Check your blood sugar level every day, as often as told.  · Call your doctor if your blood sugar is above your goal numbers for 2 tests in a row.  · Have your A1c (hemoglobin A1c) level checked at least two times a year. Have it checked more often if your doctor tells you to.  Your doctor will set treatment goals for you. Generally, you should have these blood sugar levels:  · Before meals (preprandial):  mg/dL (4.4-7.2 mmol/L).  · After meals (postprandial): lower than 180 mg/dL (10 mmol/L).  · A1c level: less than 7%.  What do I need to know about high blood sugar?  High blood sugar is called hyperglycemia. Know the signs of high blood sugar. Signs may include:  · Feeling:  ¨ Thirsty.  ¨ Hungry.  ¨ Very tired.  · Needing to pee (urinate) more  than usual.  · Blurry vision.  What do I need to know about low blood sugar?  Low blood sugar is called hypoglycemia. This is when blood sugar is at or below 70 mg/dL (3.9 mmol/L). Symptoms may include:  · Feeling:  ¨ Hungry.  ¨ Worried or nervous (anxious).  ¨ Sweaty and clammy.  ¨ Confused.  ¨ Dizzy.  ¨ Sleepy.  ¨ Sick to your stomach (nauseous).  · Having:  ¨ A fast heartbeat (palpitations).  ¨ A headache.  ¨ A change in your vision.  ¨ Jerky movements that you cannot control (seizure).  ¨ Nightmares.  ¨ Tingling or no feeling (numbness) around the mouth, lips, or tongue.  · Having trouble with:  ¨ Talking.  ¨ Paying attention (concentrating).  ¨ Moving (coordination).  ¨ Sleeping.  · Shaking.  · Passing out (fainting).  · Getting upset easily (irritability).  Treating low blood sugar   To treat low blood sugar, eat or drink something sugary right away. If you can think clearly and swallow safely, follow the 15:15 rule:  · Take 15 grams of a fast-acting carb (carbohydrate). Some fast-acting carbs are:  ¨ 1 tube of glucose gel.  ¨ 3 sugar tablets (glucose pills).  ¨ 6-8 pieces of hard candy.  ¨ 4 oz (120 mL) of fruit juice.  ¨ 4 oz (120 mL) regular (not diet) soda.  · Check your blood sugar 15 minutes after you take the carb.  · If your blood sugar is still at or below 70 mg/dL (3.9 mmol/L), take 15 grams of a carb again.  · If your blood sugar does not go above 70 mg/dL (3.9 mmol/L) after 3 tries, get help right away.  · After your blood sugar goes back to normal, eat a meal or a snack within 1 hour.  Treating very low blood sugar   If your blood sugar is at or below 54 mg/dL (3 mmol/L), you have very low blood sugar (severe hypoglycemia). This is an emergency. Do not wait to see if the symptoms will go away. Get medical help right away. Call your local emergency services (911 in the [place] Do not drive yourself to the hospital.  If you have very low blood sugar and you cannot eat or drink, you may need a  glucagon shot (injection). A family member or friend should learn how to check your blood sugar and how to give you a glucagon shot. Ask your doctor if you need to have a glucagon shot kit at home.  What else is important to manage my diabetes?  Medicine   Follow these instructions about insulin and diabetes medicines:  · Take them as told by your doctor.  · Adjust them as told by your doctor.  · Do not run out of them.  Having diabetes can raise your risk for other long-term conditions. These include heart or kidney disease. Your doctor may prescribe medicines to help prevent problems from diabetes.  Food   · Make healthy food choices. These include:  ¨ Chicken, fish, egg whites, and beans.  ¨ Oats, whole wheat, bulgur, brown rice, quinoa, and millet.  ¨ Fresh fruits and vegetables.  ¨ Low-fat dairy products.  ¨ Nuts, avocado, olive oil, and canola oil.  · Make a food plan with a specialist (dietitian).  · Follow instructions from your doctor about what you cannot eat or drink.  · Drink enough fluid to keep your pee (urine) clear or pale yellow.  · Eat healthy snacks between healthy meals.  · Keep track of carbs that you eat. Read food labels. Learn food serving sizes.  · Follow your sick day plan when you cannot eat or drink normally. Make this plan with your doctor so it is ready to use.  Activity  · Exercise at least 3 times a week.  · Do not go more than 2 days without exercising.  · Talk with your doctor before you start a new exercise. Your doctor may need to adjust your insulin, medicines, or food.  Lifestyle   · Do not use any tobacco products. These include cigarettes, chewing tobacco, and e-cigarettes.If you need help quitting, ask your doctor.  · Ask your doctor how much alcohol is safe for you.  · Learn to deal with stress. If you need help with this, ask your doctor.  Body care  · Stay up to date with your shots (immunizations).  · Have your eyes and feet checked by a doctor as often as told.  · Check  your skin and feet every day. Check for cuts, bruises, redness, blisters, or sores.  · Brush your teeth and gums two times a day.  · Floss at least one time a day.  · Go to the dentist least one time every 6 months.  · Stay at a healthy weight.  General instructions   · Take over-the-counter and prescription medicines only as told by your doctor.  · Share your diabetes care plan with:  ¨ Your work or school.  ¨ People you live with.  · Check your pee (urine) for ketones:  ¨ When you are sick.  ¨ As told by your doctor.  · Carry a card or wear jewelry that says that you have diabetes.  · Ask your doctor:  ¨ Do I need to meet with a diabetes educator?  ¨ Where can I find a support group for people with diabetes?  · Keep all follow-up visits as told by your doctor. This is important.  Where to find more information:  To learn more about diabetes, visit:  · American Diabetes Association: www.diabetes.org  · American Association of Diabetes Educators: www.diabeteseducator.org/patient-resources  This information is not intended to replace advice given to you by your health care provider. Make sure you discuss any questions you have with your health care provider.  Document Released: 04/10/2017 Document Revised: 05/25/2017 Document Reviewed: 01/20/2017  [employer] Interactive Patient Education © 2017 [employer]    Metformin tablets  What is this medicine?  METFORMIN (met FOR min) is used to treat type 2 diabetes. It helps to control blood sugar. Treatment is combined with diet and exercise. This medicine can be used alone or with other medicines for diabetes.  This medicine may be used for other purposes; ask your health care provider or pharmacist if you have questions.  COMMON BRAND NAME(S): Glucophage  What should I tell my health care provider before I take this medicine?  They need to know if you have any of these conditions:  -anemia  -frequently drink alcohol-containing beverages  -become easily dehydrated  -heart  attack  -heart failure that is treated with medications  -kidney disease  -liver disease  -polycystic ovary syndrome  -serious infection or injury  -vomiting  -an unusual or allergic reaction to metformin, other medicines, foods, dyes, or preservatives  -pregnant or trying to get pregnant  -breast-feeding  How should I use this medicine?  Take this medicine by mouth. Take it with meals. Swallow the tablets with a drink of water. Follow the directions on the prescription label. Take your medicine at regular intervals. Do not take your medicine more often than directed.  Talk to your pediatrician regarding the use of this medicine in children. While this drug may be prescribed for children as young as 10 years of age for selected conditions, precautions do apply.  Overdosage: If you think you have taken too much of this medicine contact a poison control center or emergency room at once.  NOTE: This medicine is only for you. Do not share this medicine with others.  What if I miss a dose?  If you miss a dose, take it as soon as you can. If it is almost time for your next dose, take only that dose. Do not take double or extra doses.  What may interact with this medicine?  Do not take this medicine with any of the following medications:  -dofetilide  -gatifloxacin  -certain contrast medicines given before X-rays, CT scans, MRI, or other procedures  This medicine may also interact with the following medications:  -acetazolamide  -certain medicines for HIV infection or hepatitis, like adefovir, emtricitabine, entecavir, lamivudine, or tenofovir  -cimetidine  -crizotinib  -digoxin  -diuretics  -female hormones, like estrogens or progestins and birth control pills  -glycopyrrolate  -isoniazid  -lamotrigine  -medicines for blood pressure, heart disease, irregular heart beat  -memantine  -midodrine  -methazolamide  -morphine  -nicotinic acid  -phenothiazines like chlorpromazine, mesoridazine, prochlorperazine,  thioridazine  -phenytoin  -procainamide  -propantheline  -quinidine  -quinine  -ranitidine  -ranolazine  -steroid medicines like prednisone or cortisone  -stimulant medicines for attention disorders, weight loss, or to stay awake  -thyroid medicines  -topiramate  -trimethoprim  -trospium  -vancomycin  -vandetanib  -zonisamide  This list may not describe all possible interactions. Give your health care provider a list of all the medicines, herbs, non-prescription drugs, or dietary supplements you use. Also tell them if you smoke, drink alcohol, or use illegal drugs. Some items may interact with your medicine.  What should I watch for while using this medicine?  Visit your doctor or health care professional for regular checks on your progress.  A test called the HbA1C (A1C) will be monitored. This is a simple blood test. It measures your blood sugar control over the last 2 to 3 months. You will receive this test every 3 to 6 months.  Learn how to check your blood sugar. Learn the symptoms of low and high blood sugar and how to manage them.  Always carry a quick-source of sugar with you in case you have symptoms of low blood sugar. Examples include hard sugar candy or glucose tablets. Make sure others know that you can choke if you eat or drink when you develop serious symptoms of low blood sugar, such as seizures or unconsciousness. They must get medical help at once.  Tell your doctor or health care professional if you have high blood sugar. You might need to change the dose of your medicine. If you are sick or exercising more than usual, you might need to change the dose of your medicine.  Do not skip meals. Ask your doctor or health care professional if you should avoid alcohol. Many nonprescription cough and cold products contain sugar or alcohol. These can affect blood sugar.  This medicine may cause ovulation in premenopausal women who do not have regular monthly periods. This may increase your chances of  becoming pregnant. You should not take this medicine if you become pregnant or think you may be pregnant. Talk with your doctor or health care professional about your birth control options while taking this medicine. Contact your doctor or health care professional right away if think you are pregnant.  If you are going to need surgery, a MRI, CT scan, or other procedure, tell your doctor that you are taking this medicine. You may need to stop taking this medicine before the procedure.  Wear a medical ID bracelet or chain, and carry a card that describes your disease and details of your medicine and dosage times.  What side effects may I notice from receiving this medicine?  Side effects that you should report to your doctor or health care professional as soon as possible:  -allergic reactions like skin rash, itching or hives, swelling of the face, lips, or tongue  -breathing problems  -feeling faint or lightheaded, falls  -muscle aches or pains  -signs and symptoms of low blood sugar such as feeling anxious, confusion, dizziness, increased hunger, unusually weak or tired, sweating, shakiness, cold, irritable, headache, blurred vision, fast heartbeat, loss of consciousness  -slow or irregular heartbeat  -unusual stomach pain or discomfort  -unusually tired or weak  Side effects that usually do not require medical attention (report to your doctor or health care professional if they continue or are bothersome):  -diarrhea  -headache  -heartburn  -metallic taste in mouth  -nausea  -stomach gas, upset  This list may not describe all possible side effects. Call your doctor for medical advice about side effects. You may report side effects to FDA at 6-989-FDA-0592.  Where should I keep my medicine?  Keep out of the reach of children.  Store at room temperature between 15 and 30 degrees C (59 and 86 degrees F). Protect from moisture and light. Throw away any unused medicine after the expiration date.  NOTE: This sheet is a  summary. It may not cover all possible information. If you have questions about this medicine, talk to your doctor, pharmacist, or health care provider.  © 2018 [employer] (2015-06-02 22:14:40)    Amoxicillin capsules or tablets  What is this medicine?  AMOXICILLIN (a mox i DIO in) is a penicillin antibiotic. It is used to treat certain kinds of bacterial infections. It will not work for colds, flu, or other viral infections.  This medicine may be used for other purposes; ask your health care provider or pharmacist if you have questions.  COMMON BRAND NAME(S): Amoxil, Moxilin, Sumox, Trimox  What should I tell my health care provider before I take this medicine?  They need to know if you have any of these conditions:  -asthma  -kidney disease  -an unusual or allergic reaction to amoxicillin, other penicillins, cephalosporin antibiotics, other medicines, foods, dyes, or preservatives  -pregnant or trying to get pregnant  -breast-feeding  How should I use this medicine?  Take this medicine by mouth with a glass of water. Follow the directions on your prescription label. You may take this medicine with food or on an empty stomach. Take your medicine at regular intervals. Do not take your medicine more often than directed. Take all of your medicine as directed even if you think your are better. Do not skip doses or stop your medicine early.  Talk to your pediatrician regarding the use of this medicine in children. While this drug may be prescribed for selected conditions, precautions do apply.  Overdosage: If you think you have taken too much of this medicine contact a poison control center or emergency room at once.  NOTE: This medicine is only for you. Do not share this medicine with others.  What if I miss a dose?  If you miss a dose, take it as soon as you can. If it is almost time for your next dose, take only that dose. Do not take double or extra doses.  What may interact with this  medicine?  -amiloride  -birth control pills  -chloramphenicol  -macrolides  -probenecid  -sulfonamides  -tetracyclines  This list may not describe all possible interactions. Give your health care provider a list of all the medicines, herbs, non-prescription drugs, or dietary supplements you use. Also tell them if you smoke, drink alcohol, or use illegal drugs. Some items may interact with your medicine.  What should I watch for while using this medicine?  Tell your doctor or health care professional if your symptoms do not improve in 2 or 3 days. Take all of the doses of your medicine as directed. Do not skip doses or stop your medicine early.  If you are diabetic, you may get a false positive result for sugar in your urine with certain brands of urine tests. Check with your doctor.  Do not treat diarrhea with over-the-counter products. Contact your doctor if you have diarrhea that lasts more than 2 days or if the diarrhea is severe and watery.  What side effects may I notice from receiving this medicine?  Side effects that you should report to your doctor or health care professional as soon as possible:  -allergic reactions like skin rash, itching or hives, swelling of the face, lips, or tongue  -breathing problems  -dark urine  -redness, blistering, peeling or loosening of the skin, including inside the mouth  -seizures  -severe or watery diarrhea  -trouble passing urine or change in the amount of urine  -unusual bleeding or bruising  -unusually weak or tired  -yellowing of the eyes or skin  Side effects that usually do not require medical attention (report to your doctor or health care professional if they continue or are bothersome):  -dizziness  -headache  -stomach upset  -trouble sleeping  This list may not describe all possible side effects. Call your doctor for medical advice about side effects. You may report side effects to FDA at 8-044-FDA-9309.  Where should I keep my medicine?  Keep out of the reach of  children.  Store between 68 and 77 degrees F (20 and 25 degrees C). Keep bottle closed tightly. Throw away any unused medicine after the expiration date.  NOTE: This sheet is a summary. It may not cover all possible information. If you have questions about this medicine, talk to your doctor, pharmacist, or health care provider.  © 2018 [employer] Standard (2009-03-10 14:10:59)    Lisinopril tablets  What is this medicine?  LISINOPRIL (lyse IN oh pril) is an ACE inhibitor. This medicine is used to treat high blood pressure and heart failure. It is also used to protect the heart immediately after a heart attack.  This medicine may be used for other purposes; ask your health care provider or pharmacist if you have questions.  COMMON BRAND NAME(S): Prinivil, Zestril  What should I tell my health care provider before I take this medicine?  They need to know if you have any of these conditions:  -diabetes  -heart or blood vessel disease  -kidney disease  -low blood pressure  -previous swelling of the tongue, face, or lips with difficulty breathing, difficulty swallowing, hoarseness, or tightening of the throat  -an unusual or allergic reaction to lisinopril, other ACE inhibitors, insect venom, foods, dyes, or preservatives  -pregnant or trying to get pregnant  -breast-feeding  How should I use this medicine?  Take this medicine by mouth with a glass of water. Follow the directions on your prescription label. You may take this medicine with or without food. If it upsets your stomach, take it with food. Take your medicine at regular intervals. Do not take it more often than directed. Do not stop taking except on your doctor's advice.  Talk to your pediatrician regarding the use of this medicine in children. Special care may be needed. While this drug may be prescribed for children as young as 6 years of age for selected conditions, precautions do apply.  Overdosage: If you think you have taken too much of this medicine  contact a poison control center or emergency room at once.  NOTE: This medicine is only for you. Do not share this medicine with others.  What if I miss a dose?  If you miss a dose, take it as soon as you can. If it is almost time for your next dose, take only that dose. Do not take double or extra doses.  What may interact with this medicine?  Do not take this medicine with any of the following medications:  -hymenoptera venom  -sacubitril; valsartan  This medicines may also interact with the following medications:  -aliskiren  -angiotensin receptor blockers, like losartan or valsartan  -certain medicines for diabetes  -diuretics  -everolimus  -gold compounds  -lithium  -NSAIDs, medicines for pain and inflammation, like ibuprofen or naproxen  -potassium salts or supplements  -salt substitutes  -sirolimus  -temsirolimus  This list may not describe all possible interactions. Give your health care provider a list of all the medicines, herbs, non-prescription drugs, or dietary supplements you use. Also tell them if you smoke, drink alcohol, or use illegal drugs. Some items may interact with your medicine.  What should I watch for while using this medicine?  Visit your doctor or health care professional for regular check ups. Check your blood pressure as directed. Ask your doctor what your blood pressure should be, and when you should contact him or her.  Do not treat yourself for coughs, colds, or pain while you are using this medicine without asking your doctor or health care professional for advice. Some ingredients may increase your blood pressure.  Women should inform their doctor if they wish to become pregnant or think they might be pregnant. There is a potential for serious side effects to an unborn child. Talk to your health care professional or pharmacist for more information.  Check with your doctor or health care professional if you get an attack of severe diarrhea, nausea and vomiting, or if you sweat a  lot. The loss of too much body fluid can make it dangerous for you to take this medicine.  You may get drowsy or dizzy. Do not drive, use machinery, or do anything that needs mental alertness until you know how this drug affects you. Do not stand or sit up quickly, especially if you are an older patient. This reduces the risk of dizzy or fainting spells. Alcohol can make you more drowsy and dizzy. Avoid alcoholic drinks.  Avoid salt substitutes unless you are told otherwise by your doctor or health care professional.  What side effects may I notice from receiving this medicine?  Side effects that you should report to your doctor or health care professional as soon as possible:  -allergic reactions like skin rash, itching or hives, swelling of the hands, feet, face, lips, throat, or tongue  -breathing problems  -signs and symptoms of kidney injury like trouble passing urine or change in the amount of urine  -signs and symptoms of increased potassium like muscle weakness; chest pain; or fast, irregular heartbeat  -signs and symptoms of liver injury like dark yellow or brown urine; general ill feeling or flu-like symptoms; light-colored stools; loss of appetite; nausea; right upper belly pain; unusually weak or tired; yellowing of the eyes or skin  -signs and symptoms of low blood pressure like dizziness; feeling faint or lightheaded, falls; unusually weak or tired  -stomach pain with or without nausea and vomiting  Side effects that usually do not require medical attention (report to your doctor or health care professional if they continue or are bothersome):  -changes in taste  -cough  -dizziness  -fever  -headache  -sensitivity to light  This list may not describe all possible side effects. Call your doctor for medical advice about side effects. You may report side effects to FDA at 6-668-FDA-2819.  Where should I keep my medicine?  Keep out of the reach of children.  Store at room temperature between 15 and 30  degrees C (59 and 86 degrees F). Protect from moisture. Keep container tightly closed. Throw away any unused medicine after the expiration date.  NOTE: This sheet is a summary. It may not cover all possible information. If you have questions about this medicine, talk to your doctor, pharmacist, or health care provider.  © 2018 Elsevier/Gold Standard (2017-02-06 12:52:35)

## 2018-06-21 NOTE — DISCHARGE PLANNING
Anticipated Discharge Disposition: HH    Action: LSW requested [facility] contacts [facility] for an update on this referral.    Barriers to Discharge: C Acceptance.    Plan: Home today with C, pending [facility] Acceptance.

## 2018-06-21 NOTE — PROGRESS NOTES
Diabetes education: Attempted to meet with patient again but patient sleeping. Pt has an Hg a1c of 6.2% but is currently on Lantus 23 units at HS with Humalog sliding scale coverage ac and hs. Fasting blood sugars have been consistently below 100 (91, 91, 82, and 92) and patient has not used any Humalog except once today with blood sugar of 160 (2 units).  Not sure if he has given insulin before or if he has a meter ( did not indicate either at earlier discussion).  Plan: will patient need insulin at discharge? If so maybe lowering dose. Would pt benefit in going home on oral agents? Pt will need a meter and insulin instruction if going home on insulin. Please call [phone] when plans known.

## 2018-06-21 NOTE — WOUND TEAM
" [facility]  Inpatient Services  New Ostomy Management & Teaching    HPI:  Reviewed  PMH: Reviewed   SH: Reviewed    Subjective: \"I guess I'll get better the more I do this\"    Objective:  appliance intact     Ostomy type:  colostomy    Stoma location: LUQ   Stoma assessment:    Appearance: Red, edematous    Size: ~ 2\"    Protrusion:  slightly budded    Output:   Moderate soft brown    MC jxn  intact    Peristomal skin: intact     Ostomy Appliance (type and size):  2\" one piece convex with paste ring and belt    Interventions:  Met with patient who was asleep.  Note his pouch with large amount of stool.  He went to bathroom and emptied pouch independently.  When I returned he had traced and cut barrier and remembered to remove backing on barrier when applying paste ring.  He removed old appliance and cleansed peristomal skin with minimal prompting.  I applied appliance while he held abdomen and then he closed end of pouch, applied tape and attached belt.  Much better today with minimal prompts.  Sending home with 5 appliances and paste rings.     Pt education: Questions and concerns addressed    Evaluation: Patient continues to become more comfortable with care; much less prompts today     Plan: patient to discharge this afternoon     Anticipated discharge needs:  Has everything for discharge     "

## 2018-06-21 NOTE — PROGRESS NOTES
"Surgical Progress Note:    POD #6 S/P Davinci LAR with end colostomy and takedown of enterocutaneous fistula.   Doing well. Neg N/V, Ostomy with + FLATUS and stool from ostomy, Pain controlled, Denies chest pain or SOB.  Ambulating. Tolerating PO.  Comfortable with ostomy care.  Receiving diabetes teaching    PE:  /82   Pulse 76   Temp 36.8 °C (98.2 °F)   Resp 18   Ht 1.753 m (5' 9.02\")   Wt 117.6 kg (259 lb 4.2 oz)   SpO2 92%   BMI 38.27 kg/m²     I/O:   Intake/Output Summary (Last 24 hours) at 06/16/18 1129  Last data filed at 06/16/18 0700   Gross per 24 hour   Intake             1125 ml   Output             1225 ml   Net             -100 ml     Review of Systems   Constitutional: Negative.  Negative for chills and fever.   Respiratory: Negative for shortness of breath.    Cardiovascular: Negative for chest pain.   Gastrointestinal: Negative for nausea and vomiting.        +flatus/stool   Genitourinary: Negative.      Physical Exam   Constitutional:  appears well-developed.   Neck: Neck supple.   Cardiovascular: Normal rate.    Pulmonary/Chest: Effort normal.   Abdominal: Soft.  exhibits no distension. Appropriate tenderness.   Incisions clean, dry, and intact  Stoma pink and viable  Musculoskeletal: Normal range of motion.   Neurological:  alert.   Skin:  Warm and dry.   Extremities: polo, no edema    Labs:  Recent Labs      06/19/18   0259   WBC  7.9   RBC  4.21*   HEMOGLOBIN  11.4*   HEMATOCRIT  35.6*   MCV  84.6   MCH  27.1   RDW  46.1   PLATELETCT  318   MPV  9.0   NEUTSPOLYS  61.20   LYMPHOCYTES  21.70*   MONOCYTES  6.50   EOSINOPHILS  9.00*   BASOPHILS  0.60     Recent Labs      06/19/18   0259   SODIUM  134*   POTASSIUM  3.9   CHLORIDE  104   CO2  23   GLUCOSE  98   BUN  7*         A/P:   - Regular low residue diabetic diet as tolerated.  - IS Q One hour while awake  - Ambulate.  Out of bed for all meals please  - Pain controlled.  Cont PO pain medication  - Labs noted, stable  - DVT " propholaxis, ok to cont heparin, sequentials and ambulate  - Adequate urine output.  Cont, to monitor  - Change to PO antibiotics, then home on PO due to colocutaneous fistula from perf diverticulitis  - wound care to see pt regarding new ostomy and LLQ wound, pt to start doing own ostomy care  - shower ok  - diabetes teaching per primary, and out patient follow up  - ok for d/c from surgical standpoint.  Follow up with Dr. Willams in 1-2 weeks and prn    Discussed with Patient, RN and Dr. Imer VELASCO  [facility]  [phone]

## 2018-06-22 NOTE — DISCHARGE SUMMARY
Discharge Summary    CHIEF COMPLAINT ON ADMISSION  Abdominal drainage    Reason for Admission  Diverticulitis with complicated fistula    Admission Date  6/14/2018    CODE STATUS  Prior    HPI & HOSPITAL COURSE  67 y.o. male with past medical history of diverticulitis status post IR drain remotely, hypertension admitted 6/14/2018 with abrupt rupture of an enterocutaneous fistula due to recurrent diverticular disease . He was taken to the operating room by Dr. Wesly Willams on 6/15, has diverting ostomy. Ostomy was functioning well. Has residual wound in left lower quadrant, and was receiving wound care to this area. Unfortunately because the patient is significantly obese, he is not able to manage the dressing changes on this wound as it is beneath his pannus. Fortunately although patient lives alone he is very close to the [facility] and can go there for assistance with his wound care and ostomy.    Patient was found to be a diabetic on this admission with A1c of 6.2. His sugars were controlled with Lantus as an inpatient. He was seen by the diabetes educator and will follow-up with [facility] for diabetes management. As his A1c was not significantly high, I think he is appropriate for metformin at discharge.    In addition he was hypertensive and initiated on lisinopril.       Therefore, he is discharged in good and stable condition to home with close outpatient follow-up.    The patient met 2-midnight criteria for an inpatient stay at the time of discharge.    Discharge Date  6/21/2018    FOLLOW UP ITEMS POST DISCHARGE   health services for diabetes management, assistance with ostomy management  General primary care    DISCHARGE DIAGNOSES  Principal Problem:    Abdominal wall fistula POA: Unknown  Active Problems:    BMI 38.0-38.9,adult POA: Unknown    Diverticulitis POA: Unknown    H/O unilateral nephrectomy POA: Unknown    Renal cell adenocarcinoma (HCC) POA: Unknown    HTN (hypertension)  POA: Unknown    Abscess of abdominal cavity (HCC) POA: Unknown    Hyponatremia POA: Unknown    Hyperglycemia POA: Unknown  Resolved Problems:    * No resolved hospital problems. *      FOLLOW UP  No future appointments.  Wesly Willams M.D.  [address]  [address]  [place] [id number]  [phone]    Schedule an appointment as soon as possible for a visit in 2 weeks  For wound re-check, Routine follow-up      MEDICATIONS ON DISCHARGE     Medication List      START taking these medications      Instructions   amoxicillin-clavulanate 875-125 MG Tabs  Commonly known as:  AUGMENTIN   Take 1 Tab by mouth every 12 hours.  Dose:  1 Tab     lisinopril 10 MG Tabs  Commonly known as:  PRINIVIL   Take 1 Tab by mouth every day.  Dose:  10 mg     metFORMIN  MG Tb24  Commonly known as:  GLUCOPHAGE XR   Take 1 Tab by mouth every day.  Dose:  500 mg            Allergies  No Known Allergies    DIET  Consistent low carbohydrate    ACTIVITY  Light    CONSULTATIONS  General surgery-Wesly Willams M.D..    PROCEDURES  Operative Report     Date: 6/15/2018     Surgeon: Wesly Willams M.D.     Assistant: Christie Bell        Pre-operative Diagnosis:   Morbid obesity with BMI greater than 35  Hypertension  Diverticulitis with colocutaneous fistula     Post-operative Diagnosis: same     Procedure:   1)  ROBOTIC ASSISTED SIGMOID COLON RESECTION with creation of end colostomy     Indications: 67-year-old male with presumed diverticular disease has colocutaneous fistula and desires definitive surgical diagnosis and treatment.     An extensive PARQ conference was held with the patient and his family, in regard to treatment options for complicated diverticular disease. The patient was made aware of the alternatives, including operative and non-operative: Expectant management. The risks of bleeding, infection, damage to surrounding structures, need for reoperation, stomal complications such as parastomal hernia, prolapse or  ostomy dysfunction, fistula, hernia, leak, stroke, MI, and death were discussed with the patient. The patient was given a chance to ask questions, and all his questions were answered. The patient demonstrates adequate understanding, seems pleased with the plan, and wishes to proceed.     OPERATIVE FINDINGS: Sigmoid colon was densely adhered to the anterior abdominal wall the site of the colocutaneous fistula. This was all taken down in the sigmoid colon removed and a end colostomy created.     Procedure in detail: After informed consent was obtained, the patient was   taken to the operating room, placed in supine position on a pink pad. The patient underwent general endotracheal anesthesia without incident.  The patient's arms were tucked and the chest and shoulders were padded and secured to the operating room table.   The patient was then moved to lithotomy in yellowfin stirrups with all skin and joint surfaces padded and protected appropriately.The rectum was washed out with Betadine and the abdomen was prepped and draped in a sterile fashion. A timeout was performed verifying the correct patient, procedure, site, positioning in availability of equipment prior to the start of surgery.     Operation was begun by placing a 5 mm periumbilical incision through which a 5 mm trocar was introduced into the abdomen using the Optiview technique. After pneumoperitoneum was achieved, additional trocars were placed, 15 mm in the right lower quadrant and 8 mm in the left upper quadrant. An 8 and a 5 mm assistant port were placed and the camera port was upsized to an 8 mm trocar as well. All trocars were placed with 0.5% Marcaine without epinephrine for local anesthesia.     The patient was positioned in steep Trendelenburg and right lateral decubitus and before the da Wang robotic system was docked the patient was noted to be securely  positioned on the table.  We took down the left lateral attachments of the sigmoid colon,  identifying the left ureter which was preserved throughout the remainder of the procedure.  We then opened the retroperitoneal space in the right side and dissected in   the bloodless plane, isolated the INOCENCIO pedicle away from the pelvic nerves and   ureters. The INOCENCIO was then divided near its origin with a robotic stapler with a vascular load. We then used electrocautery to mobilize the left colon up to the splenic flexure,  taking down omental attachments and adhesions and producing adequate length   to reach the anterior abdominal wall for a tension-free colostomy.     Electrocautery was now used in the presacral space in the bloodless plane.   Dissection was carried down and left and right laterally, freeing up the   rectum. We chose a spot of healthy proximal rectum, divided the mesorectum with a   vessel sealer at this level and then divided the rectum itself with a green   load robotic staple fire.     We made an extraction incision in the left lower quadrant at the premarked colostomy site, carried down with   electrocautery to the level of the fascia. The anterior fascia was opened,   the muscles retracted medially and the posterior fascia opened as well. Wound  retractor was secured into position and the specimen delivered out onto the   operative field. The bowel was brought through this site and divided proximal to the diverticular disease. The fascia was closed with #1 PDS so that the defect fit the bowel without gaps. The abdomen was reinsufflated  We observed hemostasis throughout the operative field, closed the 12 mm trocar site with an Endoclose device. Pneumoperitoneum was reduced and all trocars were removed.     At this point numbers of the operating team changed into clean gown and gloves and all of the instruments used in the previous portions of the procedure were moved away from the operating field.  Instruments which had not been previously detached during  The case were now used  For the  remainder of the procedure. The colostomy was matured in the manner of Valerie with interrupted 3-0 Vicryl sutures. Skin incision was closed with 4-0 Monocryl. The colocutaneous fistula site was debrided and packed with iodoform. Dermabond was placed and the patient returned to the PACU in stable condition. All   instruments counts were correct at the end of the procedureThe patient was awakened from general anesthetic, and was taken to the recovery room in stable condition.     Specimen: Sigmoid colon and rectum     EBL: 150mL     UOP: See anesthetic record     Drains: None     Dispo: PACU     Wesly Willams MD PhD  [facility]  Colon and Rectal Surgeon  [phone]       LABORATORY  Lab Results   Component Value Date    SODIUM 134 (L) 06/19/2018    POTASSIUM 3.9 06/19/2018    CHLORIDE 104 06/19/2018    CO2 23 06/19/2018    GLUCOSE 98 06/19/2018    BUN 7 (L) 06/19/2018    CREATININE 0.70 06/19/2018        Lab Results   Component Value Date    WBC 7.9 06/19/2018    HEMOGLOBIN 11.4 (L) 06/19/2018    HEMATOCRIT 35.6 (L) 06/19/2018    PLATELETCT 318 06/19/2018        Total time of the discharge process exceeds 40 minutes.

## 2018-06-27 ENCOUNTER — HOSPITAL ENCOUNTER (OUTPATIENT)
Dept: RADIOLOGY | Facility: MEDICAL CENTER | Age: 68
End: 2018-06-27

## 2019-01-03 ENCOUNTER — APPOINTMENT (OUTPATIENT)
Dept: ADMISSIONS | Facility: MEDICAL CENTER | Age: 69
End: 2019-01-03
Attending: SURGERY
Payer: MEDICARE

## 2019-01-03 RX ORDER — ERGOCALCIFEROL 1.25 MG/1
50000 CAPSULE ORAL
COMMUNITY

## 2019-01-21 ENCOUNTER — HOSPITAL ENCOUNTER (OUTPATIENT)
Facility: MEDICAL CENTER | Age: 69
DRG: 346 | End: 2019-01-21
Attending: SURGERY | Admitting: SURGERY
Payer: MEDICARE

## 2019-01-21 VITALS
HEART RATE: 78 BPM | BODY MASS INDEX: 36.8 KG/M2 | HEIGHT: 69 IN | WEIGHT: 248.46 LBS | TEMPERATURE: 99 F | RESPIRATION RATE: 18 BRPM | OXYGEN SATURATION: 92 %

## 2019-01-21 LAB
GLUCOSE BLD-MCNC: 101 MG/DL (ref 65–99)
PATHOLOGY CONSULT NOTE: NORMAL

## 2019-01-21 PROCEDURE — 82962 GLUCOSE BLOOD TEST: CPT

## 2019-01-21 PROCEDURE — 88305 TISSUE EXAM BY PATHOLOGIST: CPT

## 2019-01-21 PROCEDURE — 99152 MOD SED SAME PHYS/QHP 5/>YRS: CPT | Performed by: SURGERY

## 2019-01-21 PROCEDURE — 160048 HCHG OR STATISTICAL LEVEL 1-5: Performed by: SURGERY

## 2019-01-21 PROCEDURE — G0500 MOD SEDAT ENDO SERVICE >5YRS: HCPCS | Performed by: SURGERY

## 2019-01-21 PROCEDURE — 160002 HCHG RECOVERY MINUTES (STAT): Performed by: SURGERY

## 2019-01-21 PROCEDURE — 160203 HCHG ENDO MINUTES - 1ST 30 MINS LEVEL 4: Performed by: SURGERY

## 2019-01-21 PROCEDURE — 700105 HCHG RX REV CODE 258: Performed by: SURGERY

## 2019-01-21 PROCEDURE — 99153 MOD SED SAME PHYS/QHP EA: CPT | Performed by: SURGERY

## 2019-01-21 PROCEDURE — 700111 HCHG RX REV CODE 636 W/ 250 OVERRIDE (IP)

## 2019-01-21 RX ORDER — MIDAZOLAM HYDROCHLORIDE 1 MG/ML
.5-2 INJECTION INTRAMUSCULAR; INTRAVENOUS PRN
Status: DISCONTINUED | OUTPATIENT
Start: 2019-01-21 | End: 2019-01-21 | Stop reason: HOSPADM

## 2019-01-21 RX ORDER — SODIUM CHLORIDE, SODIUM LACTATE, POTASSIUM CHLORIDE, CALCIUM CHLORIDE 600; 310; 30; 20 MG/100ML; MG/100ML; MG/100ML; MG/100ML
INJECTION, SOLUTION INTRAVENOUS ONCE
Status: COMPLETED | OUTPATIENT
Start: 2019-01-21 | End: 2019-01-21

## 2019-01-21 RX ORDER — MIDAZOLAM HYDROCHLORIDE 1 MG/ML
INJECTION INTRAMUSCULAR; INTRAVENOUS
Status: DISCONTINUED | OUTPATIENT
Start: 2019-01-21 | End: 2019-01-21 | Stop reason: HOSPADM

## 2019-01-21 RX ORDER — SODIUM CHLORIDE 9 MG/ML
500 INJECTION, SOLUTION INTRAVENOUS
Status: DISCONTINUED | OUTPATIENT
Start: 2019-01-21 | End: 2019-01-21 | Stop reason: HOSPADM

## 2019-01-21 RX ADMIN — SODIUM CHLORIDE, POTASSIUM CHLORIDE, SODIUM LACTATE AND CALCIUM CHLORIDE: 600; 310; 30; 20 INJECTION, SOLUTION INTRAVENOUS at 08:45

## 2019-01-21 ASSESSMENT — PAIN SCALES - GENERAL
PAINLEVEL_OUTOF10: 0

## 2019-01-21 NOTE — OR NURSING
0953- Report received from endo via phone.  Pt arrives and VS obtained.    0935- Pt's ride in [facility].  Pt tolerating room air.  Pt placing new colostomy bag on stoma.  Area is intact with small red open spot to the right of pt's stoma.  Pt denies pain.  Pt sts he was told by Dr Willams that he could drink water until surgery time tomorrow.    1005- New colostomy bag placed.  Pt changing into his clothes.    1015- Pt provided with d/c instructions and paperwork.    1019- IV removed, pt ambulated out to ride waiting in [facility].

## 2019-01-21 NOTE — DISCHARGE INSTRUCTIONS
COLONOSCOPY OR FLEXIBLE SIGMOIDOSCOPY  1. If you received a barium enema, take a mild laxative such as dulcolax to clean out the barium.   2. Drink plenty of fluids. Eat a diet high in fiber; such foods as whole-grain breads, fresh fruit and vegetables, nuts are recommended.  3. You may notice a few drops of blood with your first bowel movement. If you develop any large amount of bleeding, black stools, a fever, or abdominal pain, call your doctor right away.   4. Call your doctor for test results in 14 days.  5. Don't drive or drink alcohol for 24 hours. The medication you received will make you too drowsy.   6. No heavy lifting, ASA products or ASA x 5 days   7. Additional instructions: Check in tomorrow at 5:30am, [place] 1st floor (Park in [facility]).          You should call 911 if you develop problems with breathing or chest pain.    Nurse's Signature: ___________________________________    I acknowledge receipt and understanding of these Home Care instructions.

## 2019-01-22 ENCOUNTER — HOSPITAL ENCOUNTER (INPATIENT)
Facility: MEDICAL CENTER | Age: 69
LOS: 3 days | DRG: 346 | End: 2019-01-25
Attending: SURGERY | Admitting: SURGERY
Payer: MEDICARE

## 2019-01-22 DIAGNOSIS — Z43.3 ATTENTION TO COLOSTOMY (HCC): ICD-10-CM

## 2019-01-22 DIAGNOSIS — G89.18 PAIN FOLLOWING SURGERY OR PROCEDURE: ICD-10-CM

## 2019-01-22 PROBLEM — K57.92 DIVERTICULITIS: Status: RESOLVED | Noted: 2018-06-14 | Resolved: 2019-01-22

## 2019-01-22 LAB
EKG IMPRESSION: NORMAL
GLUCOSE BLD-MCNC: 102 MG/DL (ref 65–99)
GLUCOSE BLD-MCNC: 149 MG/DL (ref 65–99)
GLUCOSE BLD-MCNC: 89 MG/DL (ref 65–99)
PATHOLOGY CONSULT NOTE: NORMAL

## 2019-01-22 PROCEDURE — 700111 HCHG RX REV CODE 636 W/ 250 OVERRIDE (IP): Performed by: ANESTHESIOLOGY

## 2019-01-22 PROCEDURE — A9270 NON-COVERED ITEM OR SERVICE: HCPCS | Performed by: NURSE PRACTITIONER

## 2019-01-22 PROCEDURE — 4A1BXSH MONITORING OF GASTROINTESTINAL VASCULAR PERFUSION USING INDOCYANINE GREEN DYE, EXTERNAL APPROACH: ICD-10-PCS | Performed by: SURGERY

## 2019-01-22 PROCEDURE — 700101 HCHG RX REV CODE 250

## 2019-01-22 PROCEDURE — 502240 HCHG MISC OR SUPPLY RC 0272: Performed by: SURGERY

## 2019-01-22 PROCEDURE — 0DSM4ZZ REPOSITION DESCENDING COLON, PERCUTANEOUS ENDOSCOPIC APPROACH: ICD-10-PCS | Performed by: SURGERY

## 2019-01-22 PROCEDURE — 160022 HCHG BLOCK: Performed by: SURGERY

## 2019-01-22 PROCEDURE — 160009 HCHG ANES TIME/MIN: Performed by: SURGERY

## 2019-01-22 PROCEDURE — 501570 HCHG TROCAR, SEPARATOR: Performed by: SURGERY

## 2019-01-22 PROCEDURE — A4314 CATH W/DRAINAGE 2-WAY LATEX: HCPCS | Performed by: SURGERY

## 2019-01-22 PROCEDURE — 93005 ELECTROCARDIOGRAM TRACING: CPT | Performed by: SURGERY

## 2019-01-22 PROCEDURE — 501838 HCHG SUTURE GENERAL: Performed by: SURGERY

## 2019-01-22 PROCEDURE — 700102 HCHG RX REV CODE 250 W/ 637 OVERRIDE(OP): Performed by: ANESTHESIOLOGY

## 2019-01-22 PROCEDURE — 700104 HCHG RX REV CODE 254

## 2019-01-22 PROCEDURE — 160042 HCHG SURGERY MINUTES - EA ADDL 1 MIN LEVEL 5: Performed by: SURGERY

## 2019-01-22 PROCEDURE — 770006 HCHG ROOM/CARE - MED/SURG/GYN SEMI*

## 2019-01-22 PROCEDURE — 160048 HCHG OR STATISTICAL LEVEL 1-5: Performed by: SURGERY

## 2019-01-22 PROCEDURE — 82962 GLUCOSE BLOOD TEST: CPT | Mod: 91

## 2019-01-22 PROCEDURE — 502714 HCHG ROBOTIC SURGERY SERVICES: Performed by: SURGERY

## 2019-01-22 PROCEDURE — 160031 HCHG SURGERY MINUTES - 1ST 30 MINS LEVEL 5: Performed by: SURGERY

## 2019-01-22 PROCEDURE — 302135 SEQUENTIAL COMPRESSION MACHINE: Performed by: SURGERY

## 2019-01-22 PROCEDURE — 160002 HCHG RECOVERY MINUTES (STAT): Performed by: SURGERY

## 2019-01-22 PROCEDURE — 501428 HCHG STAPLER, CURVED: Performed by: SURGERY

## 2019-01-22 PROCEDURE — 700111 HCHG RX REV CODE 636 W/ 250 OVERRIDE (IP)

## 2019-01-22 PROCEDURE — 500514 HCHG ENDOCLIP: Performed by: SURGERY

## 2019-01-22 PROCEDURE — 8E0W4CZ ROBOTIC ASSISTED PROCEDURE OF TRUNK REGION, PERCUTANEOUS ENDOSCOPIC APPROACH: ICD-10-PCS | Performed by: SURGERY

## 2019-01-22 PROCEDURE — A9270 NON-COVERED ITEM OR SERVICE: HCPCS | Performed by: ANESTHESIOLOGY

## 2019-01-22 PROCEDURE — 88307 TISSUE EXAM BY PATHOLOGIST: CPT

## 2019-01-22 PROCEDURE — 88300 SURGICAL PATH GROSS: CPT

## 2019-01-22 PROCEDURE — 88304 TISSUE EXAM BY PATHOLOGIST: CPT

## 2019-01-22 PROCEDURE — 160036 HCHG PACU - EA ADDL 30 MINS PHASE I: Performed by: SURGERY

## 2019-01-22 PROCEDURE — A6402 STERILE GAUZE <= 16 SQ IN: HCPCS | Performed by: SURGERY

## 2019-01-22 PROCEDURE — 160035 HCHG PACU - 1ST 60 MINS PHASE I: Performed by: SURGERY

## 2019-01-22 PROCEDURE — 700102 HCHG RX REV CODE 250 W/ 637 OVERRIDE(OP): Performed by: NURSE PRACTITIONER

## 2019-01-22 PROCEDURE — 93010 ELECTROCARDIOGRAM REPORT: CPT | Performed by: INTERNAL MEDICINE

## 2019-01-22 RX ORDER — ACETAMINOPHEN 500 MG
1000 TABLET ORAL EVERY 6 HOURS
Status: DISCONTINUED | OUTPATIENT
Start: 2019-01-22 | End: 2019-01-25 | Stop reason: HOSPADM

## 2019-01-22 RX ORDER — DEXTROSE MONOHYDRATE, SODIUM CHLORIDE, AND POTASSIUM CHLORIDE 50; 1.49; 4.5 G/1000ML; G/1000ML; G/1000ML
INJECTION, SOLUTION INTRAVENOUS CONTINUOUS
Status: DISPENSED | OUTPATIENT
Start: 2019-01-22 | End: 2019-01-22

## 2019-01-22 RX ORDER — HYDROMORPHONE HYDROCHLORIDE 1 MG/ML
0.2 INJECTION, SOLUTION INTRAMUSCULAR; INTRAVENOUS; SUBCUTANEOUS
Status: DISCONTINUED | OUTPATIENT
Start: 2019-01-22 | End: 2019-01-22 | Stop reason: HOSPADM

## 2019-01-22 RX ORDER — CELECOXIB 200 MG/1
200 CAPSULE ORAL ONCE
Status: COMPLETED | OUTPATIENT
Start: 2019-01-22 | End: 2019-01-22

## 2019-01-22 RX ORDER — CALCIUM CARBONATE 500 MG/1
500 TABLET, CHEWABLE ORAL
Status: DISCONTINUED | OUTPATIENT
Start: 2019-01-22 | End: 2019-01-25 | Stop reason: HOSPADM

## 2019-01-22 RX ORDER — DEXTROSE MONOHYDRATE 25 G/50ML
25 INJECTION, SOLUTION INTRAVENOUS
Status: DISCONTINUED | OUTPATIENT
Start: 2019-01-22 | End: 2019-01-25 | Stop reason: HOSPADM

## 2019-01-22 RX ORDER — OXYCODONE HCL 5 MG/5 ML
10 SOLUTION, ORAL ORAL
Status: COMPLETED | OUTPATIENT
Start: 2019-01-22 | End: 2019-01-22

## 2019-01-22 RX ORDER — MEPERIDINE HYDROCHLORIDE 25 MG/ML
12.5 INJECTION INTRAMUSCULAR; INTRAVENOUS; SUBCUTANEOUS
Status: DISCONTINUED | OUTPATIENT
Start: 2019-01-22 | End: 2019-01-22 | Stop reason: HOSPADM

## 2019-01-22 RX ORDER — OXYCODONE HCL 5 MG/5 ML
5 SOLUTION, ORAL ORAL
Status: COMPLETED | OUTPATIENT
Start: 2019-01-22 | End: 2019-01-22

## 2019-01-22 RX ORDER — ACETAMINOPHEN 500 MG
1000 TABLET ORAL ONCE
Status: COMPLETED | OUTPATIENT
Start: 2019-01-22 | End: 2019-01-22

## 2019-01-22 RX ORDER — HALOPERIDOL 5 MG/ML
1 INJECTION INTRAMUSCULAR
Status: DISCONTINUED | OUTPATIENT
Start: 2019-01-22 | End: 2019-01-22 | Stop reason: HOSPADM

## 2019-01-22 RX ORDER — MIDAZOLAM HYDROCHLORIDE 1 MG/ML
1 INJECTION INTRAMUSCULAR; INTRAVENOUS
Status: DISCONTINUED | OUTPATIENT
Start: 2019-01-22 | End: 2019-01-22 | Stop reason: HOSPADM

## 2019-01-22 RX ORDER — HYDROMORPHONE HYDROCHLORIDE 1 MG/ML
0.1 INJECTION, SOLUTION INTRAMUSCULAR; INTRAVENOUS; SUBCUTANEOUS
Status: DISCONTINUED | OUTPATIENT
Start: 2019-01-22 | End: 2019-01-22 | Stop reason: HOSPADM

## 2019-01-22 RX ORDER — SODIUM CHLORIDE, SODIUM LACTATE, POTASSIUM CHLORIDE, CALCIUM CHLORIDE 600; 310; 30; 20 MG/100ML; MG/100ML; MG/100ML; MG/100ML
INJECTION, SOLUTION INTRAVENOUS CONTINUOUS
Status: DISCONTINUED | OUTPATIENT
Start: 2019-01-22 | End: 2019-01-22 | Stop reason: HOSPADM

## 2019-01-22 RX ORDER — ONDANSETRON 2 MG/ML
4 INJECTION INTRAMUSCULAR; INTRAVENOUS EVERY 4 HOURS PRN
Status: DISCONTINUED | OUTPATIENT
Start: 2019-01-22 | End: 2019-01-25 | Stop reason: HOSPADM

## 2019-01-22 RX ORDER — OXYCODONE HYDROCHLORIDE 5 MG/1
2.5 TABLET ORAL
Status: DISCONTINUED | OUTPATIENT
Start: 2019-01-22 | End: 2019-01-25 | Stop reason: HOSPADM

## 2019-01-22 RX ORDER — DEXAMETHASONE SODIUM PHOSPHATE 4 MG/ML
4 INJECTION, SOLUTION INTRA-ARTICULAR; INTRALESIONAL; INTRAMUSCULAR; INTRAVENOUS; SOFT TISSUE
Status: DISCONTINUED | OUTPATIENT
Start: 2019-01-22 | End: 2019-01-25 | Stop reason: HOSPADM

## 2019-01-22 RX ORDER — HEPARIN SODIUM 5000 [USP'U]/ML
5000 INJECTION, SOLUTION INTRAVENOUS; SUBCUTANEOUS EVERY 8 HOURS
Status: DISCONTINUED | OUTPATIENT
Start: 2019-01-23 | End: 2019-01-25 | Stop reason: HOSPADM

## 2019-01-22 RX ORDER — HYDRALAZINE HYDROCHLORIDE 20 MG/ML
5 INJECTION INTRAMUSCULAR; INTRAVENOUS
Status: DISCONTINUED | OUTPATIENT
Start: 2019-01-22 | End: 2019-01-22 | Stop reason: HOSPADM

## 2019-01-22 RX ORDER — DIPHENHYDRAMINE HYDROCHLORIDE 50 MG/ML
25 INJECTION INTRAMUSCULAR; INTRAVENOUS EVERY 6 HOURS PRN
Status: DISCONTINUED | OUTPATIENT
Start: 2019-01-22 | End: 2019-01-25 | Stop reason: HOSPADM

## 2019-01-22 RX ORDER — SCOLOPAMINE TRANSDERMAL SYSTEM 1 MG/1
1 PATCH, EXTENDED RELEASE TRANSDERMAL
Status: DISCONTINUED | OUTPATIENT
Start: 2019-01-22 | End: 2019-01-25 | Stop reason: HOSPADM

## 2019-01-22 RX ORDER — SODIUM CHLORIDE, SODIUM LACTATE, POTASSIUM CHLORIDE, CALCIUM CHLORIDE 600; 310; 30; 20 MG/100ML; MG/100ML; MG/100ML; MG/100ML
INJECTION, SOLUTION INTRAVENOUS ONCE
Status: COMPLETED | OUTPATIENT
Start: 2019-01-22 | End: 2019-01-22

## 2019-01-22 RX ORDER — LIDOCAINE HYDROCHLORIDE 10 MG/ML
INJECTION, SOLUTION INFILTRATION; PERINEURAL
Status: COMPLETED
Start: 2019-01-22 | End: 2019-01-22

## 2019-01-22 RX ORDER — METOPROLOL TARTRATE 1 MG/ML
1 INJECTION, SOLUTION INTRAVENOUS
Status: DISCONTINUED | OUTPATIENT
Start: 2019-01-22 | End: 2019-01-22 | Stop reason: HOSPADM

## 2019-01-22 RX ORDER — NEOMYCIN SULFATE 500 MG/1
1000 TABLET ORAL 3 TIMES DAILY
Status: ON HOLD | COMMUNITY
End: 2019-01-22

## 2019-01-22 RX ORDER — HALOPERIDOL 5 MG/ML
1 INJECTION INTRAMUSCULAR EVERY 6 HOURS PRN
Status: DISCONTINUED | OUTPATIENT
Start: 2019-01-22 | End: 2019-01-25 | Stop reason: HOSPADM

## 2019-01-22 RX ORDER — IBUPROFEN 800 MG/1
800 TABLET ORAL
Status: DISCONTINUED | OUTPATIENT
Start: 2019-01-22 | End: 2019-01-25 | Stop reason: HOSPADM

## 2019-01-22 RX ORDER — HYDROMORPHONE HYDROCHLORIDE 1 MG/ML
0.4 INJECTION, SOLUTION INTRAMUSCULAR; INTRAVENOUS; SUBCUTANEOUS
Status: DISCONTINUED | OUTPATIENT
Start: 2019-01-22 | End: 2019-01-22 | Stop reason: HOSPADM

## 2019-01-22 RX ORDER — GABAPENTIN 300 MG/1
600 CAPSULE ORAL ONCE
Status: COMPLETED | OUTPATIENT
Start: 2019-01-22 | End: 2019-01-22

## 2019-01-22 RX ORDER — METRONIDAZOLE 500 MG/1
500 TABLET ORAL 3 TIMES DAILY
Status: ON HOLD | COMMUNITY
End: 2019-01-22

## 2019-01-22 RX ORDER — ONDANSETRON 2 MG/ML
4 INJECTION INTRAMUSCULAR; INTRAVENOUS
Status: DISCONTINUED | OUTPATIENT
Start: 2019-01-22 | End: 2019-01-22 | Stop reason: HOSPADM

## 2019-01-22 RX ADMIN — ACETAMINOPHEN 1000 MG: 500 TABLET, FILM COATED ORAL at 07:30

## 2019-01-22 RX ADMIN — ACETAMINOPHEN 1000 MG: 500 TABLET ORAL at 17:52

## 2019-01-22 RX ADMIN — SODIUM CHLORIDE, SODIUM LACTATE, POTASSIUM CHLORIDE, CALCIUM CHLORIDE 50 ML: 600; 310; 30; 20 INJECTION, SOLUTION INTRAVENOUS at 11:45

## 2019-01-22 RX ADMIN — Medication 0.5 ML: at 06:34

## 2019-01-22 RX ADMIN — SODIUM CHLORIDE, SODIUM LACTATE, POTASSIUM CHLORIDE, CALCIUM CHLORIDE: 600; 310; 30; 20 INJECTION, SOLUTION INTRAVENOUS at 06:34

## 2019-01-22 RX ADMIN — IBUPROFEN 800 MG: 800 TABLET, FILM COATED ORAL at 17:52

## 2019-01-22 RX ADMIN — OXYCODONE HYDROCHLORIDE 10 MG: 5 SOLUTION ORAL at 12:35

## 2019-01-22 RX ADMIN — GABAPENTIN 600 MG: 300 CAPSULE ORAL at 07:30

## 2019-01-22 RX ADMIN — CELECOXIB 200 MG: 200 CAPSULE ORAL at 07:30

## 2019-01-22 RX ADMIN — LIDOCAINE HYDROCHLORIDE 0.5 ML: 10 INJECTION, SOLUTION INFILTRATION; PERINEURAL at 06:34

## 2019-01-22 ASSESSMENT — PATIENT HEALTH QUESTIONNAIRE - PHQ9
8. MOVING OR SPEAKING SO SLOWLY THAT OTHER PEOPLE COULD HAVE NOTICED. OR THE OPPOSITE, BEING SO FIGETY OR RESTLESS THAT YOU HAVE BEEN MOVING AROUND A LOT MORE THAN USUAL: NOT AT ALL
5. POOR APPETITE OR OVEREATING: NOT AT ALL
3. TROUBLE FALLING OR STAYING ASLEEP OR SLEEPING TOO MUCH: NOT AT ALL
1. LITTLE INTEREST OR PLEASURE IN DOING THINGS: SEVERAL DAYS
2. FEELING DOWN, DEPRESSED, IRRITABLE, OR HOPELESS: SEVERAL DAYS
9. THOUGHTS THAT YOU WOULD BE BETTER OFF DEAD, OR OF HURTING YOURSELF: NOT AT ALL
SUM OF ALL RESPONSES TO PHQ QUESTIONS 1-9: 4
4. FEELING TIRED OR HAVING LITTLE ENERGY: SEVERAL DAYS
SUM OF ALL RESPONSES TO PHQ9 QUESTIONS 1 AND 2: 2
7. TROUBLE CONCENTRATING ON THINGS, SUCH AS READING THE NEWSPAPER OR WATCHING TELEVISION: NOT AT ALL
6. FEELING BAD ABOUT YOURSELF - OR THAT YOU ARE A FAILURE OR HAVE LET YOURSELF OR YOUR FAMILY DOWN: SEVERAL DAYS

## 2019-01-22 ASSESSMENT — PAIN SCALES - GENERAL
PAINLEVEL_OUTOF10: 2
PAINLEVEL_OUTOF10: 0
PAINLEVEL_OUTOF10: 2
PAINLEVEL_OUTOF10: 0
PAINLEVEL_OUTOF10: 2
PAINLEVEL_OUTOF10: 0

## 2019-01-22 ASSESSMENT — COGNITIVE AND FUNCTIONAL STATUS - GENERAL
SUGGESTED CMS G CODE MODIFIER MOBILITY: CK
TOILETING: A LITTLE
WALKING IN HOSPITAL ROOM: A LITTLE
SUGGESTED CMS G CODE MODIFIER DAILY ACTIVITY: CJ
TURNING FROM BACK TO SIDE WHILE IN FLAT BAD: A LITTLE
MOVING FROM LYING ON BACK TO SITTING ON SIDE OF FLAT BED: A LITTLE
MOVING TO AND FROM BED TO CHAIR: A LITTLE
MOBILITY SCORE: 18
CLIMB 3 TO 5 STEPS WITH RAILING: A LITTLE
STANDING UP FROM CHAIR USING ARMS: A LITTLE
DRESSING REGULAR UPPER BODY CLOTHING: A LITTLE
DAILY ACTIVITIY SCORE: 22

## 2019-01-22 ASSESSMENT — LIFESTYLE VARIABLES: EVER_SMOKED: NEVER

## 2019-01-22 NOTE — OR SURGEON
Immediate Post OP Note    PreOp Diagnosis:   Colostomy in place  Morbid obesity with BMI greater than 35  History of renal cell adenocarcinoma with unilateral nephrectomy  Hypertension    PostOp Diagnosis: Same    Procedure(s):  robotic assisted laparoscopic colostomy reversal, splenic flexure mobilization - Wound Class: Contaminated    Surgeon(s):  Wesly Willams M.D.    Anesthesiologist/Type of Anesthesia:  Anesthesiologist: Anibal Elaine D.O./General    Surgical Staff:  Assistant: Christie Bell  Circulator: Ingris Guillen R.N.  Relief Circulator: Joana Knox  Relief Scrub: Ashwin Rizzo; Angy Clark; Libra Valencia  Scrub Person: Chelly Holloway [place]: Carlo Thomas R.N.    Specimens removed if any:  ID Type Source Tests Collected by Time Destination   A : hernia sac Other Other PATHOLOGY SPECIMEN Wesly Willams M.D. 1/22/2019 10:04 AM    B : colostomy end Other Other PATHOLOGY SPECIMEN Wesly Willams M.D. 1/22/2019 10:06 AM    C : rectal stump Other Other PATHOLOGY SPECIMEN Wesly Willams M.D. 1/22/2019 10:09 AM        Estimated Blood Loss: 150 mL    Findings: Stapled in the anastomosis after colostomy reversal    Complications: No complications noted        1/22/2019 11:06 AM Wesly Willams M.D.

## 2019-01-22 NOTE — OR NURSING
Patient is doing well in recovery. ERAS protocol followed. Patient medicated with oral medications. He has had only soreness to his abdomen. Bilateral tap block given by anesthesia. His belongings are at bedside and patient will be transferred to the floor via wheelchair. Tolerated juice in recovery.

## 2019-01-23 LAB
ANION GAP SERPL CALC-SCNC: 8 MMOL/L (ref 0–11.9)
BASOPHILS # BLD AUTO: 0.4 % (ref 0–1.8)
BASOPHILS # BLD: 0.04 K/UL (ref 0–0.12)
BUN SERPL-MCNC: 18 MG/DL (ref 8–22)
CALCIUM SERPL-MCNC: 9.2 MG/DL (ref 8.5–10.5)
CHLORIDE SERPL-SCNC: 98 MMOL/L (ref 96–112)
CO2 SERPL-SCNC: 22 MMOL/L (ref 20–33)
CREAT SERPL-MCNC: 1.6 MG/DL (ref 0.5–1.4)
EOSINOPHIL # BLD AUTO: 0.17 K/UL (ref 0–0.51)
EOSINOPHIL NFR BLD: 1.6 % (ref 0–6.9)
ERYTHROCYTE [DISTWIDTH] IN BLOOD BY AUTOMATED COUNT: 45.5 FL (ref 35.9–50)
GLUCOSE BLD-MCNC: 100 MG/DL (ref 65–99)
GLUCOSE BLD-MCNC: 111 MG/DL (ref 65–99)
GLUCOSE BLD-MCNC: 115 MG/DL (ref 65–99)
GLUCOSE BLD-MCNC: 127 MG/DL (ref 65–99)
GLUCOSE SERPL-MCNC: 104 MG/DL (ref 65–99)
HCT VFR BLD AUTO: 34.2 % (ref 42–52)
HGB BLD-MCNC: 10.8 G/DL (ref 14–18)
IMM GRANULOCYTES # BLD AUTO: 0.04 K/UL (ref 0–0.11)
IMM GRANULOCYTES NFR BLD AUTO: 0.4 % (ref 0–0.9)
LYMPHOCYTES # BLD AUTO: 0.98 K/UL (ref 1–4.8)
LYMPHOCYTES NFR BLD: 9 % (ref 22–41)
MCH RBC QN AUTO: 26.2 PG (ref 27–33)
MCHC RBC AUTO-ENTMCNC: 31.6 G/DL (ref 33.7–35.3)
MCV RBC AUTO: 83 FL (ref 81.4–97.8)
MONOCYTES # BLD AUTO: 0.65 K/UL (ref 0–0.85)
MONOCYTES NFR BLD AUTO: 6 % (ref 0–13.4)
NEUTROPHILS # BLD AUTO: 9.01 K/UL (ref 1.82–7.42)
NEUTROPHILS NFR BLD: 82.6 % (ref 44–72)
NRBC # BLD AUTO: 0 K/UL
NRBC BLD-RTO: 0 /100 WBC
PLATELET # BLD AUTO: 306 K/UL (ref 164–446)
PMV BLD AUTO: 8.7 FL (ref 9–12.9)
POTASSIUM SERPL-SCNC: 3.6 MMOL/L (ref 3.6–5.5)
RBC # BLD AUTO: 4.12 M/UL (ref 4.7–6.1)
SODIUM SERPL-SCNC: 128 MMOL/L (ref 135–145)
WBC # BLD AUTO: 10.9 K/UL (ref 4.8–10.8)

## 2019-01-23 PROCEDURE — 700111 HCHG RX REV CODE 636 W/ 250 OVERRIDE (IP): Performed by: NURSE PRACTITIONER

## 2019-01-23 PROCEDURE — 85025 COMPLETE CBC W/AUTO DIFF WBC: CPT

## 2019-01-23 PROCEDURE — 700102 HCHG RX REV CODE 250 W/ 637 OVERRIDE(OP): Performed by: NURSE PRACTITIONER

## 2019-01-23 PROCEDURE — 36415 COLL VENOUS BLD VENIPUNCTURE: CPT

## 2019-01-23 PROCEDURE — A9270 NON-COVERED ITEM OR SERVICE: HCPCS | Performed by: NURSE PRACTITIONER

## 2019-01-23 PROCEDURE — 80048 BASIC METABOLIC PNL TOTAL CA: CPT

## 2019-01-23 PROCEDURE — 82962 GLUCOSE BLOOD TEST: CPT | Mod: 91

## 2019-01-23 PROCEDURE — 770006 HCHG ROOM/CARE - MED/SURG/GYN SEMI*

## 2019-01-23 RX ORDER — METOCLOPRAMIDE HYDROCHLORIDE 5 MG/ML
10 INJECTION INTRAMUSCULAR; INTRAVENOUS EVERY 6 HOURS
Status: DISCONTINUED | OUTPATIENT
Start: 2019-01-23 | End: 2019-01-25 | Stop reason: HOSPADM

## 2019-01-23 RX ADMIN — IBUPROFEN 800 MG: 800 TABLET, FILM COATED ORAL at 17:57

## 2019-01-23 RX ADMIN — ACETAMINOPHEN 1000 MG: 500 TABLET ORAL at 17:56

## 2019-01-23 RX ADMIN — HEPARIN SODIUM 5000 UNITS: 5000 INJECTION, SOLUTION INTRAVENOUS; SUBCUTANEOUS at 15:41

## 2019-01-23 RX ADMIN — IBUPROFEN 800 MG: 800 TABLET, FILM COATED ORAL at 08:28

## 2019-01-23 RX ADMIN — ACETAMINOPHEN 1000 MG: 500 TABLET ORAL at 23:19

## 2019-01-23 RX ADMIN — IBUPROFEN 800 MG: 800 TABLET, FILM COATED ORAL at 11:45

## 2019-01-23 RX ADMIN — HEPARIN SODIUM 5000 UNITS: 5000 INJECTION, SOLUTION INTRAVENOUS; SUBCUTANEOUS at 08:28

## 2019-01-23 RX ADMIN — ACETAMINOPHEN 1000 MG: 500 TABLET ORAL at 05:09

## 2019-01-23 RX ADMIN — ACETAMINOPHEN 1000 MG: 500 TABLET ORAL at 11:45

## 2019-01-23 RX ADMIN — HEPARIN SODIUM 5000 UNITS: 5000 INJECTION, SOLUTION INTRAVENOUS; SUBCUTANEOUS at 21:10

## 2019-01-23 NOTE — CARE PLAN
Problem: Safety  Goal: Will remain free from falls  Outcome: PROGRESSING AS EXPECTED  Patient ambulate with standby assist. Call light within reach. Bed in lowest and locked position.    Problem: Knowledge Deficit  Goal: Knowledge of disease process/condition, treatment plan, diagnostic tests, and medications will improve    Intervention: Assess knowledge level of disease process/condition, treatment plan, diagnostic tests, and medications  Patient educated on surgical site. If dressing is saturated notify nurse. Patient verbalized understanding.

## 2019-01-23 NOTE — CARE PLAN
Problem: Communication  Goal: The ability to communicate needs accurately and effectively will improve  Outcome: PROGRESSING AS EXPECTED  Patient able to communicate effectively

## 2019-01-23 NOTE — PROGRESS NOTES
"Surgical Progress Note:    POD #1 S/P robotic assisted laparoscopic colostomy reversal, splenic flexure mobilization  Doing well. Neg N/V, + FLATUS/ no BM, Pain controlled, Denies chest pain or SOB.  Ambulating. Tolerating PO.    PE:  /64   Pulse 66   Temp 36.4 °C (97.5 °F) (Temporal)   Resp 17   Ht 1.753 m (5' 9\")   Wt 114.8 kg (253 lb 1.4 oz)   SpO2 94%   BMI 37.37 kg/m²     I/O:   Intake/Output Summary (Last 24 hours) at 01/23/19 1009  Last data filed at 01/22/19 2310   Gross per 24 hour   Intake              966 ml   Output              850 ml   Net              116 ml         Review of Systems   Constitutional: Negative.  Negative for chills and fever.   Respiratory: Negative for shortness of breath.    Cardiovascular: Negative for chest pain.   Gastrointestinal: Negative for nausea and vomiting.        +flatus/no stool   Genitourinary: Negative.      Physical Exam   Constitutional:  appears well-developed.   Neck: Neck supple.   Cardiovascular: Normal rate.    Pulmonary/Chest: Effort normal.   Abdominal: Soft.  exhibits no distension. Appropriate tenderness.   Incisions clean, dry, and intact    Musculoskeletal: Normal range of motion.   Neurological:  alert.   Skin:  Warm and dry.   Extremities: polo, no edema    Labs:  Recent Labs      01/23/19   0245   WBC  10.9*   RBC  4.12*   HEMOGLOBIN  10.8*   HEMATOCRIT  34.2*   MCV  83.0   MCH  26.2*   RDW  45.5   PLATELETCT  306   MPV  8.7*   NEUTSPOLYS  82.60*   LYMPHOCYTES  9.00*   MONOCYTES  6.00   EOSINOPHILS  1.60   BASOPHILS  0.40     Recent Labs      01/23/19   0245   SODIUM  128*   POTASSIUM  3.6   CHLORIDE  98   CO2  22   GLUCOSE  104*   BUN  18         A/P:     - Regular low residue diet as tolerated.  - IS Q One hour while awake  - Ambulate.  Out of bed for all meals please  - Pain controlled.  Cont PO pain medication  - Labs noted, recheck in am  - DVT propholaxis, ok to start Heparin, sequentials and ambulate  - Adequate urine output.  Cont, " to monitor  - IF continues to do well and tolerates PO, will plan for D/C tomorrow.     No new Assessment & Plan notes have been filed under this hospital service since the last note was generated.  Service: Surgery General      Discussed with Patient, RN and RAHEEM Irizarry  [facility]  [phone]

## 2019-01-23 NOTE — PROGRESS NOTES
Bedside report received.  Assessment complete.  A&O x 4. Patient calls appropriately.  Patient up with stand by  assist.  Patient declines pain medications at this time.   Denies N&V. Tolerating DM, GI soft diet.  Surgical lap sites to abdomen x4, LLQ incision, all to gauze and tegaderm, CDI.  + void, - flatus  Patient denies SOB.  Review plan with of care with patient. Call light and personal belongings with in reach. Hourly rounding in place. All needs met at this time.

## 2019-01-24 LAB
ANION GAP SERPL CALC-SCNC: 7 MMOL/L (ref 0–11.9)
ANION GAP SERPL CALC-SCNC: 8 MMOL/L (ref 0–11.9)
BASOPHILS # BLD AUTO: 0.4 % (ref 0–1.8)
BASOPHILS # BLD: 0.05 K/UL (ref 0–0.12)
BUN SERPL-MCNC: 30 MG/DL (ref 8–22)
BUN SERPL-MCNC: 38 MG/DL (ref 8–22)
CALCIUM SERPL-MCNC: 8.9 MG/DL (ref 8.5–10.5)
CALCIUM SERPL-MCNC: 8.9 MG/DL (ref 8.5–10.5)
CHLORIDE SERPL-SCNC: 95 MMOL/L (ref 96–112)
CHLORIDE SERPL-SCNC: 96 MMOL/L (ref 96–112)
CO2 SERPL-SCNC: 20 MMOL/L (ref 20–33)
CO2 SERPL-SCNC: 24 MMOL/L (ref 20–33)
CREAT SERPL-MCNC: 1.38 MG/DL (ref 0.5–1.4)
CREAT SERPL-MCNC: 2.1 MG/DL (ref 0.5–1.4)
EOSINOPHIL # BLD AUTO: 0.8 K/UL (ref 0–0.51)
EOSINOPHIL NFR BLD: 6.9 % (ref 0–6.9)
ERYTHROCYTE [DISTWIDTH] IN BLOOD BY AUTOMATED COUNT: 46.4 FL (ref 35.9–50)
GLUCOSE BLD-MCNC: 102 MG/DL (ref 65–99)
GLUCOSE BLD-MCNC: 104 MG/DL (ref 65–99)
GLUCOSE BLD-MCNC: 121 MG/DL (ref 65–99)
GLUCOSE BLD-MCNC: 133 MG/DL (ref 65–99)
GLUCOSE SERPL-MCNC: 102 MG/DL (ref 65–99)
GLUCOSE SERPL-MCNC: 129 MG/DL (ref 65–99)
HCT VFR BLD AUTO: 31.1 % (ref 42–52)
HGB BLD-MCNC: 10 G/DL (ref 14–18)
IMM GRANULOCYTES # BLD AUTO: 0.06 K/UL (ref 0–0.11)
IMM GRANULOCYTES NFR BLD AUTO: 0.5 % (ref 0–0.9)
LYMPHOCYTES # BLD AUTO: 0.99 K/UL (ref 1–4.8)
LYMPHOCYTES NFR BLD: 8.6 % (ref 22–41)
MCH RBC QN AUTO: 26.7 PG (ref 27–33)
MCHC RBC AUTO-ENTMCNC: 32.2 G/DL (ref 33.7–35.3)
MCV RBC AUTO: 82.9 FL (ref 81.4–97.8)
MONOCYTES # BLD AUTO: 0.66 K/UL (ref 0–0.85)
MONOCYTES NFR BLD AUTO: 5.7 % (ref 0–13.4)
NEUTROPHILS # BLD AUTO: 9.01 K/UL (ref 1.82–7.42)
NEUTROPHILS NFR BLD: 77.9 % (ref 44–72)
NRBC # BLD AUTO: 0 K/UL
NRBC BLD-RTO: 0 /100 WBC
PLATELET # BLD AUTO: 285 K/UL (ref 164–446)
PMV BLD AUTO: 8.9 FL (ref 9–12.9)
POTASSIUM SERPL-SCNC: 3.6 MMOL/L (ref 3.6–5.5)
POTASSIUM SERPL-SCNC: 3.7 MMOL/L (ref 3.6–5.5)
RBC # BLD AUTO: 3.75 M/UL (ref 4.7–6.1)
SODIUM SERPL-SCNC: 124 MMOL/L (ref 135–145)
SODIUM SERPL-SCNC: 126 MMOL/L (ref 135–145)
WBC # BLD AUTO: 11.6 K/UL (ref 4.8–10.8)

## 2019-01-24 PROCEDURE — 82962 GLUCOSE BLOOD TEST: CPT | Mod: 91

## 2019-01-24 PROCEDURE — 700102 HCHG RX REV CODE 250 W/ 637 OVERRIDE(OP): Performed by: NURSE PRACTITIONER

## 2019-01-24 PROCEDURE — 80048 BASIC METABOLIC PNL TOTAL CA: CPT | Mod: 91

## 2019-01-24 PROCEDURE — A9270 NON-COVERED ITEM OR SERVICE: HCPCS | Performed by: NURSE PRACTITIONER

## 2019-01-24 PROCEDURE — 85025 COMPLETE CBC W/AUTO DIFF WBC: CPT

## 2019-01-24 PROCEDURE — 700105 HCHG RX REV CODE 258: Performed by: NURSE PRACTITIONER

## 2019-01-24 PROCEDURE — 36415 COLL VENOUS BLD VENIPUNCTURE: CPT

## 2019-01-24 PROCEDURE — 770006 HCHG ROOM/CARE - MED/SURG/GYN SEMI*

## 2019-01-24 PROCEDURE — 700111 HCHG RX REV CODE 636 W/ 250 OVERRIDE (IP): Performed by: NURSE PRACTITIONER

## 2019-01-24 RX ORDER — SODIUM CHLORIDE 9 MG/ML
1000 INJECTION, SOLUTION INTRAVENOUS ONCE
Status: COMPLETED | OUTPATIENT
Start: 2019-01-24 | End: 2019-01-24

## 2019-01-24 RX ADMIN — SODIUM CHLORIDE 1000 ML: 9 INJECTION, SOLUTION INTRAVENOUS at 09:38

## 2019-01-24 RX ADMIN — ACETAMINOPHEN 1000 MG: 500 TABLET ORAL at 05:12

## 2019-01-24 RX ADMIN — IBUPROFEN 800 MG: 800 TABLET, FILM COATED ORAL at 17:51

## 2019-01-24 RX ADMIN — ACETAMINOPHEN 1000 MG: 500 TABLET ORAL at 17:51

## 2019-01-24 RX ADMIN — IBUPROFEN 800 MG: 800 TABLET, FILM COATED ORAL at 11:42

## 2019-01-24 RX ADMIN — HEPARIN SODIUM 5000 UNITS: 5000 INJECTION, SOLUTION INTRAVENOUS; SUBCUTANEOUS at 05:12

## 2019-01-24 RX ADMIN — IBUPROFEN 800 MG: 800 TABLET, FILM COATED ORAL at 08:02

## 2019-01-24 RX ADMIN — ACETAMINOPHEN 1000 MG: 500 TABLET ORAL at 23:59

## 2019-01-24 RX ADMIN — ACETAMINOPHEN 1000 MG: 500 TABLET ORAL at 11:42

## 2019-01-24 RX ADMIN — HEPARIN SODIUM 5000 UNITS: 5000 INJECTION, SOLUTION INTRAVENOUS; SUBCUTANEOUS at 15:07

## 2019-01-24 NOTE — CARE PLAN
Problem: Safety  Goal: Will remain free from injury  Outcome: PROGRESSING AS EXPECTED  Standby assistance when ambulating.    Problem: Pain Management  Goal: Pain level will decrease to patient's comfort goal  Patient's pain managed by Tylenol.

## 2019-01-24 NOTE — PROGRESS NOTES
Bedside report received.  Assessment complete.  A&O x 4. Patient calls appropriately.  Patient up with stand by  assist.  Patient declines pain medications at this time.   Denies N&V. Tolerating DM, GI soft diet.  Surgical lap sites to abdomen x4, LLQ incision, all to gauze and tegaderm, CDI.  + void, + flatus, last BM 1/24  Patient denies SOB.  Review plan with of care with patient. Call light and personal belongings with in reach. Hourly rounding in place. All needs met at this time

## 2019-01-24 NOTE — PROGRESS NOTES
"Surgical Progress Note:    POD #2 S/P robotic assisted laparoscopic colostomy reversal, splenic flexure mobilization  Doing well. Neg N/V, + FLATUS/ + BM, Pain controlled, Denies chest pain or SOB.  Ambulating. Tolerating PO.    PE:  /71   Pulse 83   Temp 36.7 °C (98.1 °F) (Temporal)   Resp 18   Ht 1.753 m (5' 9\")   Wt 114.8 kg (253 lb 1.4 oz)   SpO2 95%   BMI 37.37 kg/m²     I/O:   Intake/Output Summary (Last 24 hours) at 01/23/19 1009  Last data filed at 01/22/19 2310   Gross per 24 hour   Intake              966 ml   Output              850 ml   Net              116 ml         Review of Systems   Constitutional: Negative.  Negative for chills and fever.   Respiratory: Negative for shortness of breath.    Cardiovascular: Negative for chest pain.   Gastrointestinal: Negative for nausea and vomiting.        +flatus/+ stool   Genitourinary: Negative.      Physical Exam   Constitutional:  appears well-developed.   Neck: Neck supple.   Cardiovascular: Normal rate.    Pulmonary/Chest: Effort normal.   Abdominal: Soft.  exhibits no distension. Appropriate tenderness.   Incisions clean, dry, and intact    Musculoskeletal: Normal range of motion.   Neurological:  alert.   Skin:  Warm and dry.   Extremities: polo, no edema    Labs:  Recent Labs      01/23/19 0245  01/24/19 0314   WBC  10.9*  11.6*   RBC  4.12*  3.75*   HEMOGLOBIN  10.8*  10.0*   HEMATOCRIT  34.2*  31.1*   MCV  83.0  82.9   MCH  26.2*  26.7*   RDW  45.5  46.4   PLATELETCT  306  285   MPV  8.7*  8.9*   NEUTSPOLYS  82.60*  77.90*   LYMPHOCYTES  9.00*  8.60*   MONOCYTES  6.00  5.70   EOSINOPHILS  1.60  6.90   BASOPHILS  0.40  0.40     Recent Labs      01/23/19   0245  01/24/19 0314   SODIUM  128*  126*   POTASSIUM  3.6  3.6   CHLORIDE  98  95*   CO2  22  24   GLUCOSE  104*  102*   BUN  18  38*         A/P:     - Regular low residue diet as tolerated.  - IS Q One hour while awake  - Ambulate.  Out of bed for all meals please  - Pain controlled. "  Cont PO pain medication  - Labs noted, recheck in am  - Bolus 1 L fluids,  Encouraged pt to increase PO  - DVT propholaxis, ok to start Heparin, sequentials and ambulate  - Adequate urine output.  Cont, to monitor  - IF continues to do well and tolerates PO, and improved kidney function will plan for D/C tomorrow.     No new Assessment & Plan notes have been filed under this hospital service since the last note was generated.  Service: Surgery General      Discussed with Patient, RN and RAHEEM Irizarry  [facility]  [phone]

## 2019-01-25 VITALS
HEIGHT: 69 IN | SYSTOLIC BLOOD PRESSURE: 138 MMHG | DIASTOLIC BLOOD PRESSURE: 90 MMHG | RESPIRATION RATE: 18 BRPM | WEIGHT: 253.09 LBS | BODY MASS INDEX: 37.49 KG/M2 | TEMPERATURE: 97.9 F | HEART RATE: 71 BPM | OXYGEN SATURATION: 95 %

## 2019-01-25 PROBLEM — Z43.3 ATTENTION TO COLOSTOMY (HCC): Status: ACTIVE | Noted: 2019-01-25

## 2019-01-25 LAB
ANION GAP SERPL CALC-SCNC: 8 MMOL/L (ref 0–11.9)
BASOPHILS # BLD AUTO: 0.2 % (ref 0–1.8)
BASOPHILS # BLD: 0.02 K/UL (ref 0–0.12)
BUN SERPL-MCNC: 26 MG/DL (ref 8–22)
CALCIUM SERPL-MCNC: 9.1 MG/DL (ref 8.5–10.5)
CHLORIDE SERPL-SCNC: 99 MMOL/L (ref 96–112)
CO2 SERPL-SCNC: 21 MMOL/L (ref 20–33)
CREAT SERPL-MCNC: 1.08 MG/DL (ref 0.5–1.4)
EOSINOPHIL # BLD AUTO: 0.32 K/UL (ref 0–0.51)
EOSINOPHIL NFR BLD: 2.9 % (ref 0–6.9)
ERYTHROCYTE [DISTWIDTH] IN BLOOD BY AUTOMATED COUNT: 44.9 FL (ref 35.9–50)
GLUCOSE BLD-MCNC: 117 MG/DL (ref 65–99)
GLUCOSE SERPL-MCNC: 123 MG/DL (ref 65–99)
HCT VFR BLD AUTO: 30.2 % (ref 42–52)
HGB BLD-MCNC: 10 G/DL (ref 14–18)
IMM GRANULOCYTES # BLD AUTO: 0.04 K/UL (ref 0–0.11)
IMM GRANULOCYTES NFR BLD AUTO: 0.4 % (ref 0–0.9)
LYMPHOCYTES # BLD AUTO: 0.75 K/UL (ref 1–4.8)
LYMPHOCYTES NFR BLD: 6.7 % (ref 22–41)
MCH RBC QN AUTO: 26.9 PG (ref 27–33)
MCHC RBC AUTO-ENTMCNC: 33.1 G/DL (ref 33.7–35.3)
MCV RBC AUTO: 81.2 FL (ref 81.4–97.8)
MONOCYTES # BLD AUTO: 0.47 K/UL (ref 0–0.85)
MONOCYTES NFR BLD AUTO: 4.2 % (ref 0–13.4)
NEUTROPHILS # BLD AUTO: 9.58 K/UL (ref 1.82–7.42)
NEUTROPHILS NFR BLD: 85.6 % (ref 44–72)
NRBC # BLD AUTO: 0 K/UL
NRBC BLD-RTO: 0 /100 WBC
PLATELET # BLD AUTO: 299 K/UL (ref 164–446)
PMV BLD AUTO: 9.1 FL (ref 9–12.9)
POTASSIUM SERPL-SCNC: 3.6 MMOL/L (ref 3.6–5.5)
RBC # BLD AUTO: 3.72 M/UL (ref 4.7–6.1)
SODIUM SERPL-SCNC: 128 MMOL/L (ref 135–145)
WBC # BLD AUTO: 11.2 K/UL (ref 4.8–10.8)

## 2019-01-25 PROCEDURE — 80048 BASIC METABOLIC PNL TOTAL CA: CPT

## 2019-01-25 PROCEDURE — A9270 NON-COVERED ITEM OR SERVICE: HCPCS | Performed by: NURSE PRACTITIONER

## 2019-01-25 PROCEDURE — 85025 COMPLETE CBC W/AUTO DIFF WBC: CPT

## 2019-01-25 PROCEDURE — 700102 HCHG RX REV CODE 250 W/ 637 OVERRIDE(OP): Performed by: NURSE PRACTITIONER

## 2019-01-25 PROCEDURE — 36415 COLL VENOUS BLD VENIPUNCTURE: CPT

## 2019-01-25 PROCEDURE — 82962 GLUCOSE BLOOD TEST: CPT

## 2019-01-25 PROCEDURE — 700111 HCHG RX REV CODE 636 W/ 250 OVERRIDE (IP): Performed by: NURSE PRACTITIONER

## 2019-01-25 RX ORDER — OXYCODONE HYDROCHLORIDE AND ACETAMINOPHEN 5; 325 MG/1; MG/1
1 TABLET ORAL EVERY 6 HOURS PRN
Qty: 10 TAB | Refills: 0 | Status: SHIPPED | OUTPATIENT
Start: 2019-01-25 | End: 2019-02-01

## 2019-01-25 RX ADMIN — IBUPROFEN 800 MG: 800 TABLET, FILM COATED ORAL at 07:51

## 2019-01-25 RX ADMIN — HEPARIN SODIUM 5000 UNITS: 5000 INJECTION, SOLUTION INTRAVENOUS; SUBCUTANEOUS at 05:10

## 2019-01-25 RX ADMIN — ACETAMINOPHEN 1000 MG: 500 TABLET ORAL at 05:10

## 2019-01-25 NOTE — PROGRESS NOTES
Patient alert and oriented x 4.  Patient had 2 medium bowel movements with dark red clots.  1  Small bowel movement with with dark red blood.  No complaints of pain.

## 2019-01-25 NOTE — DISCHARGE INSTRUCTIONS
Discharge Instructions    Discharged to home by car with relative. Discharged via wheelchair, hospital escort: Yes.  Special equipment needed: Not Applicable    Be sure to schedule a follow-up appointment with your primary care doctor or any specialists as instructed.     Discharge Plan:   Pneumococcal Vaccine Administered/Refused: Not given - Patient refused pneumococcal vaccine  Influenza Vaccine Indication: Patient Refuses    I understand that a diet low in cholesterol, fat, and sodium is recommended for good health. Unless I have been given specific instructions below for another diet, I accept this instruction as my diet prescription.   Other diet: Regular    Special Instructions:   Colectomy D/C instructions:     1. DIET: A low fiber diet is recommended.     The following foods are generally allowed on a low-fiber diet:     Enriched white bread or rolls without seeds   White rice, plain white pasta, noodles and macaroni   Crackers   Refined cereals such as Cream of Wheat   Pancakes or waffles made from white refined flour   Most canned or cooked fruits without skins, seeds or membranes   Fruit and vegetable juice with little or no pulp, fruit-flavored drinks and flavored husain   Canned or well-cooked vegetables without seeds, hulls or skins, such as carrots, potatoes and tomatoes   Tender meat, poultry and fish   Eggs   Tofu   Creamy peanut butter -- up to 2 tablespoons a day   Milk and foods made from milk, such as yogurt, pudding, ice cream, cheeses and sour cream -- up to 2 cups a day, including any used in cooking   Butter, margarine, oils and salad dressings without seeds   Desserts with no whole grains, seeds, nuts, raisins or coconut     You should avoid the following foods:     Whole-wheat or whole-grain breads, cereals and pasta   Brown or wild rice and other whole grains such as oats, kasha, barley, quinoa   Dried fruits and prune juice   Raw fruit, including those with seeds, skin or membranes,  such as berries   Raw or undercooked vegetables, including corn   Dried beans, peas and lentils   Seeds and nuts, and foods containing them   Coconut   Popcorn     If you're eating a low-fiber diet, a typical menu might look like this:     Breakfast:   1 glass milk   1 egg   1 slice of white toast with smooth jelly   1/2 cup canned peaches   Snack:   1 cup yogurt   Lunch:   1 to 2 cups of chicken noodle soup   Soda crackers   [place] of drained tuna with mayonnaise or salad dressing on white bread   Canned applesauce   Flavored water or iced tea     Snack:   White toast, bread or crackers   2 tablespoons creamy peanut butter   Flavored water     Dinner:   3 ounces lean meat, poultry or fish   1/2 cup white rice   1/2 cup cooked vegetables, such as carrots or green beans   1 enriched white dinner roll with butter   Hot tea     Prepare all foods so that they're tender. Good cooking methods include simmering, poaching, stewing, steaming and braising. Baking or microwaving in a covered dish is another option. Try to avoid roasting, broiling and grilling -- methods that tend to make foods dry and tough. You may also want to avoid fried foods and go easy on spices.   Keep in mind that you may have fewer bowel movements and smaller stools while you're following a low-fiber diet. To avoid constipation, you may need to drink extra fluids. Drink plenty of water unless your doctor tells you otherwise, and use juices and milk as noted.     2. ACTIVITIES: After discharge from the hospital, you may resume full routine activities as you feel up to them.  You have a 10 pound weight restriction for two weeks after surgery. This means no lifting anything heavier than a gallon of milk. Routine activities such as bathing, walking, going up and down stairs, and driving* (see below) are safe.     3. DRIVING: You may drive whenever you are off pain medications and are able to perform the activities needed to drive, i.e. turning, bending,  twisting, etc.     4. BATHING: no restrictions, unless you have a drain in place, in which case, showering is okay, but no baths or pools until after your first postoperative appointment.     5. PAIN MEDICATION: You will be given a prescription for pain medication at discharge. Please take these as directed. It is important to remember not to take medications on an empty stomach as this may cause nausea.     6. BOWEL FUNCTION: A few patients, after this operation, will develop either frequent or loose stools after meals. This usually corrects itself after a few days, to a few months.     7. APPOINTMENT: Contact our office at [phone] for a follow-up appointment in 1-2 weeks following your procedure.     8. CALL IF YOU HAVE: (1) Fevers to more than 101F, (2) Unusual chest or leg pain, (3) Drainage or fluid from incision that may be foul smelling, increased tenderness or soreness at the wound or the wound edges are no longer together, redness or swelling at the incision site. Please do not hesitate to call with any other questions.       If you have any additional questions, please do not hesitate to call the office and speak to either myself or the physician on call.     Wesly Willams MD PhD   [facility]   Colon and Rectal Surgeon     · Is patient discharged on Warfarin / Coumadin?   No   End Colostomy Reversal, Care After  Refer to this sheet in the next few weeks. These instructions provide you with information on caring for yourself after your procedure. Your health care provider may also give you more specific instructions. Your treatment has been planned according to current medical practices, but problems sometimes occur. Call your health care provider if you have any problems or questions after your procedure.  HOME CARE INSTRUCTIONS  · Change your bandages (dressings) as directed by your health care provider.  · Keep the wound clean and dry. The wound may be washed gently with soap and water.  Do not rub the wound. Gently pat the wound dry with a clean towel.  · Do not take baths, swim, or use hot tubs for 10 days, or as directed by your health care provider.  · Only take over-the-counter or prescription medicines for pain, discomfort, or fever as directed by your health care provider.  · You may resume a normal diet as directed by your health care provider.  · Do not lift more than 10 pounds (4.5 kg) or play contact sports for 4 weeks, or as directed by your health care provider.  · Use a stool softener if recommended by your health care provider, especially with pain medicine.  SEEK MEDICAL CARE IF:  · You have redness, swelling, or increasing pain in the wound.  · You notice pus coming from the wound.  · You have drainage from the wound lasting longer than 1 day.  · You notice a bad smell coming from the wound or dressing.  · Your wound breaks open.  · You have persistent nausea or vomiting.  · You cannot have a bowel movement.  · You have pain that is not controlled with medicine.  SEEK IMMEDIATE MEDICAL CARE IF:  · You have a fever.  · You have a rash.  · You have difficulty breathing.  · You have any reaction or side effects to your medicines.     This information is not intended to replace advice given to you by your health care provider. Make sure you discuss any questions you have with your health care provider.     Document Released: 03/11/2013 Document Revised: 05/03/2016 Document Reviewed: 03/11/2013  [employer] Interactive Patient Education ©2016 [employer]      Depression / Suicide Risk    As you are discharged from this [facility] facility, it is important to learn how to keep safe from harming yourself.    Recognize the warning signs:  · Abrupt changes in personality, positive or negative- including increase in energy   · Giving away possessions  · Change in eating patterns- significant weight changes-  positive or negative  · Change in sleeping patterns- unable to sleep or sleeping  all the time   · Unwillingness or inability to communicate  · Depression  · Unusual sadness, discouragement and loneliness  · Talk of wanting to die  · Neglect of personal appearance   · Rebelliousness- reckless behavior  · Withdrawal from people/activities they love  · Confusion- inability to concentrate     If you or a loved one observes any of these behaviors or has concerns about self-harm, here's what you can do:  · Talk about it- your feelings and reasons for harming yourself  · Remove any means that you might use to hurt yourself (examples: pills, rope, extension cords, firearm)  · Get professional help from the community (Mental Health, Substance Abuse, psychological counseling)  · Do not be alone:Call your Safe Contact- someone whom you trust who will be there for you.  · Call your local CRISIS HOTLINE 538-4434 or 193-734-9807  · Call your local Children's Mobile Crisis Response Team [place] (593) 523-0197 or www.[employer]  · Call the toll free National Suicide Prevention Hotlines   · National Suicide Prevention Lifeline 367-350-LOVA (2646)  · National Hope Line Network 800-SUICIDE (038-7268)

## 2019-01-25 NOTE — PROGRESS NOTES
Bedside report received.  Assessment complete.  A&O x 4. Patient calls appropriately.  Patient up with stand by  assist.  Patient declines pain medications at this time.   Denies N&V. Tolerating DM, GI soft diet.  Surgical lap sites to abdomen x4, LLQ incision, dressing in place, CDI.  + void, + flatus, last BM 1/25  Patient denies SOB.  Review plan with of care with patient. Call light and personal belongings with in reach. Hourly rounding in place. All needs met at this time

## 2019-01-25 NOTE — CARE PLAN
Problem: Safety  Goal: Will remain free from falls  Outcome: PROGRESSING AS EXPECTED  Patient calls appropriately. Call light within reach. Bed in lowest and locked position.    Problem: Infection  Goal: Will remain free from infection  No sign or symptoms of infection noted to surgical site.

## 2019-01-25 NOTE — DISCHARGE SUMMARY
Discharge Summary    CHIEF COMPLAINT ON ADMISSION  No chief complaint on file.      Reason for Admission  DIVERTICULAR DISEASE      Admission Date  1/22/2019    CODE STATUS  Full Code    HPI & HOSPITAL COURSE  This is a 68 y.o. male here with colostomy and elects surgical resection. The patient post operative coarse was essentially unremarkable.  At the time of discharge he/she was afebrile, tolerating a regular diet and voiding normally.  His general exam is unremarkable.  His abdominal incisions are healing satisfactorily.  Patient has been counseled on normal expectations of an uncomplicated post operative coarse.  He was discharged on a regular diet.  He has restarted her pre-admission medications.  His discharge medications are as below.   He is to follow up with Dr. Willams in one to two weeks and prn.  Discharge instructions were given. Precautions and limitations were reviewed.     The patient was counseled regarding the benefits of narcotics for post-operative pain in the short term period. The patient was made aware of the alternatives, including heating pads, ice, and NSAID medication.    The risks of addiction, overdose, respiratory depression, and risks during pregnancy (if applicable) along with warning signs of addiction, were discussed.  We discussed taking the medications as prescribed and how they can interact with other medications the patient is currently taking.     The patient was notified not to share the medications with others and understands that these medications are intended to be used only in the short term for post-operative pain.    I reviewed the [employer]  program, and the patient was deemed not to be at high risk for abuse, and had no concurrent prescriptions.    The patient was given a chance to ask questions, and all questions were answered. Discussion was undertaken with Layman's terms. The patient demonstrated adequate understanding. Consent was given and signed preoperatively in  clear state of mind.    The patient has stated understanding and agrees with this plan of care.                 Pathological exam:             A. Hernia sac:         Benign fibromembranous tissue and fat. Gross examination only.  B. Colostomy end:         Benign segment of colon with an adjoining segment of skin          consistent with an ostomy site. The colonic mucosa demonstrates          focal mucosal hemorrhage adjacent to the skin.         No malignancy is seen.  C. Rectal stump:         Benign segment of colon/rectum demonstrating an intact colonic          mucosa and muscle wall. One margin is contained within severe          adhesions and roughened tissue having numerous indwelling          staples. There is focal mild chronic inflammation and a few          foreign body giant cells within the submucosal fibrous tissue.         Two benign anastomotic donuts demonstrating focal submucosal          edema and hemorrhage.         No malignancy is seen.       Therefore, he is discharged in good and stable condition to home with close outpatient follow-up.      Discharge Date  1/25/2019      FOLLOW UP ITEMS POST DISCHARGE  None    DISCHARGE DIAGNOSES  Attention to colostomy (HCC)- (present on admission)   Assessment & Plan     1/22/19 S/P robotic assisted laparoscopic colostomy reversal, splenic flexure mobilization         FOLLOW UP  No future appointments.  Wesly Willams M.D.  [id number]  [address]  [facility] [address]  [phone]    In 1 week  and prn      MEDICATIONS ON DISCHARGE     Medication List      START taking these medications      Instructions   oxyCODONE-acetaminophen 5-325 MG Tabs  Commonly known as:  PERCOCET   Take 1 Tab by mouth every 6 hours as needed for up to 7 days.  Dose:  1 Tab        CONTINUE taking these medications      Instructions   lisinopril 10 MG Tabs  Commonly known as:  PRINIVIL   Take 1 Tab by mouth every day.  Dose:  10 mg     metFORMIN  MG Tb24  Commonly known  as:  GLUCOPHAGE XR   Take 1 Tab by mouth every day.  Dose:  500 mg     vitamin D (Ergocalciferol) 50091 units Caps capsule  Commonly known as:  DRISDOL   Take 50,000 Units by mouth every 7 days.  Dose:  16171 Units            Allergies  No Known Allergies    DIET  Orders Placed This Encounter   Procedures   • Diet Order Low Fiber(GI Soft), Diabetic     Standing Status:   Standing     Number of Occurrences:   1     Order Specific Question:   Diet:     Answer:   Low Fiber(GI Soft) [2]     Order Specific Question:   Diet:     Answer:   Diabetic [3]     Order Specific Question:   ERAS     Answer:   Yes     Order Specific Question:   Miscellaneous modifications:     Answer:   6 Small Meals [4]       ACTIVITY  As tolerated.  Weight bearing as tolerated    CONSULTATIONS  None    PROCEDURES  robotic assisted laparoscopic colostomy reversal, splenic flexure mobilization    LABORATORY  Lab Results   Component Value Date    SODIUM 128 (L) 01/25/2019    POTASSIUM 3.6 01/25/2019    CHLORIDE 99 01/25/2019    CO2 21 01/25/2019    GLUCOSE 123 (H) 01/25/2019    BUN 26 (H) 01/25/2019    CREATININE 1.08 01/25/2019        Lab Results   Component Value Date    WBC 11.2 (H) 01/25/2019    HEMOGLOBIN 10.0 (L) 01/25/2019    HEMATOCRIT 30.2 (L) 01/25/2019    PLATELETCT 299 01/25/2019        Total time of the discharge process exceeds 32 minutes.

## 2019-01-25 NOTE — PROGRESS NOTES
"Surgical Progress Note:    POD #3 S/P robotic assisted laparoscopic colostomy reversal, splenic flexure mobilization  Doing well. Neg N/V, + FLATUS/ + BM, Pain controlled, Denies chest pain or SOB.  Ambulating. Tolerating PO.  Ready for d/c    PE:  /87   Pulse 74   Temp 36.8 °C (98.3 °F) (Temporal)   Resp 18   Ht 1.753 m (5' 9\")   Wt 114.8 kg (253 lb 1.4 oz)   SpO2 93%   BMI 37.37 kg/m²     I/O:   Intake/Output Summary (Last 24 hours) at 01/23/19 1009  Last data filed at 01/22/19 2310   Gross per 24 hour   Intake              966 ml   Output              850 ml   Net              116 ml         Review of Systems   Constitutional: Negative.  Negative for chills and fever.   Respiratory: Negative for shortness of breath.    Cardiovascular: Negative for chest pain.   Gastrointestinal: Negative for nausea and vomiting.        +flatus/+ stool   Genitourinary: Negative.      Physical Exam   Constitutional:  appears well-developed.   Neck: Neck supple.   Cardiovascular: Normal rate.    Pulmonary/Chest: Effort normal.   Abdominal: Soft.  exhibits no distension. Appropriate tenderness.   Incisions clean, dry, and intact    Musculoskeletal: Normal range of motion.   Neurological:  alert.   Skin:  Warm and dry.   Extremities: polo, no edema    Labs:  Recent Labs      01/23/19   0245  01/24/19   0314  01/25/19   0337   WBC  10.9*  11.6*  11.2*   RBC  4.12*  3.75*  3.72*   HEMOGLOBIN  10.8*  10.0*  10.0*   HEMATOCRIT  34.2*  31.1*  30.2*   MCV  83.0  82.9  81.2*   MCH  26.2*  26.7*  26.9*   RDW  45.5  46.4  44.9   PLATELETCT  306  285  299   MPV  8.7*  8.9*  9.1   NEUTSPOLYS  82.60*  77.90*  85.60*   LYMPHOCYTES  9.00*  8.60*  6.70*   MONOCYTES  6.00  5.70  4.20   EOSINOPHILS  1.60  6.90  2.90   BASOPHILS  0.40  0.40  0.20     Recent Labs      01/24/19   0314  01/24/19   1334  01/25/19   0337   SODIUM  126*  124*  128*   POTASSIUM  3.6  3.7  3.6   CHLORIDE  95*  96  99   CO2  24  20  21   GLUCOSE  102*  129*  123* "   BUN  38*  30*  26*         A/P:     - Regular low residue diet as tolerated.  - IS Q One hour while awake  - Ambulate.  Out of bed for all meals please  - Pain controlled.  Cont PO pain medication  - Labs noted, improved  - DVT propholaxis, ok to cont Heparin, sequentials and ambulate  - Adequate urine output.  Cont, to monitor  - Will D/C home.. Discharge instructions given.  Precautions and limitations reviewed.  Pt stated understanding and agrees with this plan of care.  F/U in one week and prn.     Attention to colostomy (HCC)- (present on admission)   Assessment & Plan     1/22/19 S/P robotic assisted laparoscopic colostomy reversal, splenic flexure mobilization         Discussed with Patient, RN and Dr. Imer Bell, A.P.N.  [facility]  [phone]

## 2019-01-25 NOTE — CARE PLAN
Problem: Safety  Goal: Will remain free from injury  Outcome: PROGRESSING AS EXPECTED  Patient free from accidental injury at this time. Safety precautions in place. Hourly rounding in place.      Problem: Pain Management  Goal: Pain level will decrease to patient's comfort goal  Outcome: PROGRESSING AS EXPECTED  Patient declining pain medication at this time. Patient encouraged to call if pain worsens.Hourly rounding in place.

## 2019-01-25 NOTE — PROGRESS NOTES
Patient discharged to home with Denise ( from [facility]). All lines removed. Discharge instructions and prescription reviewed and understanding verbalized. Patient states they will fill prescriptions. Patient states they will return to emergency if discussed symptoms arise. Patient left walking with Denise. Medications from drawer removed and sent back to pharmacy or disposed of per protocol. Chart given to unit clerk.

## 2019-01-28 NOTE — OP REPORT
Endoscopy Report    Date: 1/21/2019  Surgeon: Wesly Willams    Sedation: Versed with fentanyl    Pre-operative Diagnosis:colostomy with Burnham's pouch    Post-operative Diagnosis:same    Procedure:   Colostomy through stoma  Flexible sigmoidoscopy    Indications: 68-year-old male with a history of perforated diverticulitis and underwent a Burnham's procedure and now desires reversal.  He has not had a colonoscopy.  Findings:4 mm transverse colon polyp biopsied in resected and retrieved completely.    Procedure in detail: This is an average risk patient with  no previous colonoscopy. Patient is 68 years old and underwent sigmoid colon resection with creation of an end colostomy and now desires reversal. The procedure, indications, preparation and potential complications were explained in detail to the patient, who indicated understanding and Signed the corresponding consent forms. Moderate sedation was administered by a registered nurse with continuous pulse oximetry and blood pressure monitoring throughout the procedure. Supplemental oxygen was administered. The quality of the preparation was fair likely inadequate to identify less than 5 mm polyps. Patient was placed in left lateral decubitus position. Digital examination revealed no abnormalities. The flexible colonoscope was introduced through the rectum and advanced under direct visualization until the staple line was reached. The endoscope was then gradually withdrawn and there was mild disuse proctitis noted.  The patient was then transferred to his back and the endoscope inserted through the colostomy.  He was advanced until the cecum was reached.  Visualization of the cecum was adequate. The cecal sling folds were seen. The appendiceal orifice and ileocecal valve were identified. The scope was not retroflexed in the cecum. The scope was then slowly withdrawn and a 4 mm transverse colon polyp was noted and removed with cold biopsy forceps.  Patient  toleration of the procedure was excellent. The procedure was not difficult. There was no other pathology visualized. Patient tolerated the procedure well without complication and was taken to the postanesthesia care unit in stable condition.    Wesly Willams MD PhD  [facility]  Colon and Rectal Surgeon  [phone]

## 2019-02-08 NOTE — ADDENDUM NOTE
Encounter addended by: Laura Dunham R.N. on: 2/8/2019  1:08 PM<BR>    Actions taken: Visit Navigator Flowsheet section accepted

## 2019-02-15 NOTE — OP REPORT
Operative Report    Date: 1/22/2019    Surgeon: Wesly Willams M.D.    Assistant: Christie Bell    Anesthesiologist: Anibal Elaine DO    Pre-operative Diagnosis:   Colostomy in place  Parastomal hernia  Morbid obesity with BMI greater than 35  History of renal cell adenocarcinoma with unilateral nephrectomy  Hypertension      Post-operative Diagnosis: same     Procedure:   1)  ROBOTIC ASSISTED colostomy reversal   2) splenic flexure mobilization  3) parastomal hernia repair  4) partial proctectomy    Indications: 68-year-old male with a history of Burnham's procedure for perforated diverticulitis desires colostomy reversal.  Barium enema and flexible sigmoidoscopy revealed no contraindications to reversal.    An extensive PARQ conference was held with the patient and his family, in regard to treatment options for colostomy reversal. The patient was made aware of the alternatives, including operative and non-operative: Continued ostomy care. The risks of bleeding, infection, damage to surrounding structures, need for reoperation, hernia, fistula, leak, stroke, MI, and death were discussed with the patient. The patient was given a chance to ask questions, and all his questions were answered. The patient demonstrates adequate understanding, seems pleased with the plan, and wishes to proceed.    OPERATIVE FINDINGS:   Procedure in detail: After informed consent was obtained, the patient was   taken to the operating room, placed in supine position on a pink pad. The patient underwent general endotracheal anesthesia without incident.  The patient's arms were tucked and the chest and shoulders were padded and secured to the operating room table.   The patient was then moved to lithotomy in yellowfin stirrups with all skin and joint surfaces padded and protected appropriately.The rectum was washed out with Betadine and the abdomen was prepped and draped in a sterile fashion. A timeout was performed verifying the  correct patient, procedure, site, positioning in availability of equipment prior to the start of surgery.    Operation was begun by placing a 5 mm periumbilical incision through which a 5 mm trocar was introduced into the abdomen using the Optiview technique. After pneumoperitoneum was achieved, additional trocars were placed, 15 mm in the right lower quadrant and 8 mm in the left upper quadrant. An 8 and a 5 mm assistant port were placed and the camera port was upsized to an 8 mm trocar as well. All trocars were placed with 0.5% Marcaine without epinephrine for local anesthesia.    The patient was positioned in steep Trendelenburg and right lateral decubitus and before the da Wang robotic system was docked the patient was noted to be securely  positioned on the table.  We took down the left lateral attachments of the sigmoid colon, identifying the left ureter which was preserved throughout the remainder of the procedure.  We then opened the retroperitoneal space in the right side and dissected in   the bloodless plane, isolated the INOCENCIO pedicle away from the pelvic nerves and   ureters. The INOCENCIO was then divided near its origin with a robotic stapler with a vascular load. We then used electrocautery to mobilize the left colon up to the splenic flexure,  taking down omental attachments and adhesions and producing adequate length   to reach the pelvis for anastomosis.    Electrocautery was now used in the presacral space in the bloodless plane.   Dissection was carried down and left and right laterally, freeing up the   rectum. We chose a spot of healthy rectum, divided the mesorectum with a   vessel sealer at this level and then divided the rectum itself with a green   load robotic staple fire.    Indocyanine green immunofluorescent dye was given and the Firefly system used   to confirm excellent blood flow to the planned anastomotic site in the rectum   and descending colon.    We made an extraction incision in the  left lower quadrant, carried down with   electrocautery to the level of the fascia. The anterior fascia was opened,   the muscles retracted medially and the posterior fascia opened as well. Wound  retractor was secured into position and the specimen delivered out onto the   operative field. We divided the mesentery with 0 Vicryl ties between Wilson   clamps and then divided the colon itself with a Furness clamp. A   29 EEA anvil was loaded, tied down and washed with Betadine. This was reduced  back in the abdominal cavity. A peon clamp was then used  To make the wound retractor airtight and with pneumoperitoneum re-achieved, we inserted a 29 EEA stapler into the patient's rectum. Jesús was deployed and an end-to-end anastomosis carried out with hemostasis. Insufflation test was performed and noted to have no demonstrated air leak.  We observed hemostasis throughout the operative field, closed the 12 mm trocar site with an Endoclose device. Pneumoperitoneum was reduced and all trocars   were removed.    At this point numbers of the operating team changed into clean gown and gloves and all of the instruments used in the previous portions of the procedure were moved away from the operating field.  Instruments which had not been previously detached during  The case were now used  For the remainder of the procedure.  The extraction site was closed in 2 layers with running 0 PDS sutures in the posterior and anterior rectus sheaths.   Skin incision was closed with 4-0 Monocryl. Dermabond was   placed and the patient returned to the PACU in stable condition. All   instruments counts were correct at the end of the procedureThe patient was awakened from general anesthetic, and was taken to the recovery room in stable condition.    Specimen: Sigmoid colon and rectum    EBL: 100mL    UOP: See anesthetic record    Drains: None    Dispo: PACU    Wesly Willams MD PhD  [facility]  Colon and Rectal  Surgeon  [phone]

## 2019-02-21 NOTE — OP REPORT
Operative Report    Date: 1/22/2019    Surgeon: Wesly Willams M.D.    Assistant: Christie Bell    Anesthesiologist: Dr Elaine    Pre-operative Diagnosis:   Colostomy in place  Morbid obesity with BMI greater than 35  History of renal cell adenocarcinoma with unilateral nephrectomy  Hypertension    Post-operative Diagnosis: Same    Procedure:   1)  ROBOTIC ASSISTED Laparoscopic colostomy reversal with splenic flexure mobilization    Indications: 68-year-old male underwent emergent sigmoid colectomy for diverticular disease and had creation of an end colostomy with   Burnham's pouch.  He now desires reversal of his colostomy.    An extensive PARQ conference was held with the patient and his family, in regard to risks of low anterior resection syndrome. The patient was made aware of the alternatives, including operative and non-operative: continued ostomy care. The risks of bleeding, infection, damage to surrounding structures, need for reoperation, hernia, fistula, leak, stroke, MI, and death were discussed with the patient. The patient was given a chance to ask questions, and all his questions were answered. The patient demonstrates adequate understanding, seems pleased with the plan, and wishes to proceed.    OPERATIVE FINDINGS: the distal colon was anastomosed to the rectum in an end to end fashion with a negative air leak test.Splenic flexure was mobilized to avoid tension.    Procedure in detail: After informed consent was obtained, the patient was   taken to the operating room, placed in supine position on a pink pad. The patient underwent general endotracheal anesthesia without incident.  The patient's arms were tucked and the chest and shoulders were padded and secured to the operating room table.   The patient was then moved to lithotomy in yellowfin stirrups with all skin and joint surfaces padded and protected appropriately.The rectum was washed out with Betadine and the abdomen was prepped and  draped in a sterile fashion. A timeout was performed verifying the correct patient, procedure, site, positioning in availability of equipment prior to the start of surgery.    Operation was begun by placing a 5 mm periumbilical incision through which a 5 mm trocar was introduced into the abdomen using the Optiview technique. After pneumoperitoneum was achieved, additional trocars were placed, 15 mm in the right lower quadrant and 8 mm in the left upper quadrant. An 8 and a 5 mm assistant port were placed and the camera port was upsized to an 8 mm trocar as well. All trocars were placed with 0.5% Marcaine without epinephrine for local anesthesia.    The patient was positioned in steep Trendelenburg and right lateral decubitus and before the da Wang robotic system was docked the patient was noted to be securely  positioned on the table.  We took down the left lateral attachments of the sigmoid colon, identifying the left ureter which was preserved throughout the remainder of the procedure.  We then opened the retroperitoneal space in the right side and dissected in   the bloodless plane, to free the distal rectal stump.  We visualized the staple lineElectrocautery was now used in the presacral space in the bloodless plane. Dissection was carried down and left and right laterally, freeing up the rectum. We chose a spot of healthy rectum, divided the mesorectum with a vessel sealer at this level and then divided the rectum itself with a green load robotic staple fire.  We then used electrocautery to mobilize the left colon up to the splenic flexure,taking down omental attachments and all splenic flexure attachments and producing adequate length to reach the pelvis for anastomosis.      Indocyanine green immunofluorescent dye was given and the Firefly system used   to confirm excellent blood flow to the planned anastomotic site in the rectum   and descending colon.    We made an incision in the left lower quadrant,  carried down with   electrocautery to the level of the fascia.the colostomy was freed from all adhesions.  A Wound  retractor was secured into position and the specimen delivered out onto the   operative field. We divided the mesentery with 0 Vicryl ties between Wilson   clamps and then divided the colon itself with a Furness clamp.the colostomy and was sent as a specimen. A   29 EEA anvil was loaded, tied down and washed with Betadine. This was reduced  back in the abdominal cavity. A peon clamp was then used  To make the wound retractor airtight and with pneumoperitoneum re-achieved, we inserted a 29 EEA stapler into the patient's rectum. Jesús was deployed and an end-to-end anastomosis carried out with hemostasis. Insufflation test was performed and noted to have no demonstrated air leak.  We observed hemostasis throughout the operative field, closed the 12 mm trocar site with an Endoclose device. Pneumoperitoneum was reduced and all trocars   were removed.    At this point numbers of the operating team changed into clean gown and gloves and all of the instruments used in the previous portions of the procedure were moved away from the operating field.  Instruments which had not been previously detached during  The case were now used  For the remainder of the procedure. The parastomal hernia sac was removed and the hernia closed by 2 layers with running 0 PDS sutures in the posterior and anterior rectus sheaths.   Skin incision was closed with skin staples. Dry sterile dressings were placed and the patient returned to the PACU in stable condition. All   instruments counts were correct at the end of the procedureThe patient was awakened from general anesthetic, and was taken to the recovery room in stable condition.    Specimen:   Hernia sac  Colostomy and  Rectal stump  EBL: 150mL    UOP: See anesthetic record    Drains: None    Dispo: PACU    Wesly Willams MD PhD  [facility]  Colon and Rectal  Surgeon  [phone]

## 2019-03-08 NOTE — ADDENDUM NOTE
Encounter addended by: Wesly Willams M.D. on: 3/8/2019 12:10 PM<BR>    Actions taken: Sign clinical note

## 2019-03-14 NOTE — ADDENDUM NOTE
Encounter addended by: Wesly Willams M.D. on: 3/14/2019  8:35 AM<BR>    Actions taken: Delete clinical note

## 2019-08-23 ENCOUNTER — HOSPITAL ENCOUNTER (OUTPATIENT)
Dept: RADIOLOGY | Facility: MEDICAL CENTER | Age: 69
End: 2019-08-23

## 2019-08-23 ENCOUNTER — OFFICE VISIT (OUTPATIENT)
Dept: PULMONOLOGY | Facility: HOSPICE | Age: 69
End: 2019-08-23
Payer: MEDICARE

## 2019-08-23 VITALS
WEIGHT: 262 LBS | OXYGEN SATURATION: 92 % | SYSTOLIC BLOOD PRESSURE: 114 MMHG | DIASTOLIC BLOOD PRESSURE: 80 MMHG | BODY MASS INDEX: 38.8 KG/M2 | TEMPERATURE: 97.7 F | HEART RATE: 78 BPM | RESPIRATION RATE: 16 BRPM | HEIGHT: 69 IN

## 2019-08-23 DIAGNOSIS — R91.8 MASS OF LEFT LUNG: ICD-10-CM

## 2019-08-23 PROCEDURE — 99204 OFFICE O/P NEW MOD 45 MIN: CPT | Performed by: INTERNAL MEDICINE

## 2019-08-23 ASSESSMENT — ENCOUNTER SYMPTOMS
DIARRHEA: 0
SINUS PAIN: 0
ABDOMINAL PAIN: 0
SHORTNESS OF BREATH: 0
FALLS: 0
DOUBLE VISION: 0
EYE REDNESS: 0
CHILLS: 0
TREMORS: 0
PHOTOPHOBIA: 0
ORTHOPNEA: 0
WHEEZING: 0
COUGH: 0
HEMOPTYSIS: 1
MYALGIAS: 0
PALPITATIONS: 0
SPUTUM PRODUCTION: 0
STRIDOR: 0
HEADACHES: 0
SORE THROAT: 0
SPEECH CHANGE: 0
DIAPHORESIS: 0
NECK PAIN: 0
EYE DISCHARGE: 0
BLURRED VISION: 0
PND: 0
DEPRESSION: 0
HEARTBURN: 0
WEIGHT LOSS: 0
FOCAL WEAKNESS: 0
BACK PAIN: 0
EYE PAIN: 0
NAUSEA: 0
CLAUDICATION: 0
DIZZINESS: 0
VOMITING: 0
FEVER: 0
CONSTIPATION: 0
WEAKNESS: 0

## 2019-08-26 RX ORDER — SODIUM CHLORIDE 9 MG/ML
INJECTION, SOLUTION INTRAVENOUS CONTINUOUS
Status: CANCELLED | OUTPATIENT
Start: 2019-09-03

## 2019-09-03 ENCOUNTER — HOSPITAL ENCOUNTER (OUTPATIENT)
Facility: MEDICAL CENTER | Age: 69
End: 2019-09-03
Attending: INTERNAL MEDICINE | Admitting: INTERNAL MEDICINE
Payer: MEDICARE

## 2019-09-03 ENCOUNTER — APPOINTMENT (OUTPATIENT)
Dept: RADIOLOGY | Facility: MEDICAL CENTER | Age: 69
End: 2019-09-03
Attending: RADIOLOGY
Payer: MEDICARE

## 2019-09-03 ENCOUNTER — APPOINTMENT (OUTPATIENT)
Dept: RADIOLOGY | Facility: MEDICAL CENTER | Age: 69
End: 2019-09-03
Attending: INTERNAL MEDICINE
Payer: MEDICARE

## 2019-09-03 VITALS
HEIGHT: 69 IN | BODY MASS INDEX: 38.69 KG/M2 | WEIGHT: 261.25 LBS | HEART RATE: 71 BPM | DIASTOLIC BLOOD PRESSURE: 89 MMHG | TEMPERATURE: 97.8 F | OXYGEN SATURATION: 92 % | RESPIRATION RATE: 16 BRPM | SYSTOLIC BLOOD PRESSURE: 141 MMHG

## 2019-09-03 DIAGNOSIS — R91.8 MASS OF LEFT LUNG: ICD-10-CM

## 2019-09-03 LAB
ERYTHROCYTE [DISTWIDTH] IN BLOOD BY AUTOMATED COUNT: 48 FL (ref 35.9–50)
HCT VFR BLD AUTO: 34.6 % (ref 42–52)
HGB BLD-MCNC: 10.2 G/DL (ref 14–18)
INR PPP: 1.11 (ref 0.87–1.13)
MCH RBC QN AUTO: 23.3 PG (ref 27–33)
MCHC RBC AUTO-ENTMCNC: 29.5 G/DL (ref 33.7–35.3)
MCV RBC AUTO: 79 FL (ref 81.4–97.8)
PATHOLOGY CONSULT NOTE: NORMAL
PLATELET # BLD AUTO: 354 K/UL (ref 164–446)
PMV BLD AUTO: 8.1 FL (ref 9–12.9)
PROTHROMBIN TIME: 14.6 SEC (ref 12–14.6)
RBC # BLD AUTO: 4.38 M/UL (ref 4.7–6.1)
WBC # BLD AUTO: 5.5 K/UL (ref 4.8–10.8)

## 2019-09-03 PROCEDURE — 88368 INSITU HYBRIDIZATION MANUAL: CPT

## 2019-09-03 PROCEDURE — 160002 HCHG RECOVERY MINUTES (STAT)

## 2019-09-03 PROCEDURE — 88305 TISSUE EXAM BY PATHOLOGIST: CPT

## 2019-09-03 PROCEDURE — 88341 IMHCHEM/IMCYTCHM EA ADD ANTB: CPT | Mod: 91

## 2019-09-03 PROCEDURE — 700105 HCHG RX REV CODE 258: Performed by: NURSE PRACTITIONER

## 2019-09-03 PROCEDURE — 88369 M/PHMTRC ALYSISHQUANT/SEMIQ: CPT

## 2019-09-03 PROCEDURE — 32405 CT-BIOPSY-LUNG/MEDIASTINUM: CPT | Mod: LT

## 2019-09-03 PROCEDURE — 71045 X-RAY EXAM CHEST 1 VIEW: CPT

## 2019-09-03 PROCEDURE — 85610 PROTHROMBIN TIME: CPT

## 2019-09-03 PROCEDURE — 700111 HCHG RX REV CODE 636 W/ 250 OVERRIDE (IP)

## 2019-09-03 PROCEDURE — 85027 COMPLETE CBC AUTOMATED: CPT

## 2019-09-03 PROCEDURE — 88342 IMHCHEM/IMCYTCHM 1ST ANTB: CPT

## 2019-09-03 RX ORDER — OXYCODONE HYDROCHLORIDE 10 MG/1
10 TABLET ORAL
Status: DISCONTINUED | OUTPATIENT
Start: 2019-09-03 | End: 2019-09-03 | Stop reason: HOSPADM

## 2019-09-03 RX ORDER — ONDANSETRON 2 MG/ML
4 INJECTION INTRAMUSCULAR; INTRAVENOUS EVERY 8 HOURS PRN
Status: DISCONTINUED | OUTPATIENT
Start: 2019-09-03 | End: 2019-09-03 | Stop reason: HOSPADM

## 2019-09-03 RX ORDER — OXYCODONE HYDROCHLORIDE 5 MG/1
5 TABLET ORAL
Status: DISCONTINUED | OUTPATIENT
Start: 2019-09-03 | End: 2019-09-03 | Stop reason: HOSPADM

## 2019-09-03 RX ORDER — HYDROMORPHONE HYDROCHLORIDE 2 MG/ML
0.5 INJECTION, SOLUTION INTRAMUSCULAR; INTRAVENOUS; SUBCUTANEOUS
Status: DISCONTINUED | OUTPATIENT
Start: 2019-09-03 | End: 2019-09-03 | Stop reason: HOSPADM

## 2019-09-03 RX ORDER — MIDAZOLAM HYDROCHLORIDE 1 MG/ML
INJECTION INTRAMUSCULAR; INTRAVENOUS
Status: COMPLETED
Start: 2019-09-03 | End: 2019-09-03

## 2019-09-03 RX ORDER — SODIUM CHLORIDE 9 MG/ML
INJECTION, SOLUTION INTRAVENOUS CONTINUOUS
Status: DISCONTINUED | OUTPATIENT
Start: 2019-09-03 | End: 2019-09-03 | Stop reason: HOSPADM

## 2019-09-03 RX ORDER — SODIUM CHLORIDE 9 MG/ML
500 INJECTION, SOLUTION INTRAVENOUS
Status: ACTIVE | OUTPATIENT
Start: 2019-09-03 | End: 2019-09-03

## 2019-09-03 RX ORDER — ACETAMINOPHEN 500 MG
1000 TABLET ORAL EVERY 6 HOURS
Status: DISCONTINUED | OUTPATIENT
Start: 2019-09-03 | End: 2019-09-03 | Stop reason: HOSPADM

## 2019-09-03 RX ORDER — ONDANSETRON 2 MG/ML
4 INJECTION INTRAMUSCULAR; INTRAVENOUS PRN
Status: ACTIVE | OUTPATIENT
Start: 2019-09-03 | End: 2019-09-03

## 2019-09-03 RX ORDER — MIDAZOLAM HYDROCHLORIDE 1 MG/ML
.5-2 INJECTION INTRAMUSCULAR; INTRAVENOUS PRN
Status: ACTIVE | OUTPATIENT
Start: 2019-09-03 | End: 2019-09-03

## 2019-09-03 RX ADMIN — FENTANYL CITRATE 50 MCG: 50 INJECTION, SOLUTION INTRAMUSCULAR; INTRAVENOUS at 10:04

## 2019-09-03 RX ADMIN — MIDAZOLAM HYDROCHLORIDE 1 MG: 1 INJECTION INTRAMUSCULAR; INTRAVENOUS at 10:01

## 2019-09-03 RX ADMIN — MIDAZOLAM HYDROCHLORIDE 1 MG: 1 INJECTION, SOLUTION INTRAMUSCULAR; INTRAVENOUS at 10:01

## 2019-09-03 RX ADMIN — SODIUM CHLORIDE: 9 INJECTION, SOLUTION INTRAVENOUS at 10:00

## 2019-09-03 NOTE — DISCHARGE INSTRUCTIONS
ACTIVITY: Rest and take it easy for the first 24 hours.  A responsible adult is recommended to remain with you during that time.  It is normal to feel sleepy.  We encourage you to not do anything that requires balance, judgment or coordination.    MILD FLU-LIKE SYMPTOMS ARE NORMAL. YOU MAY EXPERIENCE GENERALIZED MUSCLE ACHES, THROAT IRRITATION, HEADACHE AND/OR SOME NAUSEA.    FOR 24 HOURS DO NOT:  Drive, operate machinery or run household appliances.  Drink beer or alcoholic beverages.   Make important decisions or sign legal documents.    SPECIAL INSTRUCTIONS: keep incision dry for 24 hours    Discharge Instructions Needle Biopsy: Lung  You had a procedure called a needle biopsy of one of your lungs. In this procedure, a hollow needle is used to take one or more samples of your lung tissue. The tissue is then examined under a microscope. There are several different types of needle biopsies. Two types are:  · Fine needle aspiration. A small amount of tissue is withdrawn (aspirated) using a very fine needle.  · Core biopsy. A larger tissue sample is removed for examination.  A biopsy needle is inserted through your skin into your chest and lung. This is called a transthoracic approach, which means across or through the chest (thorax). Scans are done at the same time so that your provider can find the area where he or she would like to sample tissue. Needle biopsies do not require cuts or incisions into the body like open biopsies.  Your healthcare provider will use the results of your biopsy to help diagnose your condition.  Home care  · The site of the biopsy may feel numb for a while if you received numbing medicine.  · You might have a little soreness following the needle biopsy.  · Follow your healthcare provider's instructions about removing bandages and showering or bathing.  · You may be sleepy after the biopsy if you received medicine to help you relax (sedation). You should not drive until the next day or  as instructed by your healthcare provider.  · You should not do heavy lifting, a lot of stair climbing, vigorous exercise, or take part in sports the day of your biopsy. You can get back to your regular activities as instructed by your healthcare provider.    Follow-up care  Follow up with your healthcare provider, or as advised. Be sure you make an appointment with your healthcare provider to discuss the biopsy results.  Call your healthcare provider right away if any of these occur:  · Infection. You might have redness, pain, swelling, or drainage at the site of your biopsies.  · Bleeding. You might also have bleeding at the site of your biopsies.  · Coughing up blood. This may only be a small amount.  · Collapsed lung (pneumothorax). This means that air from your lungs leaks out into the spaces between your lungs and chest wall. It can lead to trouble breathing and a collapsed lung. Watch for trouble breathing, a fast pulse, sharp pains in your chest or shoulder, and bluish skin    © 3952-0916 [employer]. [address] [place] [address]. All rights reserved. This information is not intended as a substitute for professional medical care. Always follow your healthcare professional's instructions.     DIET: To avoid nausea, slowly advance diet as tolerated, avoiding spicy or greasy foods for the first day.  Add more substantial food to your diet according to your physician's instructions.  Babies can be fed formula or breast milk as soon as they are hungry.  INCREASE FLUIDS AND FIBER TO AVOID CONSTIPATION.    SURGICAL DRESSING/BATHING: do not shower for 24 hours, may shower tomorrow 9/4/2019 after 10am    FOLLOW-UP APPOINTMENT:  A follow-up appointment should be arranged with your doctor Casper at [phone]; call to schedule.    You should CALL YOUR PHYSICIAN if you develop:  Fever greater than 101 degrees F.  Pain not relieved by medication, or persistent nausea or vomiting.  Excessive  bleeding (blood soaking through dressing) or unexpected drainage from the wound.  Extreme redness or swelling around the incision site, drainage of pus or foul smelling drainage.  Inability to urinate or empty your bladder within 8 hours.  Problems with breathing or chest pain.    You should call [phone] if you develop problems with breathing or chest pain.  If you are unable to contact your doctor or surgical center, you should go to the nearest emergency room or urgent care center.  Physician's telephone #: [phone]    If any questions arise, call your doctor.  If your doctor is not available, please feel free to call the Surgical Center at [phone].  The Center is open Monday through Friday from 7AM to 7PM.  You can also call the [facility] HOTLINE open 24 hours/day, 7 days/week and speak to a nurse at [phone], or toll free at [phone].    A registered nurse may call you a few days after your surgery to see how you are doing after your procedure.    MEDICATIONS: Resume taking daily medication.  Take prescribed pain medication with food.  If no medication is prescribed, you may take non-aspirin pain medication if needed.  PAIN MEDICATION CAN BE VERY CONSTIPATING.  Take a stool softener or laxative such as senokot, pericolace, or milk of magnesia if needed.    If your physician has prescribed pain medication that includes Acetaminophen (Tylenol), do not take additional Acetaminophen (Tylenol) while taking the prescribed medication.    Depression / Suicide Risk    As you are discharged from this [facility] facility, it is important to learn how to keep safe from harming yourself.    Recognize the warning signs:  · Abrupt changes in personality, positive or negative- including increase in energy   · Giving away possessions  · Change in eating patterns- significant weight changes-  positive or negative  · Change in sleeping patterns- unable to sleep or sleeping all the time   · Unwillingness or  inability to communicate  · Depression  · Unusual sadness, discouragement and loneliness  · Talk of wanting to die  · Neglect of personal appearance   · Rebelliousness- reckless behavior  · Withdrawal from people/activities they love  · Confusion- inability to concentrate     If you or a loved one observes any of these behaviors or has concerns about self-harm, here's what you can do:  · Talk about it- your feelings and reasons for harming yourself  · Remove any means that you might use to hurt yourself (examples: pills, rope, extension cords, firearm)  · Get professional help from the community (Mental Health, Substance Abuse, psychological counseling)  · Do not be alone:Call your Safe Contact- someone whom you trust who will be there for you.  · Call your local CRISIS HOTLINE 230-4684 or 760-597-3235  · Call your local Children's Mobile Crisis Response Team [place] (620) 554-1269 or www.[employer]  · Call the toll free National Suicide Prevention Hotlines   · National Suicide Prevention Lifeline 159-281-RWQL (2844)  · National Hope Line Network 800-SUICIDE (809-2695)

## 2019-09-03 NOTE — PROGRESS NOTES
IR Note    Site Marked and Procedure Confirmed with MD, patient and RN pre procedure. Patient assisted to the table in CT4 in the supine position and draped in sterile fashion.     CT Guided Left Lung Mass Biopsy by MD Santiago assisted by RT Renard,Left Anterior Chest access site.     End Tidal CO2 range 27-34 during procedure.  ?  Cores X 3 in formalin collected by Dr. Santiago, specimen sent to Pathology, delivered to lab by RT Renard.  ?  Left Anterior Chest access site CDI and soft with gauze, and Tegaderm dressing in place.     Patient tolerated procedure, hemodynamically stable; pt awake and alert post procedure; report given to ROBERT Clark; patient transported to [facility] via IR RN monitored then transferred care to report RN.

## 2019-09-03 NOTE — OR NURSING
1029 Pt report received from IR nurse, pt transported over on [facility] with RN. Pt placed on monitor and left anterior chest incision site reviewed with IR nurse site is clean, dry and soft, no S/S of bleeding. Instructed pt that there will be a follow up chest x-ray, if clear and no pneumothorax then pt may ave something to eat and drink.      1308 follow up chest x-ray done    1340 results of chest X-ray showed no pneumothorax, pt was given water and a snack.     1435 second follow chest X-ray done    1450 second follow up X-ray showed no pneumothorax. Pt ok to be discharged home.     1515 Pt given discharge instructions, pt verbalized understanding. Pt taken out in a wheelchair, going home with medical transport.

## 2019-09-03 NOTE — OR SURGEON
Immediate Post- Operative Note        PostOp Diagnosis: LEFT LUNG MASS      Procedure(s): CT LEFT LOWER LUNG MASS IOPSY    Estimated Blood Loss: Less than 5 ml        Complications: None            9/3/2019     10:51 AM     Kenan Santiago

## 2019-09-06 ENCOUNTER — TELEPHONE (OUTPATIENT)
Dept: PULMONOLOGY | Facility: HOSPICE | Age: 69
End: 2019-09-06

## 2019-09-06 DIAGNOSIS — C64.9 CARCINOMA OF KIDNEY, UNSPECIFIED LATERALITY (HCC): ICD-10-CM

## 2019-09-06 DIAGNOSIS — R91.8 MASS OF LEFT LUNG: ICD-10-CM

## 2019-09-06 NOTE — PROGRESS NOTES
Reviewed CT-guided lung bx results.  Pathology is c/w metastatic clear cell renal carcinoma c/w his hx of renal carcinoma.  I have attempted to the contact the pt to review results.  It appears a referral was placed with cancer care specialists with no diagnosis.  I placed referral urgently with diagnosis. We will try to contact patient again.

## 2019-09-06 NOTE — Clinical Note
In case he calls back today, I left a vm that I had results that needed to be discussed; placed urgent oncology consult in the meantime.

## 2019-09-06 NOTE — TELEPHONE ENCOUNTER
----- Message from Clarissa Shirley M.D. sent at 9/6/2019 10:53 AM PDT -----  In case he calls back today, I left a vm that I had results that needed to be discussed; placed urgent oncology consult in the meantime.

## 2019-09-09 ENCOUNTER — TELEPHONE (OUTPATIENT)
Dept: PULMONOLOGY | Facility: HOSPICE | Age: 69
End: 2019-09-09

## 2019-09-09 NOTE — TELEPHONE ENCOUNTER
Spoke with patient regarding biopsy results and need to get re-established with oncology.  Urgent referral placed on Friday. Pt aware to expect call. Occ'l cough, no recurrent hemoptysis, no SOB.

## 2019-09-09 NOTE — TELEPHONE ENCOUNTER
Referral is pending. Per documentation patient was contacted and left voice message.    Phone Call 09/06/2019  1:58 PM Dorothea Rhodes - -   Note    Called pt, left message for call back.  Please send letter

## 2019-09-09 NOTE — TELEPHONE ENCOUNTER
Clarissa Shirley M.D.  You 3 minutes ago (3:15 PM)      Can we just f/u on referral?  I spoke to pt.    Routing comment

## 2019-09-24 ENCOUNTER — HOSPITAL ENCOUNTER (OUTPATIENT)
Dept: RADIOLOGY | Facility: MEDICAL CENTER | Age: 69
End: 2019-09-24
Attending: INTERNAL MEDICINE
Payer: MEDICARE

## 2019-09-24 DIAGNOSIS — C34.92 SQUAMOUS CARCINOMA OF LUNG, LEFT (HCC): ICD-10-CM

## 2019-09-24 PROCEDURE — 71260 CT THORAX DX C+: CPT

## 2019-09-24 PROCEDURE — 700117 HCHG RX CONTRAST REV CODE 255: Performed by: INTERNAL MEDICINE

## 2019-09-24 RX ADMIN — IOHEXOL 75 ML: 350 INJECTION, SOLUTION INTRAVENOUS at 10:05

## 2019-09-24 RX ADMIN — IOHEXOL 50 ML: 240 INJECTION, SOLUTION INTRATHECAL; INTRAVASCULAR; INTRAVENOUS; ORAL at 10:05

## 2019-10-31 ENCOUNTER — HOSPITAL ENCOUNTER (OUTPATIENT)
Dept: LAB | Facility: MEDICAL CENTER | Age: 69
End: 2019-10-31
Payer: MEDICARE

## 2019-10-31 LAB
ALBUMIN SERPL BCP-MCNC: 3.5 G/DL (ref 3.2–4.9)
ALBUMIN/GLOB SERPL: 0.7 G/DL
ALP SERPL-CCNC: 83 U/L (ref 30–99)
ALT SERPL-CCNC: 12 U/L (ref 2–50)
ANION GAP SERPL CALC-SCNC: 9 MMOL/L (ref 0–11.9)
AST SERPL-CCNC: 17 U/L (ref 12–45)
BILIRUB SERPL-MCNC: 0.8 MG/DL (ref 0.1–1.5)
BUN SERPL-MCNC: 10 MG/DL (ref 8–22)
CALCIUM SERPL-MCNC: 8.9 MG/DL (ref 8.5–10.5)
CHLORIDE SERPL-SCNC: 102 MMOL/L (ref 96–112)
CO2 SERPL-SCNC: 24 MMOL/L (ref 20–33)
CREAT SERPL-MCNC: 0.84 MG/DL (ref 0.5–1.4)
GLOBULIN SER CALC-MCNC: 5 G/DL (ref 1.9–3.5)
GLUCOSE SERPL-MCNC: 112 MG/DL (ref 65–99)
POTASSIUM SERPL-SCNC: 4 MMOL/L (ref 3.6–5.5)
PROT SERPL-MCNC: 8.5 G/DL (ref 6–8.2)
SODIUM SERPL-SCNC: 135 MMOL/L (ref 135–145)
TSH SERPL DL<=0.005 MIU/L-ACNC: 5.51 UIU/ML (ref 0.38–5.33)

## 2019-10-31 PROCEDURE — 84443 ASSAY THYROID STIM HORMONE: CPT

## 2019-10-31 PROCEDURE — 80053 COMPREHEN METABOLIC PANEL: CPT

## 2019-10-31 RX ORDER — 0.9 % SODIUM CHLORIDE 0.9 %
VIAL (ML) INJECTION PRN
Status: CANCELLED | OUTPATIENT
Start: 2019-10-31

## 2019-10-31 RX ORDER — ONDANSETRON 8 MG/1
8 TABLET, ORALLY DISINTEGRATING ORAL PRN
Status: CANCELLED | OUTPATIENT
Start: 2019-10-31

## 2019-10-31 RX ORDER — SODIUM CHLORIDE 9 MG/ML
INJECTION, SOLUTION INTRAVENOUS CONTINUOUS
Status: CANCELLED | OUTPATIENT
Start: 2019-10-31

## 2019-10-31 RX ORDER — 0.9 % SODIUM CHLORIDE 0.9 %
5 VIAL (ML) INJECTION PRN
Status: CANCELLED | OUTPATIENT
Start: 2019-10-31

## 2019-10-31 RX ORDER — ONDANSETRON 2 MG/ML
4 INJECTION INTRAMUSCULAR; INTRAVENOUS PRN
Status: CANCELLED | OUTPATIENT
Start: 2019-10-31

## 2019-10-31 RX ORDER — PROCHLORPERAZINE MALEATE 10 MG
10 TABLET ORAL EVERY 6 HOURS PRN
Status: CANCELLED | OUTPATIENT
Start: 2019-10-31

## 2019-11-01 ENCOUNTER — DOCUMENTATION (OUTPATIENT)
Dept: NUTRITION | Facility: MEDICAL CENTER | Age: 69
End: 2019-11-01

## 2019-11-01 ENCOUNTER — OUTPATIENT INFUSION SERVICES (OUTPATIENT)
Dept: ONCOLOGY | Facility: MEDICAL CENTER | Age: 69
End: 2019-11-01
Attending: INTERNAL MEDICINE
Payer: MEDICARE

## 2019-11-01 VITALS
BODY MASS INDEX: 39.22 KG/M2 | HEART RATE: 91 BPM | SYSTOLIC BLOOD PRESSURE: 134 MMHG | DIASTOLIC BLOOD PRESSURE: 81 MMHG | WEIGHT: 264.77 LBS | OXYGEN SATURATION: 92 % | HEIGHT: 69 IN | RESPIRATION RATE: 18 BRPM | TEMPERATURE: 98.2 F

## 2019-11-01 DIAGNOSIS — C64.9 RENAL CELL CARCINOMA, UNSPECIFIED LATERALITY (HCC): ICD-10-CM

## 2019-11-01 LAB
ALBUMIN SERPL BCP-MCNC: 3.5 G/DL (ref 3.2–4.9)
ALBUMIN/GLOB SERPL: 0.6 G/DL
ALP SERPL-CCNC: 78 U/L (ref 30–99)
ALT SERPL-CCNC: 10 U/L (ref 2–50)
ANION GAP SERPL CALC-SCNC: 9 MMOL/L (ref 0–11.9)
AST SERPL-CCNC: 17 U/L (ref 12–45)
BASOPHILS # BLD AUTO: 0.7 % (ref 0–1.8)
BASOPHILS # BLD: 0.04 K/UL (ref 0–0.12)
BILIRUB SERPL-MCNC: 0.8 MG/DL (ref 0.1–1.5)
BUN SERPL-MCNC: 12 MG/DL (ref 8–22)
CALCIUM SERPL-MCNC: 9.2 MG/DL (ref 8.5–10.5)
CHLORIDE SERPL-SCNC: 103 MMOL/L (ref 96–112)
CO2 SERPL-SCNC: 23 MMOL/L (ref 20–33)
CREAT SERPL-MCNC: 0.91 MG/DL (ref 0.5–1.4)
EOSINOPHIL # BLD AUTO: 0.27 K/UL (ref 0–0.51)
EOSINOPHIL NFR BLD: 4.7 % (ref 0–6.9)
ERYTHROCYTE [DISTWIDTH] IN BLOOD BY AUTOMATED COUNT: 48.5 FL (ref 35.9–50)
GLOBULIN SER CALC-MCNC: 5.5 G/DL (ref 1.9–3.5)
GLUCOSE SERPL-MCNC: 96 MG/DL (ref 65–99)
HCT VFR BLD AUTO: 35.1 % (ref 42–52)
HGB BLD-MCNC: 10.7 G/DL (ref 14–18)
IMM GRANULOCYTES # BLD AUTO: 0.02 K/UL (ref 0–0.11)
IMM GRANULOCYTES NFR BLD AUTO: 0.3 % (ref 0–0.9)
LYMPHOCYTES # BLD AUTO: 1.35 K/UL (ref 1–4.8)
LYMPHOCYTES NFR BLD: 23.4 % (ref 22–41)
MCH RBC QN AUTO: 24.2 PG (ref 27–33)
MCHC RBC AUTO-ENTMCNC: 30.5 G/DL (ref 33.7–35.3)
MCV RBC AUTO: 79.2 FL (ref 81.4–97.8)
MONOCYTES # BLD AUTO: 0.43 K/UL (ref 0–0.85)
MONOCYTES NFR BLD AUTO: 7.4 % (ref 0–13.4)
NEUTROPHILS # BLD AUTO: 3.67 K/UL (ref 1.82–7.42)
NEUTROPHILS NFR BLD: 63.5 % (ref 44–72)
NRBC # BLD AUTO: 0 K/UL
NRBC BLD-RTO: 0 /100 WBC
PLATELET # BLD AUTO: 398 K/UL (ref 164–446)
PMV BLD AUTO: 8.4 FL (ref 9–12.9)
POTASSIUM SERPL-SCNC: 3.8 MMOL/L (ref 3.6–5.5)
PROT SERPL-MCNC: 9 G/DL (ref 6–8.2)
RBC # BLD AUTO: 4.43 M/UL (ref 4.7–6.1)
SODIUM SERPL-SCNC: 135 MMOL/L (ref 135–145)
T4 FREE SERPL-MCNC: 0.86 NG/DL (ref 0.53–1.43)
TSH SERPL DL<=0.005 MIU/L-ACNC: 4.28 UIU/ML (ref 0.38–5.33)
WBC # BLD AUTO: 5.8 K/UL (ref 4.8–10.8)

## 2019-11-01 PROCEDURE — 84439 ASSAY OF FREE THYROXINE: CPT

## 2019-11-01 PROCEDURE — 82024 ASSAY OF ACTH: CPT

## 2019-11-01 PROCEDURE — 80053 COMPREHEN METABOLIC PANEL: CPT

## 2019-11-01 PROCEDURE — 700111 HCHG RX REV CODE 636 W/ 250 OVERRIDE (IP): Mod: JG | Performed by: INTERNAL MEDICINE

## 2019-11-01 PROCEDURE — 96417 CHEMO IV INFUS EACH ADDL SEQ: CPT

## 2019-11-01 PROCEDURE — 700105 HCHG RX REV CODE 258: Performed by: INTERNAL MEDICINE

## 2019-11-01 PROCEDURE — 85025 COMPLETE CBC W/AUTO DIFF WBC: CPT

## 2019-11-01 PROCEDURE — 96413 CHEMO IV INFUSION 1 HR: CPT

## 2019-11-01 PROCEDURE — 84443 ASSAY THYROID STIM HORMONE: CPT

## 2019-11-01 RX ORDER — SODIUM CHLORIDE 9 MG/ML
INJECTION, SOLUTION INTRAVENOUS CONTINUOUS
Status: DISCONTINUED | OUTPATIENT
Start: 2019-11-01 | End: 2019-11-01 | Stop reason: HOSPADM

## 2019-11-01 RX ADMIN — SODIUM CHLORIDE 340 MG: 9 INJECTION, SOLUTION INTRAVENOUS at 15:07

## 2019-11-01 RX ADMIN — SODIUM CHLORIDE 120 MG: 9 INJECTION, SOLUTION INTRAVENOUS at 16:07

## 2019-11-01 RX ADMIN — SODIUM CHLORIDE: 9 INJECTION, SOLUTION INTRAVENOUS at 15:11

## 2019-11-01 NOTE — PROGRESS NOTES
"Pharmacy Chemotherapy Verification  Patient Name: Carlo Lew  Dx: Clear cell renal carcinoma  Cycle: 1 (Previous treatment = N/A)  Regimen and Dosing Reference  Nivolumab 3 mg/kg IV over 30 minutes  Ipilimumab 1 mg/kg IV over 30 minutes on Day 1  21 day cycle for 4 cycles followed by  Nivolumab 240 mg IV over 30 minutes on Day 1  14 day cycle until disease progression or unacceptable toxicity  OR  Nivolumab 480 mg IV over 30 minutes on Day 1  28 day cycle until disease progression or unacceptable toxicity  NCCN Guidelines for Kidney.V.1.2020  Erendira BAE, et al. J Clin Oncol. 2017;35(34):7867-2020  Keshia MASTERS et al. N Engl J Med 2018;378(14):3527-0422    Allergies:Patient has no known allergies.  /81   Pulse 91   Temp 36.8 °C (98.2 °F) (Temporal)   Resp 18   Ht 1.76 m (5' 9.29\")   Wt 120.1 kg (264 lb 12.4 oz)   SpO2 92%   BMI 38.77 kg/m²   Body surface area is 2.42 meters squared.    Labs 11/1/19  ANC 3670 Hgb 10.7 Plt 398k  Free T4 = in process    Labs 10/31/19  SCr 0.84 CrCl >125 mL/min   AST/ALT/AP = 17/12/83 Tbili = 0.8  TSH = 5.51    Nivolumab 3 mg/kg x 120.1 kg = 360.3 mg   <10% difference (rounded to vial size), OK to treat with final dose = 340 mg IV    Ipilimumab 1 mg/kg x 120.1 kg = 120.1 mg   <10% difference (unable to round to vial size), OK to treat with final dose = 120 mg IV    Kajal Gurrola, PharmD, BCOP      "

## 2019-11-01 NOTE — PROGRESS NOTES
Chemotherapy Verification - PRIMARY RN      Height = 176cm  Weight = 120.1kg  BSA = 2.42m2       Medication: Nivolumab (Opdivo)  Dose: 3mg/kg  Calculated Dose: 360.3mg                              Medication: Ipilimumab (Yervoy)  Dose: 1mg/kg  Calculated Dose: 120.1mg                               I confirm this process was performed independently with the BSA and all final chemotherapy dosing calculations congruent.  Any discrepancies of 10% or greater have been addressed with the chemotherapy pharmacist. The resolution of the discrepancy has been documented in the EPIC progress notes.

## 2019-11-01 NOTE — PROGRESS NOTES
"Nutrition Services:  New Chemo  69 year old male with diagnosis of metastatic renal cell carcinoma and lung mass.    Past Medical History:   Diagnosis Date   • Cancer (HCC)     pt denies    • Colostomy in place (HCC)    • Diabetes (HCC)     \"pre diabetes\"    • HTN (hypertension) 6/14/2018   • Renal disorder      rt kidney removed dec 31, 2001     I visited with patient chairside this afternoon during infusion appointment.  Patient reports that his appetite has been stable and he has been eating normally.  He reports no changes to his bowel movements and no GI distress.  History of colostomy however patient reports that he was \"put back together\" and no longer has ostomy. He reports that his usual body weight is ~ 250 pounds +/- 20 pounds. Discussed role of nutrition during treatment; we discussed the benefit of small, frequent meals and snacks focusing on high protein items as well as the benefits of maintaining weight and lean muscle mass throughout treatment.  Discussed glucose management for prediabetes. No questions or concerns at this time. Handouts provided.     Assessment:  • Pertinent Labs 11/1: Glucose WNL 96; No recent A1c on file.   • Pertinent Meds: Metformin, vitamin D, lisinopril  • Patient reports his colostomy has been reversed.     Weight: 264 pounds/120 kg  Height:5'9''  BMI: 38 - Class II obesity    Weight History from Chart:  6/20/18: 259 pounds  8/23/19: 261 pounds    PLAN/RECOMMEND -   • Continue with current daily nutrition routine and contact RD should questions or concerns arise.     RD monitoring    "

## 2019-11-01 NOTE — PROGRESS NOTES
"Patient Name: Carlo Lew     Protocol: Nivolumab + Ipilimumab followed by Nivolumab       *Dosing Reference*  Nivolumab 3 mg/kg IV over 30 minutes on Day 1  Ipilimumab 1 mg/kg IV over 30 minutes on Day 1  21-day cycle for 4 cycles  Followed by  Nivolumab 240 mg IV over 30 minutes on Day 1 every 14 days OR Nivolumab 480 mg IV over 30 minutes on Day 1 every 28 days until DP or UT  NCCN Guidelines for Kidney Cancer V.1.2020.  Erendira HJ, et al. J Clin Oncol. 2017;35(34):7925-8084.  Keshia MASTERS, et al. N Engl J Med. 2018;378(14):6470-0766.    Dx: Metastatic clear cell renal cell carcinoma       Allergies:  Patient has no known allergies.     /81   Pulse 91   Temp 36.8 °C (98.2 °F) (Temporal)   Resp 18   Ht 1.76 m (5' 9.29\")   Wt 120.1 kg (264 lb 12.4 oz)   SpO2 92%   BMI 38.77 kg/m²  Body surface area is 2.42 meters squared.    Labs 11/1/19  ANC~ 3670 Plt = 398k   Hgb = 10.7     Free T4 = pending    Labs 10/31/19  SCr = 0.84 mg/dL CrCl ~ >125 mL/min   LFT's = WNLs  TBili = 0.8   TSH = 5.51      Drug Order   (Drug name, dose, route, IV Fluid & volume, frequency, number of doses) Cycle: 1      Previous treatment: N/a     Medication = Nivolumab  Base Dose= 3 mg/kg  Calc Dose: Base Dose x 120.1 kg = 360.3 mg  Final Dose = 340 mg  Route = IV  Fluid & Volume =  mL  Admin Duration = Over 30 minutes          Okay to round to nearest vial size within 10% per P&T MAB protocol, okay to treat with final dose   Medication = Ipilimumab  Base Dose= 1 mg/kg  Calc Dose: Base Dose x 120.1 kg = 120.1 mg  Final Dose = 120 mg  Route = IV  Fluid & Volume =  mL  Admin Duration = Over 30 minutes          <10% difference, okay to treat with final dose     By my signature below, I confirm this process was performed independently with the BSA and all final chemotherapy dosing calculations congruent. I have reviewed the above chemotherapy order and that my calculation of the final dose and BSA (when applicable) corroborate " those calculations of the  pharmacist. Discrepancies of 10% or greater in the written dose have been addressed and documented within the EPIC Progress notes.    Asif Cummings, PharmD

## 2019-11-01 NOTE — PROGRESS NOTES
Chemotherapy Verification - SECONDARY RN       Height = 176 cm  Weight = 1201.kg  BSA = 2.42 m^2       Medication: Nivolumab  Dose: 3 mg/kg  Calculated Dose: 360.3 mg                             (In mg/m2, AUC, mg/kg)     Medication: Ipilimumab  Dose: 1 mg/kg  Calculated Dose: 120.1 mg                             (In mg/m2, AUC, mg/kg)      I confirm that this process was performed independently.

## 2019-11-02 LAB — ACTH PLAS-MCNC: 29.1 PG/ML (ref 7.2–63.3)

## 2019-11-02 NOTE — PROGRESS NOTES
Pt arrived ambulatory to [facility] for chemo treatment. Pt's questions answered. POC discussed with pt and he agrees with plan. PIV established, brisk blood return noted. Lab results reviewed, ok to proceed with treatment. Pt medicated per MAR. Pt tolerated treatment without s/s adverse reaction. PIV dc'd catheter tip intact, gauze and coban dressing applied. Pt aware of next appt 11/22/19. Pt discharged to self care, NAD.

## 2019-11-21 ENCOUNTER — HOSPITAL ENCOUNTER (OUTPATIENT)
Dept: LAB | Facility: MEDICAL CENTER | Age: 69
End: 2019-11-21
Attending: NURSE PRACTITIONER
Payer: MEDICARE

## 2019-11-21 LAB
T4 FREE SERPL-MCNC: 0.84 NG/DL (ref 0.53–1.43)
TSH SERPL DL<=0.005 MIU/L-ACNC: 3.9 UIU/ML (ref 0.38–5.33)

## 2019-11-21 PROCEDURE — 84439 ASSAY OF FREE THYROXINE: CPT

## 2019-11-21 PROCEDURE — 84443 ASSAY THYROID STIM HORMONE: CPT

## 2019-11-22 ENCOUNTER — DOCUMENTATION (OUTPATIENT)
Dept: NUTRITION | Facility: MEDICAL CENTER | Age: 69
End: 2019-11-22

## 2019-11-22 ENCOUNTER — OUTPATIENT INFUSION SERVICES (OUTPATIENT)
Dept: ONCOLOGY | Facility: MEDICAL CENTER | Age: 69
End: 2019-11-22
Attending: INTERNAL MEDICINE
Payer: MEDICARE

## 2019-11-22 VITALS
RESPIRATION RATE: 18 BRPM | BODY MASS INDEX: 38.73 KG/M2 | OXYGEN SATURATION: 92 % | DIASTOLIC BLOOD PRESSURE: 83 MMHG | HEIGHT: 69 IN | TEMPERATURE: 97.9 F | SYSTOLIC BLOOD PRESSURE: 136 MMHG | HEART RATE: 95 BPM | WEIGHT: 261.47 LBS

## 2019-11-22 DIAGNOSIS — C64.9 RENAL CELL CARCINOMA, UNSPECIFIED LATERALITY (HCC): ICD-10-CM

## 2019-11-22 LAB
ALBUMIN SERPL BCP-MCNC: 3.2 G/DL (ref 3.2–4.9)
ALBUMIN/GLOB SERPL: 0.5 G/DL
ALP SERPL-CCNC: 82 U/L (ref 30–99)
ALT SERPL-CCNC: 14 U/L (ref 2–50)
ANION GAP SERPL CALC-SCNC: 10 MMOL/L (ref 0–11.9)
AST SERPL-CCNC: 22 U/L (ref 12–45)
BASOPHILS # BLD AUTO: 0.2 % (ref 0–1.8)
BASOPHILS # BLD: 0.01 K/UL (ref 0–0.12)
BILIRUB SERPL-MCNC: 0.8 MG/DL (ref 0.1–1.5)
BUN SERPL-MCNC: 16 MG/DL (ref 8–22)
CALCIUM SERPL-MCNC: 8.6 MG/DL (ref 8.5–10.5)
CHLORIDE SERPL-SCNC: 102 MMOL/L (ref 96–112)
CO2 SERPL-SCNC: 20 MMOL/L (ref 20–33)
CREAT SERPL-MCNC: 1 MG/DL (ref 0.5–1.4)
EOSINOPHIL # BLD AUTO: 0.31 K/UL (ref 0–0.51)
EOSINOPHIL NFR BLD: 5.3 % (ref 0–6.9)
ERYTHROCYTE [DISTWIDTH] IN BLOOD BY AUTOMATED COUNT: 46.9 FL (ref 35.9–50)
GLOBULIN SER CALC-MCNC: 5.9 G/DL (ref 1.9–3.5)
GLUCOSE SERPL-MCNC: 91 MG/DL (ref 65–99)
HCT VFR BLD AUTO: 33.9 % (ref 42–52)
HGB BLD-MCNC: 10.6 G/DL (ref 14–18)
IMM GRANULOCYTES # BLD AUTO: 0.02 K/UL (ref 0–0.11)
IMM GRANULOCYTES NFR BLD AUTO: 0.3 % (ref 0–0.9)
LYMPHOCYTES # BLD AUTO: 1.27 K/UL (ref 1–4.8)
LYMPHOCYTES NFR BLD: 21.6 % (ref 22–41)
MCH RBC QN AUTO: 24.4 PG (ref 27–33)
MCHC RBC AUTO-ENTMCNC: 31.3 G/DL (ref 33.7–35.3)
MCV RBC AUTO: 77.9 FL (ref 81.4–97.8)
MONOCYTES # BLD AUTO: 0.4 K/UL (ref 0–0.85)
MONOCYTES NFR BLD AUTO: 6.8 % (ref 0–13.4)
NEUTROPHILS # BLD AUTO: 3.87 K/UL (ref 1.82–7.42)
NEUTROPHILS NFR BLD: 65.8 % (ref 44–72)
NRBC # BLD AUTO: 0 K/UL
NRBC BLD-RTO: 0 /100 WBC
PLATELET # BLD AUTO: 403 K/UL (ref 164–446)
PMV BLD AUTO: 8.4 FL (ref 9–12.9)
POTASSIUM SERPL-SCNC: 3.9 MMOL/L (ref 3.6–5.5)
PROT SERPL-MCNC: 9.1 G/DL (ref 6–8.2)
RBC # BLD AUTO: 4.35 M/UL (ref 4.7–6.1)
SODIUM SERPL-SCNC: 132 MMOL/L (ref 135–145)
T4 FREE SERPL-MCNC: 0.97 NG/DL (ref 0.53–1.43)
TSH SERPL DL<=0.005 MIU/L-ACNC: 3.64 UIU/ML (ref 0.38–5.33)
WBC # BLD AUTO: 5.9 K/UL (ref 4.8–10.8)

## 2019-11-22 PROCEDURE — 96413 CHEMO IV INFUSION 1 HR: CPT

## 2019-11-22 PROCEDURE — 700105 HCHG RX REV CODE 258: Performed by: INTERNAL MEDICINE

## 2019-11-22 PROCEDURE — 84443 ASSAY THYROID STIM HORMONE: CPT

## 2019-11-22 PROCEDURE — 82024 ASSAY OF ACTH: CPT

## 2019-11-22 PROCEDURE — 84439 ASSAY OF FREE THYROXINE: CPT

## 2019-11-22 PROCEDURE — 85025 COMPLETE CBC W/AUTO DIFF WBC: CPT

## 2019-11-22 PROCEDURE — 80053 COMPREHEN METABOLIC PANEL: CPT

## 2019-11-22 PROCEDURE — 700111 HCHG RX REV CODE 636 W/ 250 OVERRIDE (IP): Mod: JG | Performed by: INTERNAL MEDICINE

## 2019-11-22 PROCEDURE — 96417 CHEMO IV INFUS EACH ADDL SEQ: CPT

## 2019-11-22 RX ORDER — 0.9 % SODIUM CHLORIDE 0.9 %
5 VIAL (ML) INJECTION PRN
Status: CANCELLED | OUTPATIENT
Start: 2019-11-22

## 2019-11-22 RX ORDER — 0.9 % SODIUM CHLORIDE 0.9 %
VIAL (ML) INJECTION PRN
Status: CANCELLED | OUTPATIENT
Start: 2019-11-22

## 2019-11-22 RX ORDER — SODIUM CHLORIDE 9 MG/ML
INJECTION, SOLUTION INTRAVENOUS CONTINUOUS
Status: DISCONTINUED | OUTPATIENT
Start: 2019-11-22 | End: 2019-11-22 | Stop reason: HOSPADM

## 2019-11-22 RX ORDER — ONDANSETRON 8 MG/1
8 TABLET, ORALLY DISINTEGRATING ORAL PRN
Status: CANCELLED | OUTPATIENT
Start: 2019-11-22

## 2019-11-22 RX ORDER — ONDANSETRON 2 MG/ML
4 INJECTION INTRAMUSCULAR; INTRAVENOUS PRN
Status: CANCELLED | OUTPATIENT
Start: 2019-11-22

## 2019-11-22 RX ORDER — PROCHLORPERAZINE MALEATE 10 MG
10 TABLET ORAL EVERY 6 HOURS PRN
Status: CANCELLED | OUTPATIENT
Start: 2019-11-22

## 2019-11-22 RX ORDER — SODIUM CHLORIDE 9 MG/ML
INJECTION, SOLUTION INTRAVENOUS CONTINUOUS
Status: CANCELLED | OUTPATIENT
Start: 2019-11-22

## 2019-11-22 RX ADMIN — SODIUM CHLORIDE: 9 INJECTION, SOLUTION INTRAVENOUS at 15:12

## 2019-11-22 RX ADMIN — SODIUM CHLORIDE 340 MG: 9 INJECTION, SOLUTION INTRAVENOUS at 15:12

## 2019-11-22 RX ADMIN — SODIUM CHLORIDE 118.5 MG: 9 INJECTION, SOLUTION INTRAVENOUS at 16:27

## 2019-11-22 NOTE — PROGRESS NOTES
Chemotherapy Verification - PRIMARY RN      Height = 175 5 cm  Weight = 118.6 kg  BSA = 2.4 m2       Medication: Nivolumab  Dose: 3 mg/kg  Calculated Dose: 355.8 mg                             (In mg/m2, AUC, mg/kg)     Medication: Ipilimumab  Dose:  Mg/kg  Calculated Dose: 118.6 mg                             (In mg/m2, AUC, mg/kg)    I confirm this process was performed independently with the BSA and all final chemotherapy dosing calculations congruent.  Any discrepancies of 10% or greater have been addressed with the chemotherapy pharmacist. The resolution of the discrepancy has been documented in the EPIC progress notes.

## 2019-11-22 NOTE — PROGRESS NOTES
Pt to [facility] for C2D1 Opdivo and Yervoy. Pt reported having loose stool in the last 24 hours, but attributed it to his diet.  He stated his stool has been firm since his last appointment except for the last 24 hours.   PIV placed, flushed easily, vijay briskly.  Pre-treatment labs drawn, CBC, CMP , TSH, Free Thyroxine and ACTH.  Results reviewed, within treatable parameters.  Pt received Opdivo over 1 hour through inline filter as ordered. Tolerated infusion well.  Filter changed, then Yervoy infused over 30 minutes. Pt tolerated infusion well. PIV flushed with NS, then d/c'd. Catheter tip remained intact, gauze and coban to site. Pt left [facility]IC in NAD, copy of appointment schedule given to patient.

## 2019-11-22 NOTE — PROGRESS NOTES
Chemotherapy Verification - PRIMARY RN      Height = 175.5 cm  Weight = 118.6 kg  BSA = 2.4 m2       Medication: Opdivo  Dose: 3 mg/kg  Calculated Dose: 355.8 mg                             (In mg/m2, AUC, mg/kg)     Medication: Yervoy  Dose: 1 mg/kg  Calculated Dose: 118.6 mg                             (In mg/m2, AUC, mg/kg)      I confirm this process was performed independently with the BSA and all final chemotherapy dosing calculations congruent.  Any discrepancies of 10% or greater have been addressed with the chemotherapy pharmacist. The resolution of the discrepancy has been documented in the EPIC progress notes.

## 2019-11-22 NOTE — PROGRESS NOTES
"Patient Name: Carlo Lew     Protocol: Nivolumab + Ipilimumab followed by Nivolumab       *Dosing Reference*  Nivolumab 3 mg/kg IV over 30 minutes on Day 1  Ipilimumab 1 mg/kg IV over 30 minutes on Day 1  21-day cycle for 4 cycles  Followed by  Nivolumab 240 mg IV over 30 minutes on Day 1 every 14 days OR Nivolumab 480 mg IV over 30 minutes on Day 1 every 28 days until DP or UT  NCCN Guidelines for Kidney Cancer V.1.2020.  Erendira HJ, et al. J Clin Oncol. 2017;35(34):1276-6547.  Keshia MASTERS, et al. N Engl J Med. 2018;378(14):1669-6346.    Dx: Metastatic clear cell renal cell carcinoma       Allergies:  Patient has no known allergies.     /83   Pulse 95   Temp 36.6 °C (97.9 °F) (Temporal)   Resp 18   Ht 1.755 m (5' 9.09\") Comment: took 1 inch off of shoes  Wt 118.6 kg (261 lb 7.5 oz)   SpO2 92%   BMI 38.51 kg/m²  Body surface area is 2.4 meters squared.    Labs 11/22/19  ANC~ 3870 Plt = 403k   Hgb = 10.6     SCr = 1.0 mg/dL CrCl ~ 113.3 mL/min   LFT's = WNL TBili = 0.8     Labs 11/21/19  TSH = 3.9 Free T4 = 0.84        Drug Order   (Drug name, dose, route, IV Fluid & volume, frequency, number of doses) Cycle: 2      Previous treatment: C1 11/1/2019     Medication = Nivolumab  Base Dose= 3 mg/kg  Calc Dose: Base Dose x 118.6 kg = 355.8 mg  Final Dose = 340 mg  Route = IV  Fluid & Volume =  mL  Admin Duration = Over 60 minutes          Okay to round to nearest vial size within 10% per P&T MAB protocol, okay to treat with final dose   Medication = Ipilimumab  Base Dose= 1 mg/kg  Calc Dose: Base Dose x 118.6 kg = 118.6 mg  Final Dose = 118.5 mg  Route = IV  Fluid & Volume =  mL  Admin Duration = Over 30 minutes          <10% difference, okay to treat with final dose     By my signature below, I confirm this process was performed independently with the BSA and all final chemotherapy dosing calculations congruent. I have reviewed the above chemotherapy order and that my calculation of the final dose " and BSA (when applicable) corroborate those calculations of the  pharmacist. Discrepancies of 10% or greater in the written dose have been addressed and documented within the EPIC Progress notes.    Piotr Guerra, PharmD

## 2019-11-23 LAB — ACTH PLAS-MCNC: 26.1 PG/ML (ref 7.2–63.3)

## 2019-11-23 NOTE — PROGRESS NOTES
Nutrition Services: Brief Update  Weight: 261 pounds/118.6 kg  Weight Change: Weight down 3 pounds/1.1% in the last three weeks which is insignificant but worth noting.     I met with patient this afternoon chairside during infusion appointment. Patient reports that he has been feeling well most recently.  He reports no nausea, vomiting or other GI distress following last RD visit and chemo infusion.  He notes that his bowel movements have been normal and that he is eating as he usually does.  He reports no questions or concerns at this time.  I encouraged him to contact RD as needed.       RD is available PRN.   [phone]     18-Feb-2018 23:35

## 2019-12-12 ENCOUNTER — OUTPATIENT INFUSION SERVICES (OUTPATIENT)
Dept: ONCOLOGY | Facility: MEDICAL CENTER | Age: 69
End: 2019-12-12
Attending: INTERNAL MEDICINE
Payer: MEDICARE

## 2019-12-12 VITALS
WEIGHT: 264.77 LBS | OXYGEN SATURATION: 93 % | SYSTOLIC BLOOD PRESSURE: 136 MMHG | BODY MASS INDEX: 39.22 KG/M2 | RESPIRATION RATE: 18 BRPM | TEMPERATURE: 97.7 F | HEIGHT: 69 IN | HEART RATE: 84 BPM | DIASTOLIC BLOOD PRESSURE: 83 MMHG

## 2019-12-12 DIAGNOSIS — C64.9 RENAL CELL CARCINOMA, UNSPECIFIED LATERALITY (HCC): ICD-10-CM

## 2019-12-12 LAB
ALBUMIN SERPL BCP-MCNC: 3.5 G/DL (ref 3.2–4.9)
ALBUMIN/GLOB SERPL: 0.7 G/DL
ALP SERPL-CCNC: 75 U/L (ref 30–99)
ALT SERPL-CCNC: 262 U/L (ref 2–50)
ANION GAP SERPL CALC-SCNC: 7 MMOL/L (ref 0–11.9)
AST SERPL-CCNC: 268 U/L (ref 12–45)
BASOPHILS # BLD AUTO: 0.8 % (ref 0–1.8)
BASOPHILS # BLD: 0.05 K/UL (ref 0–0.12)
BILIRUB SERPL-MCNC: 0.8 MG/DL (ref 0.1–1.5)
BUN SERPL-MCNC: 14 MG/DL (ref 8–22)
CALCIUM SERPL-MCNC: 9.1 MG/DL (ref 8.5–10.5)
CHLORIDE SERPL-SCNC: 106 MMOL/L (ref 96–112)
CO2 SERPL-SCNC: 23 MMOL/L (ref 20–33)
CREAT SERPL-MCNC: 0.99 MG/DL (ref 0.5–1.4)
EOSINOPHIL # BLD AUTO: 0.84 K/UL (ref 0–0.51)
EOSINOPHIL NFR BLD: 14.3 % (ref 0–6.9)
ERYTHROCYTE [DISTWIDTH] IN BLOOD BY AUTOMATED COUNT: 49.9 FL (ref 35.9–50)
GLOBULIN SER CALC-MCNC: 5.2 G/DL (ref 1.9–3.5)
GLUCOSE SERPL-MCNC: 110 MG/DL (ref 65–99)
HCT VFR BLD AUTO: 38 % (ref 42–52)
HGB BLD-MCNC: 11.7 G/DL (ref 14–18)
IMM GRANULOCYTES # BLD AUTO: 0.04 K/UL (ref 0–0.11)
IMM GRANULOCYTES NFR BLD AUTO: 0.7 % (ref 0–0.9)
LYMPHOCYTES # BLD AUTO: 1.39 K/UL (ref 1–4.8)
LYMPHOCYTES NFR BLD: 23.6 % (ref 22–41)
MCH RBC QN AUTO: 24.1 PG (ref 27–33)
MCHC RBC AUTO-ENTMCNC: 30.8 G/DL (ref 33.7–35.3)
MCV RBC AUTO: 78.2 FL (ref 81.4–97.8)
MONOCYTES # BLD AUTO: 0.43 K/UL (ref 0–0.85)
MONOCYTES NFR BLD AUTO: 7.3 % (ref 0–13.4)
NEUTROPHILS # BLD AUTO: 3.14 K/UL (ref 1.82–7.42)
NEUTROPHILS NFR BLD: 53.3 % (ref 44–72)
NRBC # BLD AUTO: 0 K/UL
NRBC BLD-RTO: 0 /100 WBC
PLATELET # BLD AUTO: 325 K/UL (ref 164–446)
PMV BLD AUTO: 8.9 FL (ref 9–12.9)
POTASSIUM SERPL-SCNC: 3.7 MMOL/L (ref 3.6–5.5)
PROT SERPL-MCNC: 8.7 G/DL (ref 6–8.2)
RBC # BLD AUTO: 4.86 M/UL (ref 4.7–6.1)
SODIUM SERPL-SCNC: 136 MMOL/L (ref 135–145)
T4 FREE SERPL-MCNC: 0.82 NG/DL (ref 0.53–1.43)
TSH SERPL DL<=0.005 MIU/L-ACNC: 3.76 UIU/ML (ref 0.38–5.33)
WBC # BLD AUTO: 5.9 K/UL (ref 4.8–10.8)

## 2019-12-12 PROCEDURE — 82024 ASSAY OF ACTH: CPT

## 2019-12-12 PROCEDURE — 84439 ASSAY OF FREE THYROXINE: CPT

## 2019-12-12 PROCEDURE — 84443 ASSAY THYROID STIM HORMONE: CPT

## 2019-12-12 PROCEDURE — 80053 COMPREHEN METABOLIC PANEL: CPT

## 2019-12-12 PROCEDURE — 36415 COLL VENOUS BLD VENIPUNCTURE: CPT

## 2019-12-12 PROCEDURE — 85025 COMPLETE CBC W/AUTO DIFF WBC: CPT

## 2019-12-12 RX ORDER — PROCHLORPERAZINE MALEATE 10 MG
10 TABLET ORAL EVERY 6 HOURS PRN
Status: CANCELLED | OUTPATIENT
Start: 2019-12-12

## 2019-12-12 RX ORDER — SODIUM CHLORIDE 9 MG/ML
INJECTION, SOLUTION INTRAVENOUS CONTINUOUS
Status: CANCELLED | OUTPATIENT
Start: 2019-12-12

## 2019-12-12 RX ORDER — ONDANSETRON 2 MG/ML
4 INJECTION INTRAMUSCULAR; INTRAVENOUS PRN
Status: CANCELLED | OUTPATIENT
Start: 2019-12-12

## 2019-12-12 RX ORDER — 0.9 % SODIUM CHLORIDE 0.9 %
VIAL (ML) INJECTION PRN
Status: CANCELLED | OUTPATIENT
Start: 2019-12-12

## 2019-12-12 RX ORDER — ONDANSETRON 8 MG/1
8 TABLET, ORALLY DISINTEGRATING ORAL PRN
Status: CANCELLED | OUTPATIENT
Start: 2019-12-12

## 2019-12-12 RX ORDER — 0.9 % SODIUM CHLORIDE 0.9 %
5 VIAL (ML) INJECTION PRN
Status: CANCELLED | OUTPATIENT
Start: 2019-12-12

## 2019-12-12 NOTE — PROGRESS NOTES
Chemotherapy Verification - PRIMARY RN      Height = 1.753 m  Weight = 120.1 kg  BSA = 2.42 m^2       Medication: nivolumab  Dose: 3 mg/kg  Calculated Dose: 360.3 mg                             (In mg/m2, AUC, mg/kg)     Medication: ipilimumab  Dose: 1 mg/kg  Calculated Dose: 120.1 mg                             (In mg/m2, AUC, mg/kg)        I confirm this process was performed independently with the BSA and all final chemotherapy dosing calculations congruent.  Any discrepancies of 10% or greater have been addressed with the chemotherapy pharmacist. The resolution of the discrepancy has been documented in the EPIC progress notes.

## 2019-12-12 NOTE — PROGRESS NOTES
Chemotherapy Verification - SECONDARY RN       Height = 175.3 cm  Weight = 120.1 kg  BSA = 2.42 m2       Medication: Opdivo  Dose: 3 mg/kg  Calculated Dose: 360.3 mg                             (In mg/m2, AUC, mg/kg)     Medication: Yervoy  Dose: 1 mg/kg  Calculated Dose: 120.1 mg                             (In mg/m2, AUC, mg/kg)      I confirm that this process was performed independently.

## 2019-12-12 NOTE — PROGRESS NOTES
"Pharmacy Chemotherapy Verification  Patient Name: Carlo Lew  Dx: Clear cell renal carcinoma      Previous treatment = Cycle 2 11/22/19    Regimen and Dosing Reference  Nivolumab 3 mg/kg IV over 30 minutes  Ipilimumab 1 mg/kg IV over 30 minutes on Day 1  21 day cycle for 4 cycles followed by  Nivolumab 240 mg IV over 30 minutes on Day 1  14 day cycle until disease progression or unacceptable toxicity  OR  Nivolumab 480 mg IV over 30 minutes on Day 1  28 day cycle until disease progression or unacceptable toxicity  NCCN Guidelines for Kidney.V.1.2020  Erendira HJ, et al. J Clin Oncol. 2017;35(34):3496-3316  Keshia MASTERS et al. N Engl J Med 2018;378(14):5996-6707    Allergies:Patient has no known allergies.  /83   Pulse 84   Temp 36.5 °C (97.7 °F) (Temporal)   Resp 18   Ht 1.753 m (5' 9\")   Wt 120.1 kg (264 lb 12.4 oz)   SpO2 93%   BMI 39.10 kg/m²   Body surface area is 2.42 meters squared.    All labs (12/12/19), including thyroid panel, are within treatment plan parameters, except AST = 268 and ALT = 262. MD deferring treatment        Rounded to vial size (within 10%) per RX protocol = 0 mg IV       <10% difference, okay to treat with final dose = 0 mg IV    Piotr Guerra, PharmD        "

## 2019-12-12 NOTE — PROGRESS NOTES
[facility]lo into [facility] for Day 1/ Cycle 3 of opdivo and yervoy for renal carcinoma. Pt denied having any new or acute complaints today, reports tolerating past treatments well. PIV started, had + blood return flushed briskly and labs drawn. Patient did not meet parameters for treatment and treatment deferred for 3 weeks per Dr. Oliva. PIV discontinued, bleeding controlled with gauze and coban. Additional appointments scheduled and patient discharged home to self care.

## 2019-12-13 ENCOUNTER — HOSPITAL ENCOUNTER (OUTPATIENT)
Facility: MEDICAL CENTER | Age: 69
End: 2019-12-13
Attending: NURSE PRACTITIONER
Payer: MEDICARE

## 2019-12-13 LAB
ACTH PLAS-MCNC: 34.6 PG/ML (ref 7.2–63.3)
ALBUMIN SERPL BCP-MCNC: 3.6 G/DL (ref 3.2–4.9)
ALP SERPL-CCNC: 84 U/L (ref 30–99)
ALT SERPL-CCNC: 285 U/L (ref 2–50)
AST SERPL-CCNC: 284 U/L (ref 12–45)
BILIRUB CONJ SERPL-MCNC: 0.2 MG/DL (ref 0.1–0.5)
BILIRUB INDIRECT SERPL-MCNC: 0.5 MG/DL (ref 0–1)
BILIRUB SERPL-MCNC: 0.7 MG/DL (ref 0.1–1.5)
HAV IGM SERPL QL IA: NEGATIVE
HBV CORE IGM SER QL: NEGATIVE
HBV SURFACE AG SER QL: NEGATIVE
HCV AB SER QL: NEGATIVE
PROT SERPL-MCNC: 8.7 G/DL (ref 6–8.2)

## 2019-12-13 PROCEDURE — 80076 HEPATIC FUNCTION PANEL: CPT

## 2019-12-13 PROCEDURE — 80074 ACUTE HEPATITIS PANEL: CPT | Mod: GZ

## 2020-01-23 NOTE — PROGRESS NOTES
Dr Oliva reports that pt's treatment is on hold until further notice. RN attempted to contact pt with all numbers on file, not able to reach pt. Left message to call infusion center if he had further questions about plan of care.

## 2020-03-03 ENCOUNTER — HOSPITAL ENCOUNTER (OUTPATIENT)
Dept: RADIOLOGY | Facility: MEDICAL CENTER | Age: 70
End: 2020-03-03
Attending: INTERNAL MEDICINE
Payer: MEDICARE

## 2020-03-03 DIAGNOSIS — C64.1 MALIGNANT NEOPLASM OF RIGHT KIDNEY, EXCEPT RENAL PELVIS (HCC): ICD-10-CM

## 2020-03-03 PROCEDURE — 71260 CT THORAX DX C+: CPT

## 2020-03-03 PROCEDURE — 700117 HCHG RX CONTRAST REV CODE 255: Performed by: INTERNAL MEDICINE

## 2020-03-03 RX ADMIN — IOHEXOL 25 ML: 240 INJECTION, SOLUTION INTRATHECAL; INTRAVASCULAR; INTRAVENOUS; ORAL at 15:45

## 2020-03-03 RX ADMIN — IOHEXOL 75 ML: 350 INJECTION, SOLUTION INTRAVENOUS at 15:45

## 2020-07-16 ENCOUNTER — HOSPITAL ENCOUNTER (OUTPATIENT)
Dept: RADIOLOGY | Facility: MEDICAL CENTER | Age: 70
End: 2020-07-16
Attending: INTERNAL MEDICINE
Payer: MEDICARE

## 2020-07-16 DIAGNOSIS — C64.1 KIDNEY MALIGNANT NEOPLASM, RIGHT (HCC): ICD-10-CM

## 2020-07-16 PROCEDURE — 700117 HCHG RX CONTRAST REV CODE 255: Performed by: INTERNAL MEDICINE

## 2020-07-16 PROCEDURE — 71260 CT THORAX DX C+: CPT

## 2020-07-16 RX ADMIN — IOHEXOL 100 ML: 350 INJECTION, SOLUTION INTRAVENOUS at 11:00

## 2020-07-16 RX ADMIN — IOHEXOL 25 ML: 240 INJECTION, SOLUTION INTRATHECAL; INTRAVASCULAR; INTRAVENOUS; ORAL at 11:00

## 2020-10-22 ENCOUNTER — HOSPITAL ENCOUNTER (OUTPATIENT)
Dept: RADIOLOGY | Facility: MEDICAL CENTER | Age: 70
End: 2020-10-22
Attending: INTERNAL MEDICINE
Payer: MEDICARE

## 2020-10-22 DIAGNOSIS — C64.1 MALIGNANT NEOPLASM OF RIGHT KIDNEY, EXCEPT RENAL PELVIS (HCC): ICD-10-CM

## 2020-10-22 PROCEDURE — 700117 HCHG RX CONTRAST REV CODE 255: Performed by: OBSTETRICS & GYNECOLOGY

## 2020-10-22 PROCEDURE — 71260 CT THORAX DX C+: CPT

## 2020-10-22 RX ADMIN — IOHEXOL 100 ML: 350 INJECTION, SOLUTION INTRAVENOUS at 12:16

## 2023-07-20 NOTE — ASSESSMENT & PLAN NOTE
1/22/19 S/P robotic assisted laparoscopic colostomy reversal, splenic flexure mobilization   appt scheduled October 17th

## (undated) DEVICE — RELOAD STAPLER GREEN ENDOWRIST 45 (12EA/BX)

## (undated) DEVICE — SHEATH STAPLER 45 DAVINCI (10EA/BX)

## (undated) DEVICE — TROCAR 12 X 150 KII FIOS Z THREAD (6EA/BX)

## (undated) DEVICE — SUTURE GENERAL

## (undated) DEVICE — BLOCK

## (undated) DEVICE — HEAD HOLDER JUNIOR/ADULT

## (undated) DEVICE — SUCTION INSTRUMENT YANKAUER BULBOUS TIP W/O VENT (50EA/CA)

## (undated) DEVICE — DRAPE LARGE 3 QUARTER - (20/CA)

## (undated) DEVICE — SEAL CANNULA STAPLER 12 MM (10EA/BX)

## (undated) DEVICE — GLOVE BIOGEL SZ 8 SURGICAL PF LTX - (50PR/BX 4BX/CA)

## (undated) DEVICE — SET SUCTION/IRRIGATION WITH DISPOSABLE TIP (6/CA )PART #0250-070-520 IS A SUB

## (undated) DEVICE — GLOVE BIOGEL SZ 6.5 SURGICAL PF LTX (50PR/BX 4BX/CA)

## (undated) DEVICE — BLADE SURGICAL CLIPPER - (50EA/CA)

## (undated) DEVICE — DRAPE COLUMN  BOX OF 20

## (undated) DEVICE — GOWN SURGEONS X-LARGE - DISP. (30/CA)

## (undated) DEVICE — PAD LAP STERILE 18 X 18 - (5/PK 40PK/CA)

## (undated) DEVICE — SET LEADWIRE 5 LEAD BEDSIDE DISPOSABLE ECG (1SET OF 5/EA)

## (undated) DEVICE — PAD OR TABLE DA VINCI 2IN X 20IN X 72IN - (12EA/CA)

## (undated) DEVICE — ELECTRODE DUAL RETURN W/ CORD - (50/PK)

## (undated) DEVICE — KIT CUSTOM PROCEDURE SINGLE FOR ENDO  (15/CA)

## (undated) DEVICE — COVER TIP ENDOWRIST HOT SHEAR - (10EA/BX) DA VINCI

## (undated) DEVICE — GLOVE BIOGEL PI INDICATOR SZ 6.5 SURGICAL PF LF - (50/BX 4BX/CA)

## (undated) DEVICE — SUTURE 1 PDS PLUS CT-1 36IN (36EA/BX)

## (undated) DEVICE — SPONGE GAUZE NON-STERILE 4X4 - (2000/CA 10PK/CA)

## (undated) DEVICE — APPLIER 5MM MED/LARGE CLIP - (3/BX)

## (undated) DEVICE — CANNULA W/ SUPPLY TUBING O2 - (50/CA)

## (undated) DEVICE — SUTURE 3-0 VICRYL PLUS SH - 8X 18 INCH (12/BX)

## (undated) DEVICE — KIT ROOM DECONTAMINATION

## (undated) DEVICE — SYRINGE 6 CC 20 GA X 1 1/2 - NDL SAFETY  (50/BX)

## (undated) DEVICE — SUTURE 0 LIGATING REEL VICRYL PLUS (12PK/BX)

## (undated) DEVICE — SODIUM CHL IRRIGATION 0.9% 1000ML (12EA/CA)

## (undated) DEVICE — BASIN EMESIS DISP. - (250/CA)

## (undated) DEVICE — CON SEDATION EA ADDL 15 MIN

## (undated) DEVICE — TUBE NG SALEM SUMP 16FR (50EA/CA)

## (undated) DEVICE — REDUCER XI STAPLER 12MM TO 8MM (6EA/BX)

## (undated) DEVICE — WATER IRRIG. STER. 1500 ML - (9/CA)

## (undated) DEVICE — DRAPE IOBAN II INCISE 23X17 - (10EA/BX 4BX/CA)

## (undated) DEVICE — GLOVE BIOGEL SZ 7.5 SURGICAL PF LTX - (50PR/BX 4BX/CA)

## (undated) DEVICE — CANNULA W/SEAL 5X100 Z-THRE - ADED KII (12/BX)

## (undated) DEVICE — SYRINGE 3 CC 22 GA X 1-1/2 - NDL SAFETY (50/BX 8BX/CA)

## (undated) DEVICE — GLOVE BIOGEL ECLIPSE  PF LATEX SIZE 6.5 (50PR/BX)

## (undated) DEVICE — CHLORAPREP 26 ML APPLICATOR - ORANGE TINT(25/CA)

## (undated) DEVICE — GOWN WARMING STANDARD FLEX - (30/CA)

## (undated) DEVICE — MASK ANESTHESIA ADULT  - (100/CA)

## (undated) DEVICE — TROCAR Z THREAD12MM OPTICAL - NON BLADED (6/BX)

## (undated) DEVICE — ROBOTIC SURGERY SERVICES

## (undated) DEVICE — OBTURATOR BLADELESS STANDARD 8MM (6EA/BX)

## (undated) DEVICE — CONTAINER, SPECIMEN, STERILE

## (undated) DEVICE — SENSOR SPO2 NEO LNCS ADHESIVE (20/BX) SEE USER NOTES

## (undated) DEVICE — SUTURE NABSB 2-0 KS 30IN PRLN BLUE (36PK/BX)

## (undated) DEVICE — SHEARS MONOPOLAR CURVED  DA VINCI 10X'S REUSABLE

## (undated) DEVICE — SUTURE 4-0 MONOCRYL PLUS PS-2 - 27 INCH (36/BX)

## (undated) DEVICE — SPONGE LAP XR ST 36X36 LF - (1/PK40PK/CA)

## (undated) DEVICE — SET EXTENSION WITH 2 PORTS (48EA/CA) ***PART #2C8610 IS A SUBSTITUTE*****

## (undated) DEVICE — KIT 2.75IN COL ILSTM 2 PC DRN - 70MM 2 3/4 INCH THIS IS FOR THE OR ONLY (5/BX)

## (undated) DEVICE — CANISTER SUCTION 3000ML MECHANICAL FILTER AUTO SHUTOFF MEDI-VAC NONSTERILE LF DISP  (40EA/CA)

## (undated) DEVICE — TUBE CONNECTING SUCTION - CLEAR PLASTIC STERILE 72 IN (50EA/CA)

## (undated) DEVICE — ELECTRODE 850 FOAM ADHESIVE - HYDROGEL RADIOTRNSPRNT (50/PK)

## (undated) DEVICE — FORCEPS FENESTRATED BIPOLAR DA VINCI 10X'S REUSABLE

## (undated) DEVICE — BLANKET WARMING UPPER BODY - (10/CA)

## (undated) DEVICE — RELOAD STAPLER WHITE ENDOWRIST 45 (12EA/BX)

## (undated) DEVICE — TOWELS CLOTH SURGICAL - (4/PK 20PK/CA)

## (undated) DEVICE — GOWN SURGEONS LARGE - (32/CA)

## (undated) DEVICE — CANNULA SEAL 8.5-13MM (10/BX)

## (undated) DEVICE — SLEEVE, VASO, THIGH, MED

## (undated) DEVICE — TUBING CLEARLINK DUO-VENT - C-FLO (48EA/CA)

## (undated) DEVICE — KIT ANESTHESIA W/CIRCUIT & 3/LT BAG W/FILTER (20EA/CA)

## (undated) DEVICE — DRESSING TRANSPARENT FILM TEGADERM 4 X 4.75" (50EA/BX)"

## (undated) DEVICE — GLOVE BIOGEL PI INDICATOR SZ 7.0 SURGICAL PF LF - (50/BX 4BX/CA)

## (undated) DEVICE — NEPTUNE 4 PORT MANIFOLD - (20/PK)

## (undated) DEVICE — DRAPE ARM  BOX OF 20

## (undated) DEVICE — TROCAR 5X100 NON BLADED Z-TH - READ KII (6/BX)

## (undated) DEVICE — SCISSOR MONOPLR CRV(HOT SHEAR - DA VINCI 10X'S REUSABLE

## (undated) DEVICE — STRIP PACKING STERILE 1/4 IN - 1/4 IN X 5 YDS (IODOFORM)

## (undated) DEVICE — PROTECTOR ULNA NERVE - (36PR/CA)

## (undated) DEVICE — SUTURE 0 VICRYL PLUS CT-2 - 8 X 18 INCH (12/BX)

## (undated) DEVICE — TROCAR 5X100 BLADED Z-THREAD - KII (6/BX)

## (undated) DEVICE — TRAY CATHETER FOLEY URINE METER W/STATLOCK 350ML (10EA/CA)

## (undated) DEVICE — LACTATED RINGERS INJ 1000 ML - (14EA/CA 60CA/PF)

## (undated) DEVICE — DERMABOND ADVANCED - (12EA/BX)

## (undated) DEVICE — SPONGE GAUZESTER 4 X 4 4PLY - (128PK/CA)

## (undated) DEVICE — CANISTER SUCTION RIGID RED 1500CC (40EA/CA)

## (undated) DEVICE — STAPLER POWERED 60MM (3EA/BX)

## (undated) DEVICE — SEAL 5MM-8MM UNIVERSAL  BOX OF 10

## (undated) DEVICE — Device

## (undated) DEVICE — FORCEP BIPOLAR FENESTRATED - DA VINCI 10X'S REUSABLE

## (undated) DEVICE — DRAPE 4 ARM DA VANCI SI - (5EA/CA)

## (undated) DEVICE — SOD. CHL 10CC SYRINGE PREFILL - W/10 CC (30/BX)

## (undated) DEVICE — STAPLER 29MM CIRCULAR CURVED - (3EA/BX)

## (undated) DEVICE — TUBE E-T HI-LO CUFF 8.0MM (10EA/PK)

## (undated) DEVICE — SEALER VESSEL DA VINCI XI (6EA/CA)

## (undated) DEVICE — OBTURATOR 8MM BLADELESS - (24EA/BX) DA VINCI

## (undated) DEVICE — RELOAD STAPLER 45 GREEN DAVINCI (12EA/BX)

## (undated) DEVICE — ENDOWRIST PRO GRASP - DA VINCI 10X'S REUSABLE

## (undated) DEVICE — CON SEDATION/>5 YR 1ST 15 MIN

## (undated) DEVICE — GAUZE FLUFF STERILE 2-PLY 36 X 36 (100EA/CA)

## (undated) DEVICE — DRESSING TRANSPARENT FILM TEGADERM 2.375 X 2.75"  (100EA/BX)"

## (undated) DEVICE — DRAPE MAYO STAND - (30/CA)